# Patient Record
Sex: FEMALE | Race: WHITE | Employment: OTHER | URBAN - NONMETROPOLITAN AREA
[De-identification: names, ages, dates, MRNs, and addresses within clinical notes are randomized per-mention and may not be internally consistent; named-entity substitution may affect disease eponyms.]

---

## 2017-05-08 PROBLEM — H91.90 HEARING LOSS: Status: ACTIVE | Noted: 2017-05-08

## 2017-06-23 ENCOUNTER — HOSPITAL ENCOUNTER (OUTPATIENT)
Age: 62
Discharge: HOME | End: 2017-06-23
Payer: COMMERCIAL

## 2017-06-23 DIAGNOSIS — E78.49 OTHER HYPERLIPIDEMIA: ICD-10-CM

## 2017-06-23 DIAGNOSIS — R73.9 HYPERGLYCEMIA: ICD-10-CM

## 2017-06-23 LAB
ALT SERPL-CCNC: 27 U/L (ref 5–33)
ANION GAP SERPL CALCULATED.3IONS-SCNC: 14 MMOL/L (ref 9–17)
AST SERPL-CCNC: 20 U/L
BUN BLDV-MCNC: 16 MG/DL (ref 8–23)
BUN/CREAT BLD: 24 (ref 9–20)
CALCIUM SERPL-MCNC: 9.2 MG/DL (ref 8.6–10.4)
CHLORIDE BLD-SCNC: 100 MMOL/L (ref 98–107)
CO2: 28 MMOL/L (ref 20–31)
CREAT SERPL-MCNC: 0.67 MG/DL (ref 0.5–0.9)
ESTIMATED AVERAGE GLUCOSE: 111 MG/DL
GFR AFRICAN AMERICAN: >60 ML/MIN
GFR NON-AFRICAN AMERICAN: >60 ML/MIN
GFR SERPL CREATININE-BSD FRML MDRD: ABNORMAL ML/MIN/{1.73_M2}
GFR SERPL CREATININE-BSD FRML MDRD: ABNORMAL ML/MIN/{1.73_M2}
GLUCOSE BLD-MCNC: 102 MG/DL (ref 70–99)
HBA1C MFR BLD: 5.5 % (ref 4.8–5.9)
HCT VFR BLD CALC: 39.2 % (ref 36–46)
HEMOGLOBIN: 13.4 G/DL (ref 12–16)
MCH RBC QN AUTO: 28.7 PG (ref 26–34)
MCHC RBC AUTO-ENTMCNC: 34.2 G/DL (ref 31–37)
MCV RBC AUTO: 84.1 FL (ref 80–100)
PDW BLD-RTO: 13.7 % (ref 12.1–15.2)
PLATELET # BLD: 247 K/UL (ref 140–450)
PMV BLD AUTO: NORMAL FL (ref 6–12)
POTASSIUM SERPL-SCNC: 4.3 MMOL/L (ref 3.7–5.3)
RBC # BLD: 4.67 M/UL (ref 4–5.2)
SODIUM BLD-SCNC: 142 MMOL/L (ref 135–144)
WBC # BLD: 6.8 K/UL (ref 3.5–11)

## 2017-06-23 PROCEDURE — 85027 COMPLETE CBC AUTOMATED: CPT

## 2017-06-23 PROCEDURE — 80048 BASIC METABOLIC PNL TOTAL CA: CPT

## 2017-06-23 PROCEDURE — 83036 HEMOGLOBIN GLYCOSYLATED A1C: CPT

## 2017-06-23 PROCEDURE — 84460 ALANINE AMINO (ALT) (SGPT): CPT

## 2017-06-23 PROCEDURE — 36415 COLL VENOUS BLD VENIPUNCTURE: CPT

## 2017-06-23 PROCEDURE — 84450 TRANSFERASE (AST) (SGOT): CPT

## 2017-08-29 ENCOUNTER — OFFICE VISIT (OUTPATIENT)
Dept: CARDIOLOGY | Age: 62
End: 2017-08-29
Payer: COMMERCIAL

## 2017-08-29 VITALS
HEIGHT: 64 IN | SYSTOLIC BLOOD PRESSURE: 120 MMHG | HEART RATE: 75 BPM | WEIGHT: 191.8 LBS | BODY MASS INDEX: 32.74 KG/M2 | DIASTOLIC BLOOD PRESSURE: 75 MMHG

## 2017-08-29 DIAGNOSIS — E78.5 DYSLIPIDEMIA: ICD-10-CM

## 2017-08-29 DIAGNOSIS — I10 ESSENTIAL HYPERTENSION: ICD-10-CM

## 2017-08-29 DIAGNOSIS — I34.0 NON-RHEUMATIC MITRAL REGURGITATION: Primary | ICD-10-CM

## 2017-08-29 DIAGNOSIS — I35.0 NONRHEUMATIC AORTIC VALVE STENOSIS: ICD-10-CM

## 2017-08-29 PROCEDURE — 93000 ELECTROCARDIOGRAM COMPLETE: CPT | Performed by: INTERNAL MEDICINE

## 2017-08-29 PROCEDURE — 99213 OFFICE O/P EST LOW 20 MIN: CPT | Performed by: INTERNAL MEDICINE

## 2017-12-11 ENCOUNTER — HOSPITAL ENCOUNTER (OUTPATIENT)
Age: 62
Discharge: HOME | End: 2017-12-11
Payer: COMMERCIAL

## 2017-12-11 DIAGNOSIS — R73.9 HYPERGLYCEMIA: ICD-10-CM

## 2017-12-11 DIAGNOSIS — E78.5 HYPERLIPIDEMIA, UNSPECIFIED: ICD-10-CM

## 2017-12-11 LAB
ALT SERPL-CCNC: 23 U/L (ref 5–33)
ANION GAP SERPL CALCULATED.3IONS-SCNC: 10 MMOL/L (ref 9–17)
AST SERPL-CCNC: 19 U/L
BUN BLDV-MCNC: 14 MG/DL (ref 8–23)
BUN/CREAT BLD: 23 (ref 9–20)
CALCIUM SERPL-MCNC: 8.9 MG/DL (ref 8.6–10.4)
CHLORIDE BLD-SCNC: 105 MMOL/L (ref 98–107)
CO2: 30 MMOL/L (ref 20–31)
CREAT SERPL-MCNC: 0.61 MG/DL (ref 0.5–0.9)
ESTIMATED AVERAGE GLUCOSE: 114 MG/DL
GFR AFRICAN AMERICAN: >60 ML/MIN
GFR NON-AFRICAN AMERICAN: >60 ML/MIN
GFR SERPL CREATININE-BSD FRML MDRD: ABNORMAL ML/MIN/{1.73_M2}
GFR SERPL CREATININE-BSD FRML MDRD: ABNORMAL ML/MIN/{1.73_M2}
GLUCOSE BLD-MCNC: 95 MG/DL (ref 70–99)
HBA1C MFR BLD: 5.6 % (ref 4.8–5.9)
HCT VFR BLD CALC: 39.1 % (ref 36–46)
HEMOGLOBIN: 12.7 G/DL (ref 12–16)
HIGH SENSITIVE C-REACTIVE PROTEIN: 4.5 MG/L
MCH RBC QN AUTO: 28.2 PG (ref 26–34)
MCHC RBC AUTO-ENTMCNC: 32.6 G/DL (ref 31–37)
MCV RBC AUTO: 86.5 FL (ref 80–100)
PDW BLD-RTO: 13.8 % (ref 12.1–15.2)
PLATELET # BLD: 238 K/UL (ref 140–450)
PMV BLD AUTO: 7.6 FL (ref 6–12)
POTASSIUM SERPL-SCNC: 4 MMOL/L (ref 3.7–5.3)
RBC # BLD: 4.52 M/UL (ref 4–5.2)
SODIUM BLD-SCNC: 145 MMOL/L (ref 135–144)
WBC # BLD: 6.1 K/UL (ref 3.5–11)

## 2017-12-11 PROCEDURE — 80048 BASIC METABOLIC PNL TOTAL CA: CPT

## 2017-12-11 PROCEDURE — 36415 COLL VENOUS BLD VENIPUNCTURE: CPT

## 2017-12-11 PROCEDURE — 84460 ALANINE AMINO (ALT) (SGPT): CPT

## 2017-12-11 PROCEDURE — 84450 TRANSFERASE (AST) (SGOT): CPT

## 2017-12-11 PROCEDURE — 85027 COMPLETE CBC AUTOMATED: CPT

## 2017-12-11 PROCEDURE — 86141 C-REACTIVE PROTEIN HS: CPT

## 2017-12-11 PROCEDURE — 83036 HEMOGLOBIN GLYCOSYLATED A1C: CPT

## 2017-12-18 ENCOUNTER — OFFICE VISIT (OUTPATIENT)
Dept: FAMILY MEDICINE CLINIC | Age: 62
End: 2017-12-18
Payer: COMMERCIAL

## 2017-12-18 VITALS
DIASTOLIC BLOOD PRESSURE: 72 MMHG | BODY MASS INDEX: 32.44 KG/M2 | HEIGHT: 64 IN | SYSTOLIC BLOOD PRESSURE: 128 MMHG | WEIGHT: 190 LBS

## 2017-12-18 DIAGNOSIS — I10 ESSENTIAL HYPERTENSION: ICD-10-CM

## 2017-12-18 DIAGNOSIS — M85.80 OSTEOPENIA, UNSPECIFIED LOCATION: ICD-10-CM

## 2017-12-18 DIAGNOSIS — H69.80 DYSFUNCTION OF EUSTACHIAN TUBE, UNSPECIFIED LATERALITY: ICD-10-CM

## 2017-12-18 DIAGNOSIS — Z12.31 ENCOUNTER FOR SCREENING MAMMOGRAM FOR BREAST CANCER: ICD-10-CM

## 2017-12-18 DIAGNOSIS — R73.9 HYPERGLYCEMIA: Primary | ICD-10-CM

## 2017-12-18 PROCEDURE — 99214 OFFICE O/P EST MOD 30 MIN: CPT | Performed by: FAMILY MEDICINE

## 2017-12-18 PROCEDURE — G8484 FLU IMMUNIZE NO ADMIN: HCPCS | Performed by: FAMILY MEDICINE

## 2017-12-18 PROCEDURE — G8417 CALC BMI ABV UP PARAM F/U: HCPCS | Performed by: FAMILY MEDICINE

## 2017-12-18 PROCEDURE — 3017F COLORECTAL CA SCREEN DOC REV: CPT | Performed by: FAMILY MEDICINE

## 2017-12-18 PROCEDURE — 92567 TYMPANOMETRY: CPT | Performed by: FAMILY MEDICINE

## 2017-12-18 PROCEDURE — 1036F TOBACCO NON-USER: CPT | Performed by: FAMILY MEDICINE

## 2017-12-18 PROCEDURE — 3014F SCREEN MAMMO DOC REV: CPT | Performed by: FAMILY MEDICINE

## 2017-12-18 PROCEDURE — G8427 DOCREV CUR MEDS BY ELIG CLIN: HCPCS | Performed by: FAMILY MEDICINE

## 2017-12-18 RX ORDER — PREDNISONE 10 MG/1
TABLET ORAL
Qty: 30 TABLET | Refills: 0 | Status: SHIPPED | OUTPATIENT
Start: 2017-12-18 | End: 2018-06-22 | Stop reason: ALTCHOICE

## 2017-12-18 NOTE — PROGRESS NOTES
[address]  [place] [id number]  Dept: [phone]      Elena Pyle is a 60 y.o. female here for 6 Month Follow-Up; Hyperglycemia; and Hypertension      HPI:  HYPERTENSION  She is not exercising and is adherent to a low-salt diet. Blood pressure is not being monitored at home. Cardiac symptoms: chest pressure and palpitations. Patient denies: [place] Medal:  Diet compliance: compliant most of the time  Current exercise: no regular exercise. Prior to Admission medications    Medication Sig Start Date End Date Taking? Authorizing Provider   predniSONE (DELTASONE) 10 MG tablet 2 po BID x 5 days then 1 po BID x 5 days 12/18/17  Yes Shaneka Ceballos MD   furosemide (LASIX) 40 MG tablet TAKE ONE TABLET BY MOUTH DAILY 10/9/17  Yes Shaneka Ceballos MD   atorvastatin (LIPITOR) 20 MG tablet TAKE ONE TABLET BY MOUTH DAILY 9/19/17  Yes Shaneka Ceballos MD   calcium carbonate (OSCAL) 500 MG TABS tablet Take 500 mg by mouth daily   Yes Historical Provider, MD   aspirin 81 MG tablet Take 81 mg by mouth daily   Yes Historical Provider, MD   Multiple Vitamins-Minerals (ALIVE ONCE DAILY WOMENS 50+ PO) Take by mouth   Yes Historical Provider, MD   Ascorbic Acid (VITAMIN C) 250 MG tablet Take 250 mg by mouth daily   Yes Historical Provider, MD   lovastatin (MEVACOR) 20 MG tablet Take 1 tablet by mouth daily 4/5/16 8/23/16  Shaneka Ceballos MD       ROS:  General Constitutional: Denies chills. Denies fever. Denies headache. Denies lightheadedness. Ophthalmologic: Denies blurred vision. ENT: Denies nasal congestion. Denies sore throat. Denies ear pain and pressure. Admits left ear pressure and swishing sound, she has had this problem for awhile and has taken a full course of ATB without improvement  Respiratory: Denies cough. Denies shortness of breath. Denies wheezing. Cardiovascular: Denies chest pain at rest. Denies irregular heartbeat.  Denies no masses or organomegaly. Musculoskeletal: normal, full range of motion in knees and hips, no swelling or tenderness. Extremities: no cyanosis or edema. Peripheral Pulses: 2+ throughout, symetric. Neurologic: nonfocal, motor strength normal upper and lower extremities, sensory exam intact. Psych: normal affect, speech fluent. ASSESSMENT:  1. Hyperglycemia     2. Essential hypertension     3. Osteopenia, unspecified location     4. Dysfunction of Eustachian tube, unspecified laterality  82132 - [place] TYMPANOMETRY   5. Encounter for screening mammogram for breast cancer  [facility] DIGITAL SCREEN W CAD BILATERAL       PLAN:  I reviewed her labs with her and I am pleased with these  We discussed the importance of exercise and how this can actually help with end of day fatigue  I checked a tympanometer today and this was normal bilaterally  She should use Nasonex on Flonase 1-2 sprays at night time and I will give her Prednisone 20 mg BID x 5 days and then 10 mg BID x 5 days  Orders Placed This Encounter   Procedures    [facility] DIGITAL SCREEN W CAD BILATERAL     Standing Status:   Future     Standing Expiration Date:   2019    60833 - [place] TYMPANOMETRY     Orders Placed This Encounter   Medications    predniSONE (DELTASONE) 10 MG tablet     Si po BID x 5 days then 1 po BID x 5 days     Dispense:  30 tablet     Refill:  0       Scribed by: KORY Tijerina

## 2017-12-18 NOTE — PATIENT INSTRUCTIONS
PLAN:  I reviewed her labs with her and I am pleased with these  We discussed the importance of exercise and how this can actually help with end of day fatigue  I checked a tympanometer today and this was normal bilaterally  She should use Nasonex on Flonase 1-2 sprays at night time and I will give her Prednisone 20 mg BID x 5 days and then 10 mg BID x 5 days

## 2018-01-08 RX ORDER — FUROSEMIDE 40 MG/1
TABLET ORAL
Qty: 90 TABLET | Refills: 1 | Status: SHIPPED | OUTPATIENT
Start: 2018-01-08 | End: 2018-07-26 | Stop reason: SDUPTHER

## 2018-01-18 ENCOUNTER — HOSPITAL ENCOUNTER (OUTPATIENT)
Dept: WOMENS IMAGING | Age: 63
Discharge: HOME | End: 2018-01-18
Payer: COMMERCIAL

## 2018-01-18 DIAGNOSIS — Z12.31 ENCOUNTER FOR SCREENING MAMMOGRAM FOR BREAST CANCER: ICD-10-CM

## 2018-01-18 PROCEDURE — 77067 SCR MAMMO BI INCL CAD: CPT

## 2018-06-22 ENCOUNTER — OFFICE VISIT (OUTPATIENT)
Dept: FAMILY MEDICINE CLINIC | Age: 63
End: 2018-06-22
Payer: COMMERCIAL

## 2018-06-22 ENCOUNTER — HOSPITAL ENCOUNTER (OUTPATIENT)
Age: 63
Discharge: HOME | End: 2018-06-22
Payer: COMMERCIAL

## 2018-06-22 ENCOUNTER — TELEPHONE (OUTPATIENT)
Dept: GASTROENTEROLOGY | Age: 63
End: 2018-06-22

## 2018-06-22 VITALS
WEIGHT: 195 LBS | BODY MASS INDEX: 33.29 KG/M2 | DIASTOLIC BLOOD PRESSURE: 78 MMHG | HEIGHT: 64 IN | SYSTOLIC BLOOD PRESSURE: 132 MMHG

## 2018-06-22 DIAGNOSIS — I10 ESSENTIAL HYPERTENSION: Primary | ICD-10-CM

## 2018-06-22 DIAGNOSIS — R31.9 HEMATURIA, UNSPECIFIED TYPE: ICD-10-CM

## 2018-06-22 DIAGNOSIS — E78.5 HYPERLIPIDEMIA, UNSPECIFIED HYPERLIPIDEMIA TYPE: ICD-10-CM

## 2018-06-22 DIAGNOSIS — R10.32 ABDOMINAL PAIN, LLQ: ICD-10-CM

## 2018-06-22 DIAGNOSIS — Z12.11 COLON CANCER SCREENING: Primary | ICD-10-CM

## 2018-06-22 DIAGNOSIS — R10.31 ABDOMINAL PAIN, RLQ: ICD-10-CM

## 2018-06-22 DIAGNOSIS — R73.9 HYPERGLYCEMIA: ICD-10-CM

## 2018-06-22 LAB
ABSOLUTE EOS #: 0.06 K/UL (ref 0–0.44)
ABSOLUTE IMMATURE GRANULOCYTE: <0.03 K/UL (ref 0–0.3)
ABSOLUTE LYMPH #: 2.28 K/UL (ref 1.1–3.7)
ABSOLUTE MONO #: 0.57 K/UL (ref 0.1–1.2)
ALBUMIN SERPL-MCNC: 3.7 G/DL (ref 3.5–5.2)
ALBUMIN/GLOBULIN RATIO: 1.1 (ref 1–2.5)
ALP BLD-CCNC: 101 U/L (ref 35–104)
ALT SERPL-CCNC: 28 U/L (ref 5–33)
AMYLASE: 70 U/L (ref 28–100)
ANION GAP SERPL CALCULATED.3IONS-SCNC: 8 MMOL/L (ref 9–17)
AST SERPL-CCNC: 19 U/L
BACTERIA URINE, POC: ABNORMAL
BASOPHILS # BLD: 0 % (ref 0–2)
BASOPHILS ABSOLUTE: <0.03 K/UL (ref 0–0.2)
BILIRUB SERPL-MCNC: 0.38 MG/DL (ref 0.3–1.2)
BILIRUBIN URINE: 0 MG/DL
BLOOD, URINE: POSITIVE
BUN BLDV-MCNC: 14 MG/DL (ref 8–23)
BUN/CREAT BLD: 23 (ref 9–20)
CALCIUM SERPL-MCNC: 9.2 MG/DL (ref 8.6–10.4)
CASTS URINE, POC: ABNORMAL
CHLORIDE BLD-SCNC: 106 MMOL/L (ref 98–107)
CLARITY: CLEAR
CO2: 31 MMOL/L (ref 20–31)
COLOR: YELLOW
CREAT SERPL-MCNC: 0.62 MG/DL (ref 0.5–0.9)
CRYSTALS URINE, POC: 0
DIFFERENTIAL TYPE: NORMAL
EOSINOPHILS RELATIVE PERCENT: 1 % (ref 1–4)
EPI CELLS URINE, POC: ABNORMAL
GFR AFRICAN AMERICAN: >60 ML/MIN
GFR NON-AFRICAN AMERICAN: >60 ML/MIN
GFR SERPL CREATININE-BSD FRML MDRD: ABNORMAL ML/MIN/{1.73_M2}
GFR SERPL CREATININE-BSD FRML MDRD: ABNORMAL ML/MIN/{1.73_M2}
GLUCOSE BLD-MCNC: 102 MG/DL (ref 70–99)
GLUCOSE URINE: NEGATIVE
HCT VFR BLD CALC: 40.8 % (ref 36.3–47.1)
HEMOGLOBIN: 13.2 G/DL (ref 11.9–15.1)
IMMATURE GRANULOCYTES: 0 %
KETONES, URINE: NEGATIVE
LEUKOCYTE EST, POC: ABNORMAL
LYMPHOCYTES # BLD: 36 % (ref 24–43)
MCH RBC QN AUTO: 28.6 PG (ref 25.2–33.5)
MCHC RBC AUTO-ENTMCNC: 32.4 G/DL (ref 28.4–34.8)
MCV RBC AUTO: 88.3 FL (ref 82.6–102.9)
MONOCYTES # BLD: 9 % (ref 3–12)
NITRITE, URINE: NEGATIVE
NRBC AUTOMATED: 0 PER 100 WBC
PDW BLD-RTO: 12.3 % (ref 11.8–14.4)
PH UA: 5 (ref 4.5–8)
PLATELET # BLD: 229 K/UL (ref 138–453)
PLATELET ESTIMATE: NORMAL
PMV BLD AUTO: 9.6 FL (ref 8.1–13.5)
POTASSIUM SERPL-SCNC: 4.4 MMOL/L (ref 3.7–5.3)
PROTEIN UA: ABNORMAL
RBC # BLD: 4.62 M/UL (ref 3.95–5.11)
RBC # BLD: NORMAL 10*6/UL
RBC URINE, POC: ABNORMAL
SEG NEUTROPHILS: 54 % (ref 36–65)
SEGMENTED NEUTROPHILS ABSOLUTE COUNT: 3.34 K/UL (ref 1.5–8.1)
SODIUM BLD-SCNC: 145 MMOL/L (ref 135–144)
SPECIFIC GRAVITY UA: 1.02 (ref 1–1.03)
TOTAL PROTEIN: 7 G/DL (ref 6.4–8.3)
UROBILINOGEN, URINE: NORMAL
WBC # BLD: 6.3 K/UL (ref 3.5–11.3)
WBC # BLD: NORMAL 10*3/UL
WBC URINE, POC: ABNORMAL
YEAST URINE, POC: 0

## 2018-06-22 PROCEDURE — G8427 DOCREV CUR MEDS BY ELIG CLIN: HCPCS | Performed by: FAMILY MEDICINE

## 2018-06-22 PROCEDURE — 1036F TOBACCO NON-USER: CPT | Performed by: FAMILY MEDICINE

## 2018-06-22 PROCEDURE — 81000 URINALYSIS NONAUTO W/SCOPE: CPT | Performed by: FAMILY MEDICINE

## 2018-06-22 PROCEDURE — 99214 OFFICE O/P EST MOD 30 MIN: CPT | Performed by: FAMILY MEDICINE

## 2018-06-22 PROCEDURE — G8417 CALC BMI ABV UP PARAM F/U: HCPCS | Performed by: FAMILY MEDICINE

## 2018-06-22 PROCEDURE — 80053 COMPREHEN METABOLIC PANEL: CPT

## 2018-06-22 PROCEDURE — 82150 ASSAY OF AMYLASE: CPT

## 2018-06-22 PROCEDURE — 36415 COLL VENOUS BLD VENIPUNCTURE: CPT

## 2018-06-22 PROCEDURE — 3017F COLORECTAL CA SCREEN DOC REV: CPT | Performed by: FAMILY MEDICINE

## 2018-06-22 PROCEDURE — 85025 COMPLETE CBC W/AUTO DIFF WBC: CPT

## 2018-06-22 RX ORDER — SODIUM, POTASSIUM,MAG SULFATES 17.5-3.13G
SOLUTION, RECONSTITUTED, ORAL ORAL
Qty: 2 BOTTLE | Refills: 0 | Status: SHIPPED | OUTPATIENT
Start: 2018-06-22 | End: 2018-07-09 | Stop reason: ALTCHOICE

## 2018-07-06 ENCOUNTER — HOSPITAL ENCOUNTER (OUTPATIENT)
Dept: CT IMAGING | Age: 63
Discharge: HOME | End: 2018-07-08
Payer: COMMERCIAL

## 2018-07-06 DIAGNOSIS — R31.9 HEMATURIA, UNSPECIFIED TYPE: ICD-10-CM

## 2018-07-06 DIAGNOSIS — R10.32 ABDOMINAL PAIN, LLQ: ICD-10-CM

## 2018-07-06 DIAGNOSIS — R10.31 ABDOMINAL PAIN, RLQ: ICD-10-CM

## 2018-07-06 PROCEDURE — 74177 CT ABD & PELVIS W/CONTRAST: CPT

## 2018-07-06 PROCEDURE — 6360000004 HC RX CONTRAST MEDICATION: Performed by: FAMILY MEDICINE

## 2018-07-06 PROCEDURE — 74178 CT ABD&PLV WO CNTR FLWD CNTR: CPT

## 2018-07-06 RX ADMIN — IOPAMIDOL 100 ML: 612 INJECTION, SOLUTION INTRAVENOUS at 08:58

## 2018-07-06 NOTE — PROGRESS NOTES
noitfy there are stones in both kidneys and in gallbladder but this does not seem to be obstructive and therefore not likely the cause of symptoms  The the vaginal cuff where the uterus was removed has a mass there that should be evaluated by gynecologist it likely is the cervix which was not taken out  The ovary on the right has cysts which may be significant as well   This is worth investigating since the operative note from her surgery in 2009 was both ovaries removed  This may need to be followed by serial ultrasounds by DR Nagel or other gyn

## 2018-07-09 ENCOUNTER — OFFICE VISIT (OUTPATIENT)
Dept: FAMILY MEDICINE CLINIC | Age: 63
End: 2018-07-09
Payer: COMMERCIAL

## 2018-07-09 VITALS
SYSTOLIC BLOOD PRESSURE: 132 MMHG | DIASTOLIC BLOOD PRESSURE: 76 MMHG | HEIGHT: 64 IN | WEIGHT: 194 LBS | BODY MASS INDEX: 33.12 KG/M2

## 2018-07-09 DIAGNOSIS — N83.201 RIGHT OVARIAN CYST: ICD-10-CM

## 2018-07-09 DIAGNOSIS — R31.9 HEMATURIA, UNSPECIFIED TYPE: Primary | ICD-10-CM

## 2018-07-09 DIAGNOSIS — N20.0 NEPHROLITHIASIS: ICD-10-CM

## 2018-07-09 PROCEDURE — 99213 OFFICE O/P EST LOW 20 MIN: CPT | Performed by: FAMILY MEDICINE

## 2018-07-09 PROCEDURE — 1036F TOBACCO NON-USER: CPT | Performed by: FAMILY MEDICINE

## 2018-07-09 PROCEDURE — 3017F COLORECTAL CA SCREEN DOC REV: CPT | Performed by: FAMILY MEDICINE

## 2018-07-09 PROCEDURE — G8417 CALC BMI ABV UP PARAM F/U: HCPCS | Performed by: FAMILY MEDICINE

## 2018-07-09 PROCEDURE — G8427 DOCREV CUR MEDS BY ELIG CLIN: HCPCS | Performed by: FAMILY MEDICINE

## 2018-07-09 ASSESSMENT — PATIENT HEALTH QUESTIONNAIRE - PHQ9
SUM OF ALL RESPONSES TO PHQ9 QUESTIONS 1 & 2: 0
SUM OF ALL RESPONSES TO PHQ QUESTIONS 1-9: 0
1. LITTLE INTEREST OR PLEASURE IN DOING THINGS: 0
2. FEELING DOWN, DEPRESSED OR HOPELESS: 0

## 2018-07-09 NOTE — PATIENT INSTRUCTIONS
PLAN:  We discuss a supracervical technique and how this explains the \"mass\" on the vaginal cuff. This was most likely the residual tissue from the cervix. It is unclear how she has cysts on her right ovary when according to the operative note she had both ovaries removed in 2009. This could be remaining ovarian tissue and we can't say for sure whether or not this is significant. This is why I recommend additional ultrasounds to evaluate this further. Serial ultrasounds are recommended to monitor this for any changes. It doesn't sound like there is concern for malignancy with this cyst but I explain that with these scans nothing is 100%. I will refer her to Dr. Buck Manzano. I will try to find the path report from Dr. Harris Fox' procedure. She has stones in both kidneys. However, they are in the kidneys and not in the ureters and typically they are in the ureters when they cause hematuria or pain. I will refer her to Dr. Zohra Tao to evaluate the hematuria.

## 2018-07-09 NOTE — PROGRESS NOTES
I, Eden Carrizales, [facility], am scribing for and in the presence of Dr. Roney Sands. 07/09/18 10:43 am Oh 60  [address]  [place] [phone]  Dept: [phone]    HPI:  Pt. Presents for consult to discuss CT abdomen/pelvis results that was ordered to evaluate hematuria and intermittent nolan-umbilical abdominal cramping. Report noted multiple cysts on her right ovary and a mass lesion on the vaginal cuff. Pt. Had supracervical hysterectomy, bilat oopherectomy with cervical conization on 07/22/2009. Today, she states that the bleeding has stopped but she is still having cramping every day. Accompanied by: , Sukhi Leavitt. Current Outpatient Prescriptions   Medication Sig Dispense Refill    furosemide (LASIX) 40 MG tablet TAKE ONE TABLET BY MOUTH DAILY 90 tablet 1    atorvastatin (LIPITOR) 20 MG tablet TAKE ONE TABLET BY MOUTH DAILY 90 tablet 2    calcium carbonate (OSCAL) 500 MG TABS tablet Take 500 mg by mouth daily      aspirin 81 MG tablet Take 81 mg by mouth daily      Multiple Vitamins-Minerals (ALIVE ONCE DAILY WOMENS 50+ PO) Take by mouth      Ascorbic Acid (VITAMIN C) 250 MG tablet Take 250 mg by mouth daily       No current facility-administered medications for this visit. ROS:  Admits intermittent R and L mid-abdominal cramping. Denies hematuria. EXAM:  /76   Ht 5' 4\" (1.626 m)   Wt 194 lb (88 kg)   BMI 33.30 kg/m²   Wt Readings from Last 3 Encounters:   07/09/18 194 lb (88 kg)   06/22/18 195 lb (88.5 kg)   12/18/17 190 lb (86.2 kg)     BP Readings from Last 3 Encounters:   07/09/18 132/76   06/22/18 132/78   12/18/17 128/72     PHYSICAL EXAM:  General Appearance: in no acute distress, well developed, well nourished. Eyes: pupils equal, round reactive to light and accommodation. Wearing glasses  Oral Cavity: mucosa moist.  Neck/Thyroid: neck supple, full range of motion  Skin: warm and dry.  No suspicious

## 2018-07-13 ENCOUNTER — HOSPITAL ENCOUNTER (OUTPATIENT)
Dept: ULTRASOUND IMAGING | Age: 63
Discharge: HOME | End: 2018-07-15
Payer: COMMERCIAL

## 2018-07-13 DIAGNOSIS — N83.201 RIGHT OVARIAN CYST: ICD-10-CM

## 2018-07-13 PROCEDURE — 76830 TRANSVAGINAL US NON-OB: CPT

## 2018-07-22 PROBLEM — Z12.11 COLON CANCER SCREENING: Status: RESOLVED | Noted: 2018-06-22 | Resolved: 2018-07-22

## 2018-08-06 ENCOUNTER — HOSPITAL ENCOUNTER (OUTPATIENT)
Dept: NON INVASIVE DIAGNOSTICS | Age: 63
Discharge: HOME | End: 2018-08-06
Payer: COMMERCIAL

## 2018-08-06 ENCOUNTER — HOSPITAL ENCOUNTER (OUTPATIENT)
Age: 63
Setting detail: SPECIMEN
Discharge: HOME | End: 2018-08-06
Payer: COMMERCIAL

## 2018-08-06 ENCOUNTER — OFFICE VISIT (OUTPATIENT)
Dept: UROLOGY | Age: 63
End: 2018-08-06
Payer: COMMERCIAL

## 2018-08-06 VITALS — SYSTOLIC BLOOD PRESSURE: 134 MMHG | DIASTOLIC BLOOD PRESSURE: 70 MMHG | WEIGHT: 197 LBS | BODY MASS INDEX: 33.81 KG/M2

## 2018-08-06 DIAGNOSIS — I34.0 NON-RHEUMATIC MITRAL REGURGITATION: ICD-10-CM

## 2018-08-06 DIAGNOSIS — R31.0 GROSS HEMATURIA: Primary | ICD-10-CM

## 2018-08-06 DIAGNOSIS — N20.0 RENAL STONE: ICD-10-CM

## 2018-08-06 DIAGNOSIS — R31.0 GROSS HEMATURIA: ICD-10-CM

## 2018-08-06 LAB
-: ABNORMAL
AMORPHOUS: ABNORMAL
BACTERIA: ABNORMAL
BILIRUBIN URINE: NEGATIVE
CASTS UA: ABNORMAL /LPF
COLOR: YELLOW
COMMENT UA: ABNORMAL
CRYSTALS, UA: ABNORMAL /HPF
EPITHELIAL CELLS UA: ABNORMAL /HPF (ref 0–25)
GLUCOSE URINE: NEGATIVE
KETONES, URINE: NEGATIVE
LEUKOCYTE ESTERASE, URINE: NEGATIVE
LV EF: 55 %
LVEF MODALITY: NORMAL
MUCUS: ABNORMAL
NITRITE, URINE: NEGATIVE
OTHER OBSERVATIONS UA: ABNORMAL
PH UA: 8.5 (ref 5–9)
PROTEIN UA: NEGATIVE
RBC UA: ABNORMAL /HPF (ref 0–2)
RENAL EPITHELIAL, UA: ABNORMAL /HPF
SPECIFIC GRAVITY UA: 1.01 (ref 1.01–1.02)
TRICHOMONAS: ABNORMAL
TURBIDITY: ABNORMAL
URINE HGB: ABNORMAL
UROBILINOGEN, URINE: NORMAL
WBC UA: ABNORMAL /HPF (ref 0–5)
YEAST: ABNORMAL

## 2018-08-06 PROCEDURE — 3017F COLORECTAL CA SCREEN DOC REV: CPT | Performed by: NURSE PRACTITIONER

## 2018-08-06 PROCEDURE — 81001 URINALYSIS AUTO W/SCOPE: CPT

## 2018-08-06 PROCEDURE — 99244 OFF/OP CNSLTJ NEW/EST MOD 40: CPT | Performed by: NURSE PRACTITIONER

## 2018-08-06 PROCEDURE — G8417 CALC BMI ABV UP PARAM F/U: HCPCS | Performed by: NURSE PRACTITIONER

## 2018-08-06 PROCEDURE — 87086 URINE CULTURE/COLONY COUNT: CPT

## 2018-08-06 PROCEDURE — G8427 DOCREV CUR MEDS BY ELIG CLIN: HCPCS | Performed by: NURSE PRACTITIONER

## 2018-08-06 PROCEDURE — 1036F TOBACCO NON-USER: CPT | Performed by: NURSE PRACTITIONER

## 2018-08-06 PROCEDURE — 93306 TTE W/DOPPLER COMPLETE: CPT

## 2018-08-06 NOTE — PROGRESS NOTES
HPI:    Patient is a 60 y.o. female in no acute distress. She is alert and oriented to person, place, and time. New patient referral from Dr. Chris Reilly for gross hematuria. She first noticed the hematuria in the spring and has had multiple episodes since. This has not been associated with lower urinary tract symptoms, but she does experience suprapubic \"cramping\". She was never a smoker. She does work for [employer]. She denies history of kidney stones or frequent urinary tract infection. She did have a hysterectomy in 2009, but does have her ovaries. She did have a CT abdomen and pelvis with contrast performed prior to today's visit. This film was independently reviewed and shows punctate nonobstructing stones in the right, and a 5 mm nonobstructing stone in the left kidney but there is no hydronephrosis. There is no no masses or filling defects in the upper collecting system, ureters, or bladder to suggest malignancy. The radiologist as mentioned a multicystic residual right ovary and a mass lesion of the vaginal cuff. Patient does have a vaginal ultrasound and appointment scheduled with gynecology next week. She denies any current dysuria, gross hematuria, frequency or urgency. Past Medical History:   Diagnosis Date    Allergic rhinitis 1992    DDD (degenerative disc disease), cervical 2013    H/O echocardiogram 8/11/15    EF 65%. Aortic valve sclerosis without stenosis. Mild aortic regurgitation. Myxomatous thickening of the mitral leaflets with Moderate mitral regurgitation. Mils (grade l) diastolic dysfunction.  H/O echocardiogram 09/02/2016    EF >60%. Normal LV wall thickness and cavity size. No definite specific wall motion abnormalities were identified. Left atrium is mildly dilated (29-33) left atrial volume index of 31 ml/m2. Mild aortic stenosis. Myxomatous thickening of the mitral leaflets with moderate eccentric mitral regurg. Mild treicuspid regurg.   Mild diastolic dysfunction is seen.  Hyperglycemia 2013    Hypertension     Mitral regurgitation 2010    MVP (mitral valve prolapse) 2010    Obesity 2010    Pulmonary hypertension ([address] 2013    Sleep apnea 2012    Uterine fibroid 2008    Venous insufficiency 2013     Past Surgical History:   Procedure Laterality Date    DILATION AND CURETTAGE OF UTERUS  2008    Dr. Uyen Mondragon  2009    HYSTERECTOMY  2009    supracervical, BSO with cervical conization, Dr. Sami Roberts Prescriptions as of 8/6/2018   Medication Sig Dispense Refill    furosemide (LASIX) 40 MG tablet TAKE ONE TABLET BY MOUTH DAILY 90 tablet 3    atorvastatin (LIPITOR) 20 MG tablet TAKE ONE TABLET BY MOUTH DAILY 90 tablet 3    calcium carbonate (OSCAL) 500 MG TABS tablet Take 500 mg by mouth daily      aspirin 81 MG tablet Take 81 mg by mouth daily      Multiple Vitamins-Minerals (ALIVE ONCE DAILY WOMENS 50+ PO) Take by mouth      Ascorbic Acid (VITAMIN C) 250 MG tablet Take 250 mg by mouth daily      [DISCONTINUED] lovastatin (MEVACOR) 20 MG tablet Take 1 tablet by mouth daily 90 tablet 3     No facility-administered encounter medications on file as of 8/6/2018. Current Outpatient Prescriptions on File Prior to Visit   Medication Sig Dispense Refill    furosemide (LASIX) 40 MG tablet TAKE ONE TABLET BY MOUTH DAILY 90 tablet 3    atorvastatin (LIPITOR) 20 MG tablet TAKE ONE TABLET BY MOUTH DAILY 90 tablet 3    calcium carbonate (OSCAL) 500 MG TABS tablet Take 500 mg by mouth daily      aspirin 81 MG tablet Take 81 mg by mouth daily      Multiple Vitamins-Minerals (ALIVE ONCE DAILY WOMENS 50+ PO) Take by mouth      Ascorbic Acid (VITAMIN C) 250 MG tablet Take 250 mg by mouth daily      [DISCONTINUED] lovastatin (MEVACOR) 20 MG tablet Take 1 tablet by mouth daily 90 tablet 3     No current facility-administered medications on file prior to visit.       Patient has no known allergies. Family History   Problem Relation Age of Onset    High Blood Pressure Father     Heart Disease Father     Stroke Father     Dementia Mother     Other Mother         thoracic AAA    Cancer Mother         bilateral kidney     History   Smoking Status    Never Smoker   Smokeless Tobacco    Never Used       History   Alcohol Use No       Review of Systems   Constitutional: Negative for appetite change, chills and fever. Eyes: Negative for pain, redness and visual disturbance. Respiratory: Negative for cough, shortness of breath and wheezing. Cardiovascular: Negative for chest pain and leg swelling. Gastrointestinal: Negative for abdominal pain, constipation, nausea and vomiting. Genitourinary: Negative for difficulty urinating, dysuria, flank pain, frequency, hematuria, pelvic pain, urgency, vaginal bleeding and vaginal discharge. Musculoskeletal: Negative for back pain, joint swelling and myalgias. Skin: Negative for color change, rash and wound. Neurological: Negative for dizziness, tremors and numbness. Hematological: Negative for adenopathy. Does not bruise/bleed easily. PHYSICAL EXAM:  Constitutional: Patient resting comfortably, in no acute distress. Neuro: Alert and oriented to person place and time. Cranial nerves grossly intact. Psych: Mood and affect normal.  Skin: Warm, dry  HEENT: normocephalic, atraumatic  Lymphatics: No palpable lymphadenopathy  Lungs: Respiratory effort normal, unlabored  Cardiovascular:  Normal peripheral pulses  Abdomen: Soft, non-tender, non-distended with no organomegaly or palpable masses. : No CVA tenderness. Bladder non-tender and not distended. Pelvic: Deferred    Lab Results   Component Value Date    BUN 14 06/22/2018     Lab Results   Component Value Date    CREATININE 0.62 06/22/2018       ASSESSMENT:   Diagnosis Orders   1. Gross hematuria  Urine culture clean catch    Urinalysis with Microscopic   2.  Renal stone  XR

## 2018-08-07 LAB
CULTURE: NORMAL
Lab: NORMAL
SPECIMEN DESCRIPTION: NORMAL
STATUS: NORMAL

## 2018-08-08 ENCOUNTER — HOSPITAL ENCOUNTER (OUTPATIENT)
Age: 63
Discharge: HOME | End: 2018-08-08
Payer: COMMERCIAL

## 2018-08-08 DIAGNOSIS — N83.201 CYST OF RIGHT OVARY: ICD-10-CM

## 2018-08-08 DIAGNOSIS — N83.201 CYST OF RIGHT OVARY: Primary | ICD-10-CM

## 2018-08-08 LAB — CA 125: 11 U/ML

## 2018-08-08 PROCEDURE — 86304 IMMUNOASSAY TUMOR CA 125: CPT

## 2018-08-08 PROCEDURE — 36415 COLL VENOUS BLD VENIPUNCTURE: CPT

## 2018-08-12 ASSESSMENT — ENCOUNTER SYMPTOMS
EYE REDNESS: 0
BACK PAIN: 0
VOMITING: 0
ABDOMINAL PAIN: 0
CONSTIPATION: 0
EYE PAIN: 0
WHEEZING: 0
COLOR CHANGE: 0
NAUSEA: 0
COUGH: 0
SHORTNESS OF BREATH: 0

## 2018-08-13 ENCOUNTER — ANESTHESIA EVENT (OUTPATIENT)
Dept: OPERATING ROOM | Age: 63
End: 2018-08-13
Payer: COMMERCIAL

## 2018-08-13 ENCOUNTER — HOSPITAL ENCOUNTER (OUTPATIENT)
Age: 63
Setting detail: OUTPATIENT SURGERY
Discharge: HOME | End: 2018-08-13
Attending: INTERNAL MEDICINE | Admitting: INTERNAL MEDICINE
Payer: COMMERCIAL

## 2018-08-13 ENCOUNTER — ANESTHESIA (OUTPATIENT)
Dept: OPERATING ROOM | Age: 63
End: 2018-08-13
Payer: COMMERCIAL

## 2018-08-13 VITALS
DIASTOLIC BLOOD PRESSURE: 50 MMHG | TEMPERATURE: 96.8 F | OXYGEN SATURATION: 100 % | RESPIRATION RATE: 18 BRPM | SYSTOLIC BLOOD PRESSURE: 104 MMHG

## 2018-08-13 VITALS
BODY MASS INDEX: 33.12 KG/M2 | RESPIRATION RATE: 18 BRPM | WEIGHT: 194 LBS | SYSTOLIC BLOOD PRESSURE: 127 MMHG | HEIGHT: 64 IN | OXYGEN SATURATION: 98 % | TEMPERATURE: 97.5 F | DIASTOLIC BLOOD PRESSURE: 68 MMHG | HEART RATE: 62 BPM

## 2018-08-13 PROCEDURE — 3700000000 HC ANESTHESIA ATTENDED CARE: Performed by: INTERNAL MEDICINE

## 2018-08-13 PROCEDURE — 3609010600 HC COLONOSCOPY POLYPECTOMY SNARE/COLD BIOPSY: Performed by: INTERNAL MEDICINE

## 2018-08-13 PROCEDURE — 2500000003 HC RX 250 WO HCPCS: Performed by: NURSE ANESTHETIST, CERTIFIED REGISTERED

## 2018-08-13 PROCEDURE — 88305 TISSUE EXAM BY PATHOLOGIST: CPT

## 2018-08-13 PROCEDURE — 2580000003 HC RX 258: Performed by: INTERNAL MEDICINE

## 2018-08-13 PROCEDURE — 3700000001 HC ADD 15 MINUTES (ANESTHESIA): Performed by: INTERNAL MEDICINE

## 2018-08-13 PROCEDURE — 2709999900 HC NON-CHARGEABLE SUPPLY: Performed by: INTERNAL MEDICINE

## 2018-08-13 PROCEDURE — 6360000002 HC RX W HCPCS: Performed by: NURSE ANESTHETIST, CERTIFIED REGISTERED

## 2018-08-13 PROCEDURE — 2580000003 HC RX 258: Performed by: NURSE ANESTHETIST, CERTIFIED REGISTERED

## 2018-08-13 PROCEDURE — C1773 RET DEV, INSERTABLE: HCPCS | Performed by: INTERNAL MEDICINE

## 2018-08-13 PROCEDURE — 7100000010 HC PHASE II RECOVERY - FIRST 15 MIN: Performed by: INTERNAL MEDICINE

## 2018-08-13 PROCEDURE — 7100000011 HC PHASE II RECOVERY - ADDTL 15 MIN: Performed by: INTERNAL MEDICINE

## 2018-08-13 PROCEDURE — 45385 COLONOSCOPY W/LESION REMOVAL: CPT | Performed by: INTERNAL MEDICINE

## 2018-08-13 RX ORDER — SODIUM CHLORIDE, SODIUM LACTATE, POTASSIUM CHLORIDE, CALCIUM CHLORIDE 600; 310; 30; 20 MG/100ML; MG/100ML; MG/100ML; MG/100ML
INJECTION, SOLUTION INTRAVENOUS CONTINUOUS PRN
Status: DISCONTINUED | OUTPATIENT
Start: 2018-08-13 | End: 2018-08-13 | Stop reason: SDUPTHER

## 2018-08-13 RX ORDER — SODIUM CHLORIDE, SODIUM LACTATE, POTASSIUM CHLORIDE, CALCIUM CHLORIDE 600; 310; 30; 20 MG/100ML; MG/100ML; MG/100ML; MG/100ML
INJECTION, SOLUTION INTRAVENOUS CONTINUOUS
Status: DISCONTINUED | OUTPATIENT
Start: 2018-08-13 | End: 2018-08-13 | Stop reason: HOSPADM

## 2018-08-13 RX ORDER — LIDOCAINE HYDROCHLORIDE 20 MG/ML
INJECTION, SOLUTION INFILTRATION; PERINEURAL PRN
Status: DISCONTINUED | OUTPATIENT
Start: 2018-08-13 | End: 2018-08-13 | Stop reason: SDUPTHER

## 2018-08-13 RX ORDER — PROPOFOL 10 MG/ML
INJECTION, EMULSION INTRAVENOUS PRN
Status: DISCONTINUED | OUTPATIENT
Start: 2018-08-13 | End: 2018-08-13 | Stop reason: SDUPTHER

## 2018-08-13 RX ADMIN — PROPOFOL 30 MG: 10 INJECTION, EMULSION INTRAVENOUS at 08:50

## 2018-08-13 RX ADMIN — PROPOFOL 50 MG: 10 INJECTION, EMULSION INTRAVENOUS at 08:45

## 2018-08-13 RX ADMIN — PROPOFOL 50 MG: 10 INJECTION, EMULSION INTRAVENOUS at 08:40

## 2018-08-13 RX ADMIN — SODIUM CHLORIDE, POTASSIUM CHLORIDE, SODIUM LACTATE AND CALCIUM CHLORIDE: 600; 310; 30; 20 INJECTION, SOLUTION INTRAVENOUS at 08:28

## 2018-08-13 RX ADMIN — PROPOFOL 100 MG: 10 INJECTION, EMULSION INTRAVENOUS at 08:35

## 2018-08-13 RX ADMIN — LIDOCAINE HYDROCHLORIDE 80 MG: 20 INJECTION, SOLUTION INFILTRATION; PERINEURAL at 08:32

## 2018-08-13 RX ADMIN — PROPOFOL 50 MG: 10 INJECTION, EMULSION INTRAVENOUS at 08:32

## 2018-08-13 ASSESSMENT — PAIN SCALES - GENERAL
PAINLEVEL_OUTOF10: 0

## 2018-08-13 ASSESSMENT — PAIN - FUNCTIONAL ASSESSMENT: PAIN_FUNCTIONAL_ASSESSMENT: 0-10

## 2018-08-13 NOTE — ANESTHESIA PRE PROCEDURE
given: Not Answered      Vital Signs (Current): There were no vitals filed for this visit. BP Readings from Last 3 Encounters:   08/06/18 134/70   07/09/18 132/76   06/22/18 132/78       NPO Status: Time of last liquid consumption: 0520                        Time of last solid consumption: 1000                        Date of last liquid consumption: 08/13/18                        Date of last solid food consumption: 08/12/18    BMI:   Wt Readings from Last 3 Encounters:   08/06/18 197 lb (89.4 kg)   07/09/18 194 lb (88 kg)   06/22/18 195 lb (88.5 kg)     There is no height or weight on file to calculate BMI.    CBC:   Lab Results   Component Value Date    WBC 6.3 06/22/2018    RBC 4.62 06/22/2018    RBC 4.52 05/08/2012    HGB 13.2 06/22/2018    HCT 40.8 06/22/2018    MCV 88.3 06/22/2018    RDW 12.3 06/22/2018     06/22/2018     05/08/2012       CMP:   Lab Results   Component Value Date     06/22/2018    K 4.4 06/22/2018     06/22/2018    CO2 31 06/22/2018    BUN 14 06/22/2018    CREATININE 0.62 06/22/2018    GFRAA >60 06/22/2018    LABGLOM >60 06/22/2018    GLUCOSE 102 06/22/2018    GLUCOSE 98 05/08/2012    PROT 7.0 06/22/2018    CALCIUM 9.2 06/22/2018    BILITOT 0.38 06/22/2018    ALKPHOS 101 06/22/2018    AST 19 06/22/2018    ALT 28 06/22/2018       POC Tests: No results for input(s): POCGLU, POCNA, POCK, POCCL, POCBUN, POCHEMO, POCHCT in the last 72 hours.     Coags: No results found for: PROTIME, INR, APTT    HCG (If Applicable): No results found for: PREGTESTUR, PREGSERUM, HCG, HCGQUANT     ABGs: No results found for: PHART, PO2ART, KPW1MTG, IDC1VCN, BEART, U9WHFRME     Type & Screen (If Applicable):  No results found for: [facility]    Anesthesia Evaluation  Patient summary reviewed and Nursing notes reviewed no history of anesthetic complications:   Airway: Mallampati: III  TM distance: <3 FB   Neck ROM: full  Mouth opening: > = 3 FB

## 2018-08-14 ENCOUNTER — TELEPHONE (OUTPATIENT)
Dept: CARDIOLOGY | Age: 63
End: 2018-08-14

## 2018-08-14 ENCOUNTER — OFFICE VISIT (OUTPATIENT)
Dept: OBGYN | Age: 63
End: 2018-08-14
Payer: COMMERCIAL

## 2018-08-14 VITALS
BODY MASS INDEX: 33.97 KG/M2 | WEIGHT: 199 LBS | DIASTOLIC BLOOD PRESSURE: 82 MMHG | HEIGHT: 64 IN | SYSTOLIC BLOOD PRESSURE: 128 MMHG

## 2018-08-14 DIAGNOSIS — R10.2 PELVIC PAIN: ICD-10-CM

## 2018-08-14 DIAGNOSIS — N94.89 ADNEXAL MASS: Primary | ICD-10-CM

## 2018-08-14 LAB — SURGICAL PATHOLOGY REPORT: NORMAL

## 2018-08-14 PROCEDURE — 99203 OFFICE O/P NEW LOW 30 MIN: CPT | Performed by: OBSTETRICS & GYNECOLOGY

## 2018-08-14 PROCEDURE — 76830 TRANSVAGINAL US NON-OB: CPT | Performed by: OBSTETRICS & GYNECOLOGY

## 2018-08-14 PROCEDURE — 76857 US EXAM PELVIC LIMITED: CPT | Performed by: OBSTETRICS & GYNECOLOGY

## 2018-08-14 PROCEDURE — 1036F TOBACCO NON-USER: CPT | Performed by: OBSTETRICS & GYNECOLOGY

## 2018-08-14 PROCEDURE — 3017F COLORECTAL CA SCREEN DOC REV: CPT | Performed by: OBSTETRICS & GYNECOLOGY

## 2018-08-14 PROCEDURE — G8417 CALC BMI ABV UP PARAM F/U: HCPCS | Performed by: OBSTETRICS & GYNECOLOGY

## 2018-08-14 PROCEDURE — G8427 DOCREV CUR MEDS BY ELIG CLIN: HCPCS | Performed by: OBSTETRICS & GYNECOLOGY

## 2018-08-14 NOTE — PROGRESS NOTES
 None     Social History Narrative    None       REVIEW OF SYSTEMS:  Review of Systems   Constitutional: Negative for chills and fever. Gastrointestinal: Negative for abdominal pain, constipation, diarrhea, nausea and vomiting. Genitourinary: Positive for pelvic pain (occ). Negative for vaginal bleeding, vaginal discharge and vaginal pain. PHYSICAL EXAM:  /82 (Site: Left Arm, Position: Sitting, Cuff Size: Large Adult)   Ht 5' 4\" (1.626 m)   Wt 199 lb (90.3 kg)   BMI 34.16 kg/m²   Physical Exam   Constitutional: She is oriented to person, place, and time. She appears well-developed and well-nourished. HENT:   Head: Normocephalic and atraumatic. Eyes: Pupils are equal, round, and reactive to light. EOM are normal.   Neck: Normal range of motion. Cardiovascular: Normal rate. Pulmonary/Chest: Effort normal.   Musculoskeletal: Normal range of motion. Neurological: She is alert and oriented to person, place, and time. Skin: Skin is warm and dry. Psychiatric: She has a normal mood and affect. Her behavior is normal. Judgment and thought content normal.       ASSESSMENT:      1. Adnexal mass    2. Pelvic pain        PLAN:  Orders Placed This Encounter   Procedures    US Pelvis Limited    US Non OB Transvaginal    IR Interventional Radiology Procedure Request     I am sending a note to interventional radiology to see if they can attempt to aspirate the cyst for identification purposes. The patient is s/p BSO so there isn't any female adnexal structures that should be responsible for what is being imaged.       Electronically signed by Jesus Day DO on 08/17/18

## 2018-08-15 DIAGNOSIS — Z12.11 COLON CANCER SCREENING: ICD-10-CM

## 2018-08-17 ENCOUNTER — TELEPHONE (OUTPATIENT)
Dept: OBGYN | Age: 63
End: 2018-08-17

## 2018-08-17 ASSESSMENT — ENCOUNTER SYMPTOMS
NAUSEA: 0
CONSTIPATION: 0
DIARRHEA: 0
VOMITING: 0
ABDOMINAL PAIN: 0

## 2018-08-20 NOTE — TELEPHONE ENCOUNTER
Called radiology and spoke to Dr. Rico Pedro, to discuss the procedure or steps to take to do a cyst aspiration.  Vika Barnes said she would talk to Dr. Shaekel Alexandra to see how/when we could get this done;

## 2018-08-20 NOTE — TELEPHONE ENCOUNTER
Got an answer from Dr. Sourav Katz, and she does not feel comfortble doing this procedure; She recommended a referral to a specialist.    I called Marlene Pradhan to inform her that the procedure couldn't be done here at [facility] and we would refer her out. So who can we refer her too?

## 2018-08-27 ENCOUNTER — PROCEDURE VISIT (OUTPATIENT)
Dept: UROLOGY | Age: 63
End: 2018-08-27
Payer: COMMERCIAL

## 2018-08-27 VITALS
WEIGHT: 198 LBS | BODY MASS INDEX: 32.99 KG/M2 | HEIGHT: 65 IN | SYSTOLIC BLOOD PRESSURE: 132 MMHG | DIASTOLIC BLOOD PRESSURE: 70 MMHG

## 2018-08-27 DIAGNOSIS — N20.0 RENAL STONE: ICD-10-CM

## 2018-08-27 DIAGNOSIS — R31.0 GROSS HEMATURIA: Primary | ICD-10-CM

## 2018-08-27 PROCEDURE — G8427 DOCREV CUR MEDS BY ELIG CLIN: HCPCS | Performed by: UROLOGY

## 2018-08-27 PROCEDURE — 3017F COLORECTAL CA SCREEN DOC REV: CPT | Performed by: UROLOGY

## 2018-08-27 PROCEDURE — 1036F TOBACCO NON-USER: CPT | Performed by: UROLOGY

## 2018-08-27 PROCEDURE — 99214 OFFICE O/P EST MOD 30 MIN: CPT | Performed by: UROLOGY

## 2018-08-27 PROCEDURE — G8417 CALC BMI ABV UP PARAM F/U: HCPCS | Performed by: UROLOGY

## 2018-08-27 PROCEDURE — 52000 CYSTOURETHROSCOPY: CPT | Performed by: UROLOGY

## 2018-08-27 ASSESSMENT — ENCOUNTER SYMPTOMS
WHEEZING: 0
EYE PAIN: 0
ABDOMINAL PAIN: 0
VOMITING: 0
NAUSEA: 0
COLOR CHANGE: 0
COUGH: 0
EYE REDNESS: 0
BACK PAIN: 0
SHORTNESS OF BREATH: 0

## 2018-08-27 NOTE — PROGRESS NOTES
Subjective:      Patient ID: Sonny Rendon is a 60 y.o. female. HPI  Patient is a 60 y.o. female in no acute distress. She is alert and oriented to person, place, and time. History  8/6/2018 Referral from Dr. Marcio Lee for gross hematuria. She first noticed the hematuria in the spring and has had multiple episodes since. This has not been associated with lower urinary tract symptoms, but she does experience suprapubic \"cramping\". She was never a smoker. She does work for [employer]. She denies history of kidney stones or frequent urinary tract infection. She did have a hysterectomy in 2009, but does have her ovaries. She did have a CT abdomen and pelvis with contrast performed prior to today's visit. This film was independently reviewed and shows punctate nonobstructing stones in the right, and a 5 mm nonobstructing stone in the left kidney but there is no hydronephrosis. There is no no masses or filling defects in the upper collecting system, ureters, or bladder to suggest malignancy. The radiologist as mentioned a multicystic residual right ovary and a mass lesion of the vaginal cuff. Patient does have a vaginal ultrasound and appointment scheduled with gynecology next week. Today  Here today for lower tract visualization for gross hematuria. She did have a CT abdomen and pelvis with contrast performed on 7/6/2018. This was independently reviewed and shows punctate nonobstructing stones in the right, and a 5 mm nonobstructing stone in the left kidney but there is no hydronephrosis. There is no no masses or filling defects in the upper collecting system, ureters, or bladder to suggest malignancy. The radiologist as mentioned a multicystic residual right ovary and a mass lesion of the vaginal cuff. She did see gynecology for this and they are completing a work-up for her GYN abnormalities. Patient has had no gross hematuria since last visit.     Cystoscopy Procedure Note    Indications:    Diagnosis Orders   1. Gross hematuria  [place] CYSTOURETHROSCOPY   2. Renal stone         Pre-operative Diagnosis:    Diagnosis Orders   1. Gross hematuria  GA CYSTOURETHROSCOPY   2. Renal stone         Post-operative Diagnosis: Same    Surgeon: Zackery Barfield     Assistants: None    Anesthesia : Local    Procedure Details   The risks, benefits, complications, treatment options, and expected outcomes were discussed with the patient. The patient concurred with the proposed plan, giving informed consent. Cystoscopy was performed today under local anesthesia, using sterile technique. The patient was placed in the dorsal lithotomy position, prepped with CHG, and draped in the usual sterile fashion. A 14 [place]ian flexible cystoscope was used to systematically inspect both the urethra and bladder in their entirety. Findings:  Anterior urethra: normal without strictures  Hyperplasia: na  Bladder: Normal mucosa, without lesions. Ureteral orifice(s) was/were seen in the normal position and effluxing clear urine  Trabeculations No  Diverticulum No  Description: Normal anatomy         Specimens: none                 Complications:  None; patient tolerated the procedure well. Disposition: home           Condition: stable          Past Medical History:   Diagnosis Date    Allergic rhinitis 1992    DDD (degenerative disc disease), cervical 2013    H/O echocardiogram 8/11/15    EF 65%. Aortic valve sclerosis without stenosis. Mild aortic regurgitation. Myxomatous thickening of the mitral leaflets with Moderate mitral regurgitation. Mils (grade l) diastolic dysfunction.  H/O echocardiogram 09/02/2016    EF >60%. Normal LV wall thickness and cavity size. No definite specific wall motion abnormalities were identified. Left atrium is mildly dilated (29-33) left atrial volume index of 31 ml/m2. Mild aortic stenosis.  Myxomatous thickening of the mitral leaflets with moderate eccentric mitral regurg. Mild treicuspid regurg. Mild diastolic dysfunction is seen.  Hyperglycemia 2013    Hyperlipidemia     Hypertension     Mitral regurgitation 2010    MVP (mitral valve prolapse) 2010    Obesity 2010    Pulmonary hypertension ([address] 2013    Sleep apnea 2012    Uterine fibroid 2008    Venous insufficiency 2013    Wears glasses      Past Surgical History:   Procedure Laterality Date    COLONOSCOPY  08/13/2018    -polyp(benign lymphoid)hemorrhoids    COLONOSCOPY N/A 8/13/2018    COLONOSCOPY POLYPECTOMY SNARE/COLD BIOPSY performed by Nathan Pastor MD at [address] [facility]  2008    Dr. Katheryn Fallon  2009    HYSTERECTOMY  2009    supracervical, BSO with cervical conization, Dr. Turpin Hind      Dr. Everardo Amador     Family History   Problem Relation Age of Onset    High Blood Pressure Father     Heart Disease Father     Stroke Father     Dementia Mother     Other Mother         thoracic AAA    Cancer Mother         bilateral kidney     Current Outpatient Prescriptions on File Prior to Visit   Medication Sig Dispense Refill    furosemide (LASIX) 40 MG tablet TAKE ONE TABLET BY MOUTH DAILY 90 tablet 3    atorvastatin (LIPITOR) 20 MG tablet TAKE ONE TABLET BY MOUTH DAILY 90 tablet 3    calcium carbonate (OSCAL) 500 MG TABS tablet Take 500 mg by mouth daily      aspirin 81 MG tablet Take 81 mg by mouth daily      Multiple Vitamins-Minerals (ALIVE ONCE DAILY WOMENS 50+ PO) Take by mouth      Ascorbic Acid (VITAMIN C) 250 MG tablet Take 250 mg by mouth daily      [DISCONTINUED] lovastatin (MEVACOR) 20 MG tablet Take 1 tablet by mouth daily 90 tablet 3     No current facility-administered medications on file prior to visit.        Outpatient Encounter Prescriptions as of 8/27/2018   Medication Sig Dispense Refill    furosemide (LASIX) 40 MG tablet TAKE ONE TABLET BY MOUTH DAILY 90 tablet 3    atorvastatin (LIPITOR) 20 MG tablet TAKE ONE TABLET BY MOUTH DAILY 90 tablet 3    calcium carbonate (OSCAL) 500 MG TABS tablet Take 500 mg by mouth daily      aspirin 81 MG tablet Take 81 mg by mouth daily      Multiple Vitamins-Minerals (ALIVE ONCE DAILY WOMENS 50+ PO) Take by mouth      Ascorbic Acid (VITAMIN C) 250 MG tablet Take 250 mg by mouth daily      [DISCONTINUED] lovastatin (MEVACOR) 20 MG tablet Take 1 tablet by mouth daily 90 tablet 3     No facility-administered encounter medications on file as of 8/27/2018. Patient has no known allergies. History   Smoking Status    Never Smoker   Smokeless Tobacco    Never Used     History   Alcohol Use No       Vitals:    08/27/18 1601   BP: 132/70     PHYSICAL EXAM:  Constitutional: Patient resting comfortably, in no acute distress. Neuro: Alert and oriented to person place and time. Cranial nerves grossly intact. Psych: Mood and affect normal.  Skin: Warm, dry  HEENT: normocephalic, atraumatic  Lymphatics: No palpable lymphadenopathy  Lungs: Respiratory effort normal, unlabored  Cardiovascular:  Normal peripheral pulses  Abdomen: Soft, non-tender, non-distended with no organomegaly or palpable masses. : No CVA tenderness. Bladder non-tender and not distended. Pelvic: Vaginal atrophy      No results found for this visit on 08/27/18. Lab Results   Component Value Date    BUN 14 06/22/2018     Lab Results   Component Value Date    CREATININE 0.62 06/22/2018     Review of Systems   Constitutional: Negative for appetite change, chills and fever. Eyes: Negative for pain, redness and visual disturbance. Respiratory: Negative for cough, shortness of breath and wheezing. Cardiovascular: Negative for chest pain and leg swelling. Gastrointestinal: Negative for abdominal pain, nausea and vomiting. Genitourinary: Negative for difficulty urinating, dysuria, flank pain, frequency, hematuria, pelvic pain, vaginal bleeding and vaginal discharge.    Musculoskeletal: Negative for back pain, joint swelling and myalgias. Skin: Negative for color change, rash and wound. Neurological: Negative for dizziness, tremors and numbness. Hematological: Negative for adenopathy. Does not bruise/bleed easily. Objective:   Physical Exam    Assessment: This is a 60 y.o. female with the following diagnoses:   Diagnosis Orders   1. Gross hematuria  [place] CYSTOURETHROSCOPY   2. Renal stone           Plan:      Patient is clear from a gross hematuria standpoint. Patient would like to proceed with definitive stone therapy on her left side. We did review films today and stone is visible on  film on the left. We will schedule her for left ESWL. Patient was made aware of the risks and benefits of the procedure.         Jessica Espino MD

## 2018-08-28 ENCOUNTER — TELEPHONE (OUTPATIENT)
Dept: FAMILY MEDICINE CLINIC | Age: 63
End: 2018-08-28

## 2018-08-28 DIAGNOSIS — N94.89 ADNEXAL MASS: Primary | ICD-10-CM

## 2018-08-28 DIAGNOSIS — R10.2 PELVIC PAIN: ICD-10-CM

## 2018-09-09 PROBLEM — Z12.11 COLON CANCER SCREENING: Status: RESOLVED | Noted: 2018-06-22 | Resolved: 2018-09-09

## 2018-09-11 ENCOUNTER — TELEPHONE (OUTPATIENT)
Dept: UROLOGY | Age: 63
End: 2018-09-11

## 2018-09-11 ENCOUNTER — OFFICE VISIT (OUTPATIENT)
Dept: CARDIOLOGY | Age: 63
End: 2018-09-11
Payer: COMMERCIAL

## 2018-09-11 VITALS
HEART RATE: 70 BPM | OXYGEN SATURATION: 99 % | SYSTOLIC BLOOD PRESSURE: 114 MMHG | HEIGHT: 64 IN | DIASTOLIC BLOOD PRESSURE: 60 MMHG | RESPIRATION RATE: 20 BRPM | BODY MASS INDEX: 33.29 KG/M2 | WEIGHT: 195 LBS

## 2018-09-11 DIAGNOSIS — R06.02 SOB (SHORTNESS OF BREATH): ICD-10-CM

## 2018-09-11 DIAGNOSIS — I34.1 MVP (MITRAL VALVE PROLAPSE): ICD-10-CM

## 2018-09-11 DIAGNOSIS — I34.0 NON-RHEUMATIC MITRAL REGURGITATION: Primary | ICD-10-CM

## 2018-09-11 DIAGNOSIS — Z01.818 PRE-OP EXAM: ICD-10-CM

## 2018-09-11 DIAGNOSIS — E66.9 CLASS 1 OBESITY WITH BODY MASS INDEX (BMI) OF 33.0 TO 33.9 IN ADULT, UNSPECIFIED OBESITY TYPE, UNSPECIFIED WHETHER SERIOUS COMORBIDITY PRESENT: ICD-10-CM

## 2018-09-11 DIAGNOSIS — I10 ESSENTIAL HYPERTENSION: ICD-10-CM

## 2018-09-11 PROCEDURE — G8417 CALC BMI ABV UP PARAM F/U: HCPCS | Performed by: INTERNAL MEDICINE

## 2018-09-11 PROCEDURE — 93000 ELECTROCARDIOGRAM COMPLETE: CPT | Performed by: INTERNAL MEDICINE

## 2018-09-11 PROCEDURE — G8427 DOCREV CUR MEDS BY ELIG CLIN: HCPCS | Performed by: INTERNAL MEDICINE

## 2018-09-11 PROCEDURE — 99214 OFFICE O/P EST MOD 30 MIN: CPT | Performed by: INTERNAL MEDICINE

## 2018-09-11 PROCEDURE — 3017F COLORECTAL CA SCREEN DOC REV: CPT | Performed by: INTERNAL MEDICINE

## 2018-09-11 PROCEDURE — 1036F TOBACCO NON-USER: CPT | Performed by: INTERNAL MEDICINE

## 2018-09-11 NOTE — TELEPHONE ENCOUNTER
Patient stopped in office today. She stated that she is having surgery with Dr. Diana Owens on 10/12. She is asking to have ESWL changed from 11/6 to 12/4.

## 2018-09-11 NOTE — PROGRESS NOTES
Benny Bernheim am scribing for and in the presence of Lucas Stone MD.    Subjective:     CHIEF COMPLAINT / HPI:    Chief Complaint   Patient presents with    1 Year Follow Up     EKG done today. HX: Non-Rheumatic mitral regurg and Aortic valve stenosis, HTN, Dyslipidemia. Echo done on 8/6. Pt states she is doing ok. C/o: SOB with exertion. Can feel a pressure feeling in her chest. Can also feel palpitaitons from time to time. Deneis: Lighteaded/dizziness. Dear Dr. Tanya Murray MD    I had the pleasure of seeing Cici Metcalf for follow up today. Julio Rodriguez is 60 y.o. female with past medical history of hyperlipidemia, hypertension and mitral regurgitation. No history of coronary artery disease, myocardial infarction, heart failure or significant arrhythmia. Negative stress Echo in 2015    Her risk factors for coronary artery disease includes hypertension, hyperlipidemia and family history of premature coronary artery disease. She is feeling ok overall. Occasional chest pressure, mainly with stress. Infrequent and hasn't changed since last year. No worsening shortness of breath, orthopnea or PND. Occasional palpitations at times but no lightheaded or dizziness. No history of consciousness. She is also going to get a cyst removed in her uterus soon so she is also here for cardiac clearance. Reports sleeping ok at night. ECG done today in office 9/11/2018- Normal sinus rhythm, normal ECG, 68 bpm.     Echo done on 8/6/2018- EF >55%. The LV wall thickness is mildly incrased. Moderate mitral regurg. Mild aortic stenosis. Mild aortic regurg. Mild tricuspid regurg. Evidence of mild (grade I) diastolic dysfucntion is seen. Past Medical History:    Past Medical History:   Diagnosis Date    Allergic rhinitis 1992    DDD (degenerative disc disease), cervical 2013    H/O echocardiogram 8/11/15    EF 65%. Aortic valve sclerosis without stenosis. Mild aortic regurgitation. Results   Component Value Date    ALT 28 06/22/2018    AST 19 06/22/2018    ALKPHOS 101 06/22/2018    BILITOT 0.38 06/22/2018     Lab Results   Component Value Date    CREATININE 0.62 06/22/2018    BUN 14 06/22/2018     (H) 06/22/2018    K 4.4 06/22/2018     06/22/2018    CO2 31 06/22/2018     Lab Results   Component Value Date    TSH 2.20 07/15/2015    G0AFCFP 8.9 07/15/2015     Lab Results   Component Value Date    WBC 6.3 06/22/2018    HGB 13.2 06/22/2018    HCT 40.8 06/22/2018    MCV 88.3 06/22/2018     06/22/2018       Lab Results   Component Value Date    TRIG 93 08/16/2016    TRIG 62 11/25/2015    TRIG 92 06/14/2013     Lab Results   Component Value Date    HDL 52 08/16/2016    HDL 57 11/25/2015    HDL 48 (L) 06/14/2013     Lab Results   Component Value Date    LDLCHOLESTEROL 85 08/16/2016    LDLCHOLESTEROL 87 11/25/2015    LDLCHOLESTEROL 98 06/14/2013         Assessment:      Diagnosis Orders   1. Non-rheumatic mitral regurgitation     2. MVP (mitral valve prolapse)     3. Essential hypertension     4. Pre-op exam     5. SOB (shortness of breath)     6. Class 1 obesity with body mass index (BMI) of 33.0 to 33.9 in adult, unspecified obesity type, unspecified whether serious comorbidity present       Plan:     Non-Rheumatic Mitral Regurgitation and Non-Rheumatic Aortic Valve Stenosis:   · Diuretics: Continue furosemide (lasix) 40 mg once daily. I also discussed the potential side effects of this medication including lightheadedness and dizziness and told her to call the office if this occurs. Antiplatelet Agent: Continue aspirin 81 mg daily. I also reminded her to watch for signs of blood in her stool or black tarry stools and stop the medication immediately if this develops as this could be life threatening. No evidence of volume overload or heart failure. Continue conservative management, will consider follow-up echo after 1 year.     Essential Hypertension: Controlled  Diuretics: Continue furosemide (lasix) 40 mg    Statin Therapy: Continue atorvastatin (Lipitor) 20 mg nightly. Pre-Op Clearance: low risk of perioperative cardiac complications, Ms. Connor Foster is going to have an assist removed from her uterus. Based on my evaluation of Ms. Connor Foster, I do not believe that any further testing or intervention would be likely to decrease this risk and therefore recommend that you proceed with surgery as clinically indicated. · Medical management to reduce perioperative risk:  · Antiplatelet Agent: OK to hold 5-7 days aspirin prior to the procedure. However, if this would lead to an unacceptable bleeding risk, I would suggest that this be restarted as soon as safely possible. Additional Recommendations: I would also suggest that he continue her statin throughout the perioperative period. Shortness of Breath:   · I also discussed with Ms. Connor Foster  about what I expect is at least mild to moderate deconditioning. I explained that if she is only doing the minimum necessary to accomplish her daily activities of living, it will be normal to expect shortness of breath whenever she does more activity. Therefore although ideally I would suggest that she exercise for 30-40 minutes 5 days a week, I think that if he would just exercise 3 days a week for 30-40 minutes this would not only help maintain her current quality of life but would give her some reserve if and more likely when she develops some unexpected illness. Obesity Class I:   · I also spent about 5-10 min discussing practical ways to reduce her total calorie intake through better food decision as well as increase calorie burning through exercise. FOLLOW UP:   I told Ms. Connor Foster to call my office if she had any problems, but otherwise told her to Return in about 1 year (around 9/11/2019) for Follow up. However, I would be happy to see her sooner should the need arise.     I believe that the risk of significant morbidity and

## 2018-09-19 ENCOUNTER — HOSPITAL ENCOUNTER (OUTPATIENT)
Dept: MRI IMAGING | Age: 63
Discharge: HOME | End: 2018-09-21
Payer: COMMERCIAL

## 2018-09-19 ENCOUNTER — HOSPITAL ENCOUNTER (OUTPATIENT)
Age: 63
Discharge: HOME | End: 2018-09-19
Payer: COMMERCIAL

## 2018-09-19 DIAGNOSIS — N94.89 ADNEXAL MASS: ICD-10-CM

## 2018-09-19 LAB
CREAT SERPL-MCNC: 0.72 MG/DL (ref 0.5–0.9)
GFR AFRICAN AMERICAN: >60 ML/MIN
GFR NON-AFRICAN AMERICAN: >60 ML/MIN
GFR SERPL CREATININE-BSD FRML MDRD: NORMAL ML/MIN/{1.73_M2}
GFR SERPL CREATININE-BSD FRML MDRD: NORMAL ML/MIN/{1.73_M2}

## 2018-09-19 PROCEDURE — 72197 MRI PELVIS W/O & W/DYE: CPT

## 2018-09-19 PROCEDURE — A9579 GAD-BASE MR CONTRAST NOS,1ML: HCPCS | Performed by: OBSTETRICS & GYNECOLOGY

## 2018-09-19 PROCEDURE — 6360000004 HC RX CONTRAST MEDICATION: Performed by: OBSTETRICS & GYNECOLOGY

## 2018-09-19 PROCEDURE — 82565 ASSAY OF CREATININE: CPT

## 2018-09-19 PROCEDURE — 36415 COLL VENOUS BLD VENIPUNCTURE: CPT

## 2018-09-19 RX ADMIN — GADOTERIDOL 17 ML: 279.3 INJECTION, SOLUTION INTRAVENOUS at 12:41

## 2018-10-05 ENCOUNTER — HOSPITAL ENCOUNTER (OUTPATIENT)
Age: 63
Setting detail: SPECIMEN
Discharge: HOME | End: 2018-10-05
Payer: COMMERCIAL

## 2018-10-05 ENCOUNTER — OFFICE VISIT (OUTPATIENT)
Dept: FAMILY MEDICINE CLINIC | Age: 63
End: 2018-10-05
Payer: COMMERCIAL

## 2018-10-05 VITALS
DIASTOLIC BLOOD PRESSURE: 64 MMHG | SYSTOLIC BLOOD PRESSURE: 132 MMHG | WEIGHT: 195 LBS | HEIGHT: 64 IN | BODY MASS INDEX: 33.29 KG/M2

## 2018-10-05 DIAGNOSIS — N61.1 FURUNCLE OF BREAST: Primary | ICD-10-CM

## 2018-10-05 PROCEDURE — 87070 CULTURE OTHR SPECIMN AEROBIC: CPT

## 2018-10-05 PROCEDURE — G8417 CALC BMI ABV UP PARAM F/U: HCPCS | Performed by: FAMILY MEDICINE

## 2018-10-05 PROCEDURE — 10060 I&D ABSCESS SIMPLE/SINGLE: CPT | Performed by: FAMILY MEDICINE

## 2018-10-05 PROCEDURE — G8484 FLU IMMUNIZE NO ADMIN: HCPCS | Performed by: FAMILY MEDICINE

## 2018-10-05 PROCEDURE — G8427 DOCREV CUR MEDS BY ELIG CLIN: HCPCS | Performed by: FAMILY MEDICINE

## 2018-10-05 PROCEDURE — 3017F COLORECTAL CA SCREEN DOC REV: CPT | Performed by: FAMILY MEDICINE

## 2018-10-05 PROCEDURE — 1036F TOBACCO NON-USER: CPT | Performed by: FAMILY MEDICINE

## 2018-10-05 PROCEDURE — 87205 SMEAR GRAM STAIN: CPT

## 2018-10-05 PROCEDURE — 99212 OFFICE O/P EST SF 10 MIN: CPT | Performed by: FAMILY MEDICINE

## 2018-10-05 RX ORDER — CEPHALEXIN 500 MG/1
500 CAPSULE ORAL 3 TIMES DAILY
Qty: 21 CAPSULE | Refills: 0 | Status: SHIPPED | OUTPATIENT
Start: 2018-10-05 | End: 2018-10-09 | Stop reason: ALTCHOICE

## 2018-10-07 LAB
CULTURE: ABNORMAL
DIRECT EXAM: ABNORMAL
Lab: ABNORMAL
SPECIMEN DESCRIPTION: ABNORMAL
STATUS: ABNORMAL

## 2018-10-09 ENCOUNTER — OFFICE VISIT (OUTPATIENT)
Dept: FAMILY MEDICINE CLINIC | Age: 63
End: 2018-10-09
Payer: COMMERCIAL

## 2018-10-09 VITALS
BODY MASS INDEX: 32.69 KG/M2 | HEIGHT: 65 IN | WEIGHT: 196.2 LBS | DIASTOLIC BLOOD PRESSURE: 72 MMHG | SYSTOLIC BLOOD PRESSURE: 120 MMHG

## 2018-10-09 DIAGNOSIS — N60.82 SEBACEOUS CYST OF BREAST, LEFT: ICD-10-CM

## 2018-10-09 DIAGNOSIS — Z01.818 PRE-OPERATIVE CLEARANCE: Primary | ICD-10-CM

## 2018-10-09 DIAGNOSIS — R19.00 PELVIC MASS: ICD-10-CM

## 2018-10-09 PROCEDURE — G8417 CALC BMI ABV UP PARAM F/U: HCPCS | Performed by: FAMILY MEDICINE

## 2018-10-09 PROCEDURE — G8484 FLU IMMUNIZE NO ADMIN: HCPCS | Performed by: FAMILY MEDICINE

## 2018-10-09 PROCEDURE — 99242 OFF/OP CONSLTJ NEW/EST SF 20: CPT | Performed by: FAMILY MEDICINE

## 2018-10-09 PROCEDURE — G8427 DOCREV CUR MEDS BY ELIG CLIN: HCPCS | Performed by: FAMILY MEDICINE

## 2018-10-09 PROCEDURE — 3017F COLORECTAL CA SCREEN DOC REV: CPT | Performed by: FAMILY MEDICINE

## 2018-10-09 ASSESSMENT — PATIENT HEALTH QUESTIONNAIRE - PHQ9
SUM OF ALL RESPONSES TO PHQ9 QUESTIONS 1 & 2: 0
SUM OF ALL RESPONSES TO PHQ QUESTIONS 1-9: 0
1. LITTLE INTEREST OR PLEASURE IN DOING THINGS: 0
SUM OF ALL RESPONSES TO PHQ QUESTIONS 1-9: 0
2. FEELING DOWN, DEPRESSED OR HOPELESS: 0

## 2018-10-09 NOTE — PROGRESS NOTES
Denies vomiting. Genitourinary: Denies blood in the urine. Denies difficulty urinating. Denies frequent urination. Denies painful urination. Denies urinary incontinence  Musculoskeletal: Denies muscle aches. Denies painful joints. Denies swollen joints. Peripheral Vascular: Denies pain/cramping in legs after exertion. Skin: Denies dry skin. Denies itching. Denies rash. Neurologic: Denies falls. Denies dizziness. Denies fainting. Denies tingling/numbness. Psychiatric: Denies sleep disturbance. Denies anxiety. Denies depressed mood. EXAM:  Vitals:    10/09/18 0929   BP: 120/72   Weight: 196 lb 3.2 oz (89 kg)   Height: 5' 5\" (1.651 m)       /72   Ht 5' 5\" (1.651 m)   Wt 196 lb 3.2 oz (89 kg)   LMP  (LMP Unknown)   BMI 32.65 kg/m²   Wt Readings from Last 3 Encounters:   10/09/18 196 lb 3.2 oz (89 kg)   10/05/18 195 lb (88.5 kg)   09/11/18 195 lb (88.5 kg)     BP Readings from Last 3 Encounters:   10/09/18 120/72   10/05/18 132/64   09/11/18 114/60     PHYSICAL EXAM:  General Appearance: in no acute distress, well developed, well nourished. Eyes: pupils equal, round reactive to light and accommodation. Wearing glasses  Ears: normal canal and TM's. Nose: nares patent, no lesions. Oral Cavity: mucosa moist.  Throat: clear. Neck/Thyroid: neck supple, full range of motion, no cervical lymphadenopathy, no thyromegaly or carotid bruits. Skin: warm and dry. No suspicious lesions. Heart: regular rate and rhythm. S1, S2 normal, no gallops. Rate: 80 2/6 aortic outflow tract murmor, no mitral regurgitation murmor   Lungs: clear to auscultation bilaterally. Abdomen: bowel sounds present, soft, nontender, nondistended, no masses or organomegaly. Obese. Musculoskeletal: normal, full range of motion in knees and hips, no swelling or tenderness. Extremities: no cyanosis trace-1+ edema, peripherally. Peripheral Pulses: 2+ throughout, symetric.   Neurologic: nonfocal, motor strength normal upper and lower

## 2018-10-29 ENCOUNTER — OFFICE VISIT (OUTPATIENT)
Dept: FAMILY MEDICINE CLINIC | Age: 63
End: 2018-10-29
Payer: COMMERCIAL

## 2018-10-29 VITALS
WEIGHT: 193 LBS | BODY MASS INDEX: 32.15 KG/M2 | SYSTOLIC BLOOD PRESSURE: 126 MMHG | HEIGHT: 65 IN | DIASTOLIC BLOOD PRESSURE: 68 MMHG

## 2018-10-29 DIAGNOSIS — Z23 NEED FOR PROPHYLACTIC VACCINATION AND INOCULATION AGAINST INFLUENZA: ICD-10-CM

## 2018-10-29 DIAGNOSIS — N20.0 KIDNEY STONE: Primary | ICD-10-CM

## 2018-10-29 DIAGNOSIS — Z09 POSTOP CHECK: ICD-10-CM

## 2018-10-29 PROCEDURE — 1036F TOBACCO NON-USER: CPT | Performed by: FAMILY MEDICINE

## 2018-10-29 PROCEDURE — G8482 FLU IMMUNIZE ORDER/ADMIN: HCPCS | Performed by: FAMILY MEDICINE

## 2018-10-29 PROCEDURE — 99214 OFFICE O/P EST MOD 30 MIN: CPT | Performed by: FAMILY MEDICINE

## 2018-10-29 PROCEDURE — G8427 DOCREV CUR MEDS BY ELIG CLIN: HCPCS | Performed by: FAMILY MEDICINE

## 2018-10-29 PROCEDURE — G8417 CALC BMI ABV UP PARAM F/U: HCPCS | Performed by: FAMILY MEDICINE

## 2018-10-29 PROCEDURE — 90688 IIV4 VACCINE SPLT 0.5 ML IM: CPT | Performed by: FAMILY MEDICINE

## 2018-10-29 PROCEDURE — 90471 IMMUNIZATION ADMIN: CPT | Performed by: FAMILY MEDICINE

## 2018-10-29 PROCEDURE — 1111F DSCHRG MED/CURRENT MED MERGE: CPT | Performed by: FAMILY MEDICINE

## 2018-10-29 PROCEDURE — 3017F COLORECTAL CA SCREEN DOC REV: CPT | Performed by: FAMILY MEDICINE

## 2018-10-29 RX ORDER — ASPIRIN 325 MG
81 TABLET ORAL DAILY
Status: ON HOLD | COMMUNITY
End: 2018-12-04 | Stop reason: ALTCHOICE

## 2018-10-29 ASSESSMENT — PATIENT HEALTH QUESTIONNAIRE - PHQ9
SUM OF ALL RESPONSES TO PHQ9 QUESTIONS 1 & 2: 0
1. LITTLE INTEREST OR PLEASURE IN DOING THINGS: 0
SUM OF ALL RESPONSES TO PHQ QUESTIONS 1-9: 0
SUM OF ALL RESPONSES TO PHQ QUESTIONS 1-9: 0
2. FEELING DOWN, DEPRESSED OR HOPELESS: 0

## 2018-10-29 NOTE — PROGRESS NOTES
lithotripsy on. Denies difficulty urinating. Denies frequent urination. Denies painful urination. Denies urinary incontinence  Musculoskeletal: Denies muscle aches. Denies painful joints. Denies swollen joints. Peripheral Vascular: Denies pain/cramping in legs after exertion. Skin: Denies dry skin. Denies itching. Denies rash. Neurologic: Denies falls. Denies dizziness. Denies fainting. Denies tingling/numbness. Psychiatric: Denies sleep disturbance. Denies anxiety. Denies depressed mood. EXAM:  /68   Ht 5' 5\" (1.651 m)   Wt 193 lb (87.5 kg)   LMP  (LMP Unknown)   BMI 32.12 kg/m²   Wt Readings from Last 3 Encounters:   10/29/18 193 lb (87.5 kg)   10/09/18 196 lb 3.2 oz (89 kg)   10/05/18 195 lb (88.5 kg)     BP Readings from Last 3 Encounters:   10/29/18 126/68   10/09/18 120/72   10/05/18 132/64     PHYSICAL EXAM:  General Appearance: in no acute distress, well developed, well nourished. Eyes: pupils equal, round reactive to light and accommodation. Wearing glasses  Oral Cavity: mucosa moist.  Neck/Thyroid: neck supple, full range of motion, no cervical lymphadenopathy, no thyromegaly or carotid bruits. Skin: warm and dry. No suspicious lesions. Well healed mid-line incision with no signs of infection  Heart: regular rate and rhythm. No murmurs. S1, S2 normal, no gallops  Lungs: clear to auscultation bilaterally. Abdomen: bowel sounds active soft, nontender, nondistended, no masses or organomegaly. Obese. Musculoskeletal: normal, full range of motion in knees and hips, no swelling or tenderness. Psych: normal affect, speech fluent. ASSESSMENT:   Diagnosis Orders   1. Kidney stone  HI DISCHARGE MEDS RECONCILED W/ CURRENT OUTPATIENT MED LIST    left, with hx of hematuria   2. Postop check  HI DISCHARGE MEDS RECONCILED W/ CURRENT OUTPATIENT MED LIST   3.  Need for prophylactic vaccination and inoculation against influenza  INFLUENZA, QUADV, 3 YRS AND OLDER, IM, MDV, 0.5ML (Ira Stauffer)

## 2018-10-29 NOTE — PATIENT INSTRUCTIONS
PLAN:  The first path report came back a tubal cyst and the other one came back as omentum. I suspect that there was segments of fallopian tube remaining following her hysterectomy. Surgery went well. She had minimal blood loss. She has a 5 mm stone in the left kidney that she plans to have lithotripsy done some time in December. She can discontinue the 81 mg ASA due to changes in guidelines for primary prevention of vascular events. I will see her back in December for her regular check up. Patient Education      SURVEY:    You may be receiving a survey from [employer] regarding your visit today. Please complete the survey to enable us to provide the highest quality of care to you and your family. If you cannot score us a very good on any question, please call the office to discuss how we could have made your experience a very good one. Thank you. Influenza (Flu) Vaccine (Inactivated or Recombinant): What You Need to Know  Why get vaccinated? Influenza (\"flu\") is a contagious disease that spreads around the [place] every winter, usually between October and May. Flu is caused by influenza viruses and is spread mainly by coughing, sneezing, and close contact. Anyone can get flu. Flu strikes suddenly and can last several days. Symptoms vary by age, but can include:  · Fever/chills. · Sore throat. · Muscle aches. · Fatigue. · Cough. · Headache. · Runny or stuffy nose. Flu can also lead to pneumonia and blood infections, and cause diarrhea and seizures in children. If you have a medical condition, such as heart or lung disease, flu can make it worse. Flu is more dangerous for some people. Infants and young children, people 72years of age and older, pregnant women, and people with certain health conditions or a weakened immune system are at greatest risk. Each year thousands of people in the [facility] die from flu, and many more are hospitalized.   Flu vaccine

## 2018-11-08 PROBLEM — Z01.818 PRE-OPERATIVE CLEARANCE: Status: RESOLVED | Noted: 2018-10-09 | Resolved: 2018-11-08

## 2018-11-28 PROBLEM — Z09 POSTOP CHECK: Status: RESOLVED | Noted: 2018-10-29 | Resolved: 2018-11-28

## 2018-12-03 ENCOUNTER — ANESTHESIA EVENT (OUTPATIENT)
Dept: OPERATING ROOM | Age: 63
End: 2018-12-03
Payer: COMMERCIAL

## 2018-12-04 ENCOUNTER — HOSPITAL ENCOUNTER (OUTPATIENT)
Age: 63
Setting detail: OUTPATIENT SURGERY
Discharge: HOME | End: 2018-12-04
Attending: UROLOGY | Admitting: UROLOGY
Payer: COMMERCIAL

## 2018-12-04 ENCOUNTER — TELEPHONE (OUTPATIENT)
Dept: UROLOGY | Age: 63
End: 2018-12-04

## 2018-12-04 ENCOUNTER — ANESTHESIA (OUTPATIENT)
Dept: OPERATING ROOM | Age: 63
End: 2018-12-04
Payer: COMMERCIAL

## 2018-12-04 VITALS
RESPIRATION RATE: 18 BRPM | TEMPERATURE: 97 F | HEART RATE: 63 BPM | DIASTOLIC BLOOD PRESSURE: 76 MMHG | WEIGHT: 190 LBS | SYSTOLIC BLOOD PRESSURE: 148 MMHG | HEIGHT: 64 IN | OXYGEN SATURATION: 97 % | BODY MASS INDEX: 32.44 KG/M2

## 2018-12-04 VITALS
OXYGEN SATURATION: 99 % | DIASTOLIC BLOOD PRESSURE: 60 MMHG | RESPIRATION RATE: 9 BRPM | SYSTOLIC BLOOD PRESSURE: 119 MMHG

## 2018-12-04 DIAGNOSIS — N20.0 KIDNEY STONE: Primary | ICD-10-CM

## 2018-12-04 PROCEDURE — 6360000002 HC RX W HCPCS: Performed by: NURSE ANESTHETIST, CERTIFIED REGISTERED

## 2018-12-04 PROCEDURE — 7100000001 HC PACU RECOVERY - ADDTL 15 MIN: Performed by: UROLOGY

## 2018-12-04 PROCEDURE — 2709999900 HC NON-CHARGEABLE SUPPLY: Performed by: UROLOGY

## 2018-12-04 PROCEDURE — 2500000003 HC RX 250 WO HCPCS: Performed by: NURSE ANESTHETIST, CERTIFIED REGISTERED

## 2018-12-04 PROCEDURE — 3700000000 HC ANESTHESIA ATTENDED CARE: Performed by: UROLOGY

## 2018-12-04 PROCEDURE — 6370000000 HC RX 637 (ALT 250 FOR IP): Performed by: UROLOGY

## 2018-12-04 PROCEDURE — 6360000002 HC RX W HCPCS: Performed by: UROLOGY

## 2018-12-04 PROCEDURE — 2580000003 HC RX 258: Performed by: UROLOGY

## 2018-12-04 PROCEDURE — 7100000011 HC PHASE II RECOVERY - ADDTL 15 MIN: Performed by: UROLOGY

## 2018-12-04 PROCEDURE — 7100000000 HC PACU RECOVERY - FIRST 15 MIN: Performed by: UROLOGY

## 2018-12-04 PROCEDURE — 7100000010 HC PHASE II RECOVERY - FIRST 15 MIN: Performed by: UROLOGY

## 2018-12-04 PROCEDURE — 3700000001 HC ADD 15 MINUTES (ANESTHESIA): Performed by: UROLOGY

## 2018-12-04 PROCEDURE — 3600000014 HC SURGERY LEVEL 4 ADDTL 15MIN: Performed by: UROLOGY

## 2018-12-04 PROCEDURE — 3600000004 HC SURGERY LEVEL 4 BASE: Performed by: UROLOGY

## 2018-12-04 RX ORDER — OXYCODONE HYDROCHLORIDE 5 MG/1
5 TABLET ORAL
Status: DISCONTINUED | OUTPATIENT
Start: 2018-12-04 | End: 2018-12-04 | Stop reason: HOSPADM

## 2018-12-04 RX ORDER — PROPOFOL 10 MG/ML
INJECTION, EMULSION INTRAVENOUS CONTINUOUS PRN
Status: DISCONTINUED | OUTPATIENT
Start: 2018-12-04 | End: 2018-12-04 | Stop reason: SDUPTHER

## 2018-12-04 RX ORDER — SODIUM CHLORIDE, SODIUM LACTATE, POTASSIUM CHLORIDE, CALCIUM CHLORIDE 600; 310; 30; 20 MG/100ML; MG/100ML; MG/100ML; MG/100ML
INJECTION, SOLUTION INTRAVENOUS CONTINUOUS
Status: DISCONTINUED | OUTPATIENT
Start: 2018-12-04 | End: 2018-12-04 | Stop reason: HOSPADM

## 2018-12-04 RX ORDER — LIDOCAINE HYDROCHLORIDE 20 MG/ML
INJECTION, SOLUTION INFILTRATION; PERINEURAL PRN
Status: DISCONTINUED | OUTPATIENT
Start: 2018-12-04 | End: 2018-12-04 | Stop reason: SDUPTHER

## 2018-12-04 RX ORDER — DIMENHYDRINATE 50 MG/1
50 TABLET ORAL ONCE
Status: COMPLETED | OUTPATIENT
Start: 2018-12-04 | End: 2018-12-04

## 2018-12-04 RX ORDER — FENTANYL CITRATE 50 UG/ML
INJECTION, SOLUTION INTRAMUSCULAR; INTRAVENOUS PRN
Status: DISCONTINUED | OUTPATIENT
Start: 2018-12-04 | End: 2018-12-04 | Stop reason: SDUPTHER

## 2018-12-04 RX ORDER — SODIUM CHLORIDE 0.9 % (FLUSH) 0.9 %
10 SYRINGE (ML) INJECTION PRN
Status: DISCONTINUED | OUTPATIENT
Start: 2018-12-04 | End: 2018-12-04 | Stop reason: HOSPADM

## 2018-12-04 RX ORDER — ONDANSETRON 2 MG/ML
INJECTION INTRAMUSCULAR; INTRAVENOUS PRN
Status: DISCONTINUED | OUTPATIENT
Start: 2018-12-04 | End: 2018-12-04 | Stop reason: SDUPTHER

## 2018-12-04 RX ORDER — SODIUM CHLORIDE 0.9 % (FLUSH) 0.9 %
10 SYRINGE (ML) INJECTION EVERY 12 HOURS SCHEDULED
Status: DISCONTINUED | OUTPATIENT
Start: 2018-12-04 | End: 2018-12-04 | Stop reason: HOSPADM

## 2018-12-04 RX ORDER — METOCLOPRAMIDE HYDROCHLORIDE 5 MG/ML
10 INJECTION INTRAMUSCULAR; INTRAVENOUS
Status: DISCONTINUED | OUTPATIENT
Start: 2018-12-04 | End: 2018-12-04 | Stop reason: HOSPADM

## 2018-12-04 RX ORDER — CIPROFLOXACIN 2 MG/ML
400 INJECTION, SOLUTION INTRAVENOUS
Status: COMPLETED | OUTPATIENT
Start: 2018-12-04 | End: 2018-12-04

## 2018-12-04 RX ORDER — FENTANYL CITRATE 50 UG/ML
50 INJECTION, SOLUTION INTRAMUSCULAR; INTRAVENOUS EVERY 5 MIN PRN
Status: DISCONTINUED | OUTPATIENT
Start: 2018-12-04 | End: 2018-12-04 | Stop reason: HOSPADM

## 2018-12-04 RX ORDER — ONDANSETRON 2 MG/ML
4 INJECTION INTRAMUSCULAR; INTRAVENOUS
Status: DISCONTINUED | OUTPATIENT
Start: 2018-12-04 | End: 2018-12-04 | Stop reason: HOSPADM

## 2018-12-04 RX ORDER — DEXAMETHASONE SODIUM PHOSPHATE 4 MG/ML
INJECTION, SOLUTION INTRA-ARTICULAR; INTRALESIONAL; INTRAMUSCULAR; INTRAVENOUS; SOFT TISSUE PRN
Status: DISCONTINUED | OUTPATIENT
Start: 2018-12-04 | End: 2018-12-04 | Stop reason: SDUPTHER

## 2018-12-04 RX ORDER — PROPOFOL 10 MG/ML
INJECTION, EMULSION INTRAVENOUS PRN
Status: DISCONTINUED | OUTPATIENT
Start: 2018-12-04 | End: 2018-12-04 | Stop reason: SDUPTHER

## 2018-12-04 RX ORDER — FENTANYL CITRATE 50 UG/ML
25 INJECTION, SOLUTION INTRAMUSCULAR; INTRAVENOUS EVERY 5 MIN PRN
Status: DISCONTINUED | OUTPATIENT
Start: 2018-12-04 | End: 2018-12-04 | Stop reason: HOSPADM

## 2018-12-04 RX ORDER — ACETAMINOPHEN 325 MG/1
650 TABLET ORAL ONCE
Status: COMPLETED | OUTPATIENT
Start: 2018-12-04 | End: 2018-12-04

## 2018-12-04 RX ADMIN — ONDANSETRON 4 MG: 2 INJECTION INTRAMUSCULAR; INTRAVENOUS at 09:59

## 2018-12-04 RX ADMIN — PROPOFOL 150 MG: 10 INJECTION, EMULSION INTRAVENOUS at 09:35

## 2018-12-04 RX ADMIN — DIMENHYDRINATE 50 MG: 50 TABLET ORAL at 08:25

## 2018-12-04 RX ADMIN — FENTANYL CITRATE 50 MCG: 50 INJECTION INTRAMUSCULAR; INTRAVENOUS at 09:35

## 2018-12-04 RX ADMIN — SODIUM CHLORIDE, POTASSIUM CHLORIDE, SODIUM LACTATE AND CALCIUM CHLORIDE: 600; 310; 30; 20 INJECTION, SOLUTION INTRAVENOUS at 08:25

## 2018-12-04 RX ADMIN — DEXAMETHASONE SODIUM PHOSPHATE 8 MG: 4 INJECTION, SOLUTION INTRAMUSCULAR; INTRAVENOUS at 09:50

## 2018-12-04 RX ADMIN — ACETAMINOPHEN 650 MG: 325 TABLET, FILM COATED ORAL at 08:25

## 2018-12-04 RX ADMIN — CIPROFLOXACIN 400 MG: 2 INJECTION, SOLUTION INTRAVENOUS at 09:29

## 2018-12-04 RX ADMIN — LIDOCAINE HYDROCHLORIDE 100 MG: 20 INJECTION, SOLUTION INFILTRATION; PERINEURAL at 09:35

## 2018-12-04 RX ADMIN — PROPOFOL 100 MCG/KG/MIN: 10 INJECTION, EMULSION INTRAVENOUS at 09:38

## 2018-12-04 ASSESSMENT — PULMONARY FUNCTION TESTS
PIF_VALUE: 0
PIF_VALUE: 20
PIF_VALUE: 21
PIF_VALUE: 21
PIF_VALUE: 18
PIF_VALUE: 21
PIF_VALUE: 21
PIF_VALUE: 18
PIF_VALUE: 3
PIF_VALUE: 1
PIF_VALUE: 21
PIF_VALUE: 18
PIF_VALUE: 2
PIF_VALUE: 27
PIF_VALUE: 22
PIF_VALUE: 17
PIF_VALUE: 2
PIF_VALUE: 21
PIF_VALUE: 3
PIF_VALUE: 15
PIF_VALUE: 21
PIF_VALUE: 18
PIF_VALUE: 18
PIF_VALUE: 0
PIF_VALUE: 21
PIF_VALUE: 3
PIF_VALUE: 6
PIF_VALUE: 0
PIF_VALUE: 20
PIF_VALUE: 19
PIF_VALUE: 2
PIF_VALUE: 15
PIF_VALUE: 21

## 2018-12-04 ASSESSMENT — PAIN SCALES - GENERAL
PAINLEVEL_OUTOF10: 0

## 2018-12-04 NOTE — ANESTHESIA PRE PROCEDURE
06/22/2018    K 4.4 06/22/2018     06/22/2018    CO2 31 06/22/2018    BUN 14 06/22/2018    CREATININE 0.72 09/19/2018    GFRAA >60 09/19/2018    LABGLOM >60 09/19/2018    GLUCOSE 102 06/22/2018    GLUCOSE 98 05/08/2012    PROT 7.0 06/22/2018    CALCIUM 9.2 06/22/2018    BILITOT 0.38 06/22/2018    ALKPHOS 101 06/22/2018    AST 19 06/22/2018    ALT 28 06/22/2018       POC Tests: No results for input(s): POCGLU, POCNA, POCK, POCCL, POCBUN, POCHEMO, POCHCT in the last 72 hours. Coags: No results found for: PROTIME, INR, APTT    HCG (If Applicable): No results found for: PREGTESTUR, PREGSERUM, HCG, HCGQUANT     ABGs: No results found for: PHART, PO2ART, SOM0OHC, OUB3FJH, BEART, [id number]     Type & Screen (If Applicable):  No results found for: LABABO, LABRH    Anesthesia Evaluation     history of anesthetic complications: PONV. Airway: Mallampati: II  TM distance: >3 FB   Neck ROM: limited  Mouth opening: > = 3 FB Dental: normal exam         Pulmonary:normal exam  breath sounds clear to auscultation  (+) sleep apnea: on CPAP,                             Cardiovascular:  Exercise tolerance: good (>4 METS),   (+) hypertension:, valvular problems/murmurs (mitral valve regurg, aortic stenosis both mild):, murmur, hyperlipidemia      ECG reviewed      Echocardiogram reviewed                  Neuro/Psych:   Negative Neuro/Psych ROS              GI/Hepatic/Renal:   (+) morbid obesity          Endo/Other:    (+) : arthritis (cervical neck DJD, slightly limited extension ): OA., .                 Abdominal:   (+) obese,         Vascular: negative vascular ROS. Anesthesia Plan      general and TIVA     ASA 3       Induction: intravenous. Anesthetic plan and risks discussed with patient.                       AIDEE Daly - CRNA   12/4/2018

## 2018-12-04 NOTE — BRIEF OP NOTE
Brief Postoperative Note  ______________________________________________________________    Patient: Herlinda Bailey  YOB: 1955  MRN: [id number]  Date of Procedure: 12/4/2018    Pre-Op Diagnosis: LEFT RENAL CALCULUS    Post-Op Diagnosis: Same       Procedure(s):  ESWL EXTRACORPEAL SHOCK WAVE LITHOTRIPSY    Anesthesia: General, TIVA    Surgeon(s):  Ramón Mohamud MD    Assistant: none    Estimated Blood Loss (mL): less than 50     Complications: None    Specimens:   * No specimens in log *    Implants:  * No implants in log *      Drains:      Findings: 5mm left renal calculus    Ramón Mohamud MD  Date: 12/4/2018  Time: 9:42 AM

## 2018-12-04 NOTE — PROGRESS NOTES
Discharge Criteria    Inpatients must meet Criteria 1 through 7. All other patients are either YES or N/A. If a NO is chosen then Anesthesia or Surgeon must be notified. 1.  Minimum 30 minutes after last dose of sedative medication, minimum 120 minutes after last dose of reversal agent. Yes      2. Systolic BP stable within 20 mmHg for 30 minutes & systolic BP between 90 & 343 or within 10 mmHg of baseline. Yes      3. Pulse between 60 and 100 or within 10 bpm of baseline. Yes      4. Spontaneous respiratory rate >/= 10 per minute. Yes      5. SaO2 >/= 95 or  >/= baseline. Yes      6. Able to cough and swallow or return to baseline function. Yes      7. Alert and oriented or return to baseline mental status. Yes      8. Demonstrates controlled, coordinated movements, ambulates with steady gait, or return to baseline activity function. Yes      9. Minimal or no pain or nausea, or at a level tolerable and acceptable to patient. Yes      10. Takes and retains oral fluids as allowed. Yes      11. Procedural / perioperative site stable. Minimal or no bleeding. Yes          12. If GI endoscopy procedure, minimal or no abdominal distention or passing flatus. N/A      13. Written discharge instructions and emergency telephone number provided. Yes      14. Accompanied by a responsible adult. Yes      Adult patient discharged from facility without responsible person meets above criteria plus the following:   a) remains awake without stimulus for 30 minutes     b) oriented appropriate for age      c) all vital signs stable   d) no significant risk of losing protective reflexes      e) able to maintain pre-procedure mobility without assistance   f) no nausea or dizziness      g) transportation arrangements that do not require patient to operate motor Vehicle.      N/A

## 2018-12-06 ENCOUNTER — TELEPHONE (OUTPATIENT)
Dept: UROLOGY | Age: 63
End: 2018-12-06

## 2018-12-06 ENCOUNTER — HOSPITAL ENCOUNTER (OUTPATIENT)
Age: 63
Discharge: HOME | End: 2018-12-06
Payer: COMMERCIAL

## 2018-12-06 DIAGNOSIS — N20.0 KIDNEY STONE: Primary | ICD-10-CM

## 2018-12-06 DIAGNOSIS — N20.0 KIDNEY STONE: ICD-10-CM

## 2018-12-06 LAB
-: ABNORMAL
AMORPHOUS: ABNORMAL
BACTERIA: ABNORMAL
BILIRUBIN URINE: NEGATIVE
CASTS UA: ABNORMAL /LPF
COLOR: YELLOW
COMMENT UA: ABNORMAL
CRYSTALS, UA: ABNORMAL /HPF
EPITHELIAL CELLS UA: ABNORMAL /HPF (ref 0–25)
GLUCOSE URINE: NEGATIVE
KETONES, URINE: NEGATIVE
LEUKOCYTE ESTERASE, URINE: ABNORMAL
MUCUS: ABNORMAL
NITRITE, URINE: NEGATIVE
OTHER OBSERVATIONS UA: ABNORMAL
PH UA: 6.5 (ref 5–9)
PROTEIN UA: NEGATIVE
RBC UA: ABNORMAL /HPF (ref 0–2)
RENAL EPITHELIAL, UA: ABNORMAL /HPF
SPECIFIC GRAVITY UA: 1.01 (ref 1.01–1.02)
TRICHOMONAS: ABNORMAL
TURBIDITY: CLEAR
URINE HGB: ABNORMAL
UROBILINOGEN, URINE: NORMAL
WBC UA: ABNORMAL /HPF (ref 0–5)
YEAST: ABNORMAL

## 2018-12-06 PROCEDURE — 81001 URINALYSIS AUTO W/SCOPE: CPT

## 2018-12-06 PROCEDURE — 87086 URINE CULTURE/COLONY COUNT: CPT

## 2018-12-07 LAB
CULTURE: NORMAL
Lab: NORMAL
SPECIMEN DESCRIPTION: NORMAL
STATUS: NORMAL

## 2018-12-10 ENCOUNTER — TELEPHONE (OUTPATIENT)
Dept: UROLOGY | Age: 63
End: 2018-12-10

## 2018-12-19 ENCOUNTER — OFFICE VISIT (OUTPATIENT)
Dept: FAMILY MEDICINE CLINIC | Age: 63
End: 2018-12-19
Payer: COMMERCIAL

## 2018-12-19 VITALS
BODY MASS INDEX: 33.57 KG/M2 | WEIGHT: 196.6 LBS | HEIGHT: 64 IN | DIASTOLIC BLOOD PRESSURE: 68 MMHG | SYSTOLIC BLOOD PRESSURE: 138 MMHG

## 2018-12-19 DIAGNOSIS — E78.5 HYPERLIPIDEMIA, UNSPECIFIED HYPERLIPIDEMIA TYPE: ICD-10-CM

## 2018-12-19 DIAGNOSIS — L30.9 ECZEMA, UNSPECIFIED TYPE: ICD-10-CM

## 2018-12-19 DIAGNOSIS — I10 ESSENTIAL HYPERTENSION: ICD-10-CM

## 2018-12-19 DIAGNOSIS — R73.9 HYPERGLYCEMIA: Primary | ICD-10-CM

## 2018-12-19 PROCEDURE — G8482 FLU IMMUNIZE ORDER/ADMIN: HCPCS | Performed by: FAMILY MEDICINE

## 2018-12-19 PROCEDURE — G8427 DOCREV CUR MEDS BY ELIG CLIN: HCPCS | Performed by: FAMILY MEDICINE

## 2018-12-19 PROCEDURE — G8417 CALC BMI ABV UP PARAM F/U: HCPCS | Performed by: FAMILY MEDICINE

## 2018-12-19 PROCEDURE — 3017F COLORECTAL CA SCREEN DOC REV: CPT | Performed by: FAMILY MEDICINE

## 2018-12-19 PROCEDURE — 99214 OFFICE O/P EST MOD 30 MIN: CPT | Performed by: FAMILY MEDICINE

## 2018-12-19 PROCEDURE — 1036F TOBACCO NON-USER: CPT | Performed by: FAMILY MEDICINE

## 2018-12-19 RX ORDER — MOMETASONE FUROATE 1 MG/G
CREAM TOPICAL
Qty: 15 G | Refills: 0 | Status: SHIPPED | OUTPATIENT
Start: 2018-12-19 | End: 2019-06-12 | Stop reason: SDUPTHER

## 2019-03-04 ENCOUNTER — HOSPITAL ENCOUNTER (OUTPATIENT)
Dept: GENERAL RADIOLOGY | Age: 64
Discharge: HOME | End: 2019-03-06
Payer: COMMERCIAL

## 2019-03-04 ENCOUNTER — HOSPITAL ENCOUNTER (OUTPATIENT)
Age: 64
Discharge: HOME | End: 2019-03-06
Payer: COMMERCIAL

## 2019-03-04 DIAGNOSIS — N20.0 KIDNEY STONE: ICD-10-CM

## 2019-03-04 PROCEDURE — 74018 RADEX ABDOMEN 1 VIEW: CPT

## 2019-03-06 ENCOUNTER — OFFICE VISIT (OUTPATIENT)
Dept: UROLOGY | Age: 64
End: 2019-03-06
Payer: COMMERCIAL

## 2019-03-06 VITALS — DIASTOLIC BLOOD PRESSURE: 70 MMHG | SYSTOLIC BLOOD PRESSURE: 130 MMHG | BODY MASS INDEX: 34.16 KG/M2 | WEIGHT: 199 LBS

## 2019-03-06 DIAGNOSIS — R31.0 GROSS HEMATURIA: ICD-10-CM

## 2019-03-06 DIAGNOSIS — N20.0 RENAL STONE: Primary | ICD-10-CM

## 2019-03-06 PROCEDURE — G8417 CALC BMI ABV UP PARAM F/U: HCPCS | Performed by: NURSE PRACTITIONER

## 2019-03-06 PROCEDURE — 99213 OFFICE O/P EST LOW 20 MIN: CPT | Performed by: NURSE PRACTITIONER

## 2019-03-06 PROCEDURE — 3017F COLORECTAL CA SCREEN DOC REV: CPT | Performed by: NURSE PRACTITIONER

## 2019-03-06 PROCEDURE — 1036F TOBACCO NON-USER: CPT | Performed by: NURSE PRACTITIONER

## 2019-03-06 PROCEDURE — G8482 FLU IMMUNIZE ORDER/ADMIN: HCPCS | Performed by: NURSE PRACTITIONER

## 2019-03-06 PROCEDURE — G8427 DOCREV CUR MEDS BY ELIG CLIN: HCPCS | Performed by: NURSE PRACTITIONER

## 2019-03-06 ASSESSMENT — ENCOUNTER SYMPTOMS
BACK PAIN: 0
EYE PAIN: 0
COLOR CHANGE: 0
CONSTIPATION: 0
COUGH: 0
VOMITING: 0
EYE REDNESS: 0
ABDOMINAL PAIN: 0
NAUSEA: 0
WHEEZING: 0
SHORTNESS OF BREATH: 0

## 2019-03-18 ENCOUNTER — OFFICE VISIT (OUTPATIENT)
Dept: FAMILY MEDICINE CLINIC | Age: 64
End: 2019-03-18
Payer: COMMERCIAL

## 2019-03-18 VITALS
SYSTOLIC BLOOD PRESSURE: 140 MMHG | BODY MASS INDEX: 33.97 KG/M2 | WEIGHT: 199 LBS | HEIGHT: 64 IN | DIASTOLIC BLOOD PRESSURE: 68 MMHG

## 2019-03-18 DIAGNOSIS — H92.09 OTALGIA, UNSPECIFIED LATERALITY: ICD-10-CM

## 2019-03-18 DIAGNOSIS — L08.9 INFECTED SEBACEOUS CYST: ICD-10-CM

## 2019-03-18 DIAGNOSIS — L72.3 INFECTED SEBACEOUS CYST: ICD-10-CM

## 2019-03-18 DIAGNOSIS — L02.92 FURUNCLE: Primary | ICD-10-CM

## 2019-03-18 DIAGNOSIS — H69.80 DYSFUNCTION OF EUSTACHIAN TUBE, UNSPECIFIED LATERALITY: ICD-10-CM

## 2019-03-18 PROCEDURE — 92567 TYMPANOMETRY: CPT | Performed by: FAMILY MEDICINE

## 2019-03-18 RX ORDER — CEPHALEXIN 500 MG/1
500 CAPSULE ORAL 3 TIMES DAILY
Qty: 21 CAPSULE | Refills: 0 | Status: SHIPPED | OUTPATIENT
Start: 2019-03-18 | End: 2019-03-25

## 2019-03-18 ASSESSMENT — PATIENT HEALTH QUESTIONNAIRE - PHQ9
SUM OF ALL RESPONSES TO PHQ QUESTIONS 1-9: 0
1. LITTLE INTEREST OR PLEASURE IN DOING THINGS: 0
SUM OF ALL RESPONSES TO PHQ QUESTIONS 1-9: 0
SUM OF ALL RESPONSES TO PHQ9 QUESTIONS 1 & 2: 0
2. FEELING DOWN, DEPRESSED OR HOPELESS: 0

## 2019-03-20 ENCOUNTER — OFFICE VISIT (OUTPATIENT)
Dept: FAMILY MEDICINE CLINIC | Age: 64
End: 2019-03-20
Payer: COMMERCIAL

## 2019-03-20 VITALS
HEIGHT: 64 IN | WEIGHT: 200 LBS | SYSTOLIC BLOOD PRESSURE: 136 MMHG | DIASTOLIC BLOOD PRESSURE: 74 MMHG | BODY MASS INDEX: 34.15 KG/M2

## 2019-03-20 DIAGNOSIS — L02.222 FURUNCLE OF BACK, EXCEPT BUTTOCK: Primary | ICD-10-CM

## 2019-03-20 PROCEDURE — 99212 OFFICE O/P EST SF 10 MIN: CPT | Performed by: FAMILY MEDICINE

## 2019-03-20 PROCEDURE — G8417 CALC BMI ABV UP PARAM F/U: HCPCS | Performed by: FAMILY MEDICINE

## 2019-03-20 PROCEDURE — G8427 DOCREV CUR MEDS BY ELIG CLIN: HCPCS | Performed by: FAMILY MEDICINE

## 2019-03-20 PROCEDURE — 3017F COLORECTAL CA SCREEN DOC REV: CPT | Performed by: FAMILY MEDICINE

## 2019-03-20 PROCEDURE — G8482 FLU IMMUNIZE ORDER/ADMIN: HCPCS | Performed by: FAMILY MEDICINE

## 2019-03-20 PROCEDURE — 1036F TOBACCO NON-USER: CPT | Performed by: FAMILY MEDICINE

## 2019-06-05 ENCOUNTER — HOSPITAL ENCOUNTER (OUTPATIENT)
Age: 64
Discharge: HOME | End: 2019-06-05
Payer: COMMERCIAL

## 2019-06-05 DIAGNOSIS — R73.9 HYPERGLYCEMIA: ICD-10-CM

## 2019-06-05 DIAGNOSIS — E78.5 HYPERLIPIDEMIA, UNSPECIFIED HYPERLIPIDEMIA TYPE: ICD-10-CM

## 2019-06-05 LAB
ALT SERPL-CCNC: 26 U/L (ref 5–33)
ANION GAP SERPL CALCULATED.3IONS-SCNC: 7 MMOL/L (ref 9–17)
AST SERPL-CCNC: 20 U/L
BUN BLDV-MCNC: 16 MG/DL (ref 8–23)
BUN/CREAT BLD: 23 (ref 9–20)
CALCIUM SERPL-MCNC: 9.2 MG/DL (ref 8.6–10.4)
CHLORIDE BLD-SCNC: 106 MMOL/L (ref 98–107)
CHOLESTEROL/HDL RATIO: 2.3
CHOLESTEROL: 108 MG/DL
CO2: 32 MMOL/L (ref 20–31)
CREAT SERPL-MCNC: 0.71 MG/DL (ref 0.5–0.9)
ESTIMATED AVERAGE GLUCOSE: 120 MG/DL
GFR AFRICAN AMERICAN: >60 ML/MIN
GFR NON-AFRICAN AMERICAN: >60 ML/MIN
GFR SERPL CREATININE-BSD FRML MDRD: ABNORMAL ML/MIN/{1.73_M2}
GFR SERPL CREATININE-BSD FRML MDRD: ABNORMAL ML/MIN/{1.73_M2}
GLUCOSE BLD-MCNC: 98 MG/DL (ref 70–99)
HBA1C MFR BLD: 5.8 % (ref 4.8–5.9)
HCT VFR BLD CALC: 41.5 % (ref 36.3–47.1)
HDLC SERPL-MCNC: 46 MG/DL
HEMOGLOBIN: 12.7 G/DL (ref 11.9–15.1)
HIGH SENSITIVE C-REACTIVE PROTEIN: 5.5 MG/L
LDL CHOLESTEROL: 45 MG/DL (ref 0–130)
MCH RBC QN AUTO: 27.9 PG (ref 25.2–33.5)
MCHC RBC AUTO-ENTMCNC: 30.6 G/DL (ref 28.4–34.8)
MCV RBC AUTO: 91.2 FL (ref 82.6–102.9)
NRBC AUTOMATED: 0 PER 100 WBC
PDW BLD-RTO: 12.8 % (ref 11.8–14.4)
PLATELET # BLD: 215 K/UL (ref 138–453)
PMV BLD AUTO: 9.6 FL (ref 8.1–13.5)
POTASSIUM SERPL-SCNC: 4.7 MMOL/L (ref 3.7–5.3)
RBC # BLD: 4.55 M/UL (ref 3.95–5.11)
SODIUM BLD-SCNC: 145 MMOL/L (ref 135–144)
TRIGL SERPL-MCNC: 83 MG/DL
VLDLC SERPL CALC-MCNC: NORMAL MG/DL (ref 1–30)
WBC # BLD: 6.6 K/UL (ref 3.5–11.3)

## 2019-06-05 PROCEDURE — 80061 LIPID PANEL: CPT

## 2019-06-05 PROCEDURE — 86141 C-REACTIVE PROTEIN HS: CPT

## 2019-06-05 PROCEDURE — 83036 HEMOGLOBIN GLYCOSYLATED A1C: CPT

## 2019-06-05 PROCEDURE — 85027 COMPLETE CBC AUTOMATED: CPT

## 2019-06-05 PROCEDURE — 84450 TRANSFERASE (AST) (SGOT): CPT

## 2019-06-05 PROCEDURE — 36415 COLL VENOUS BLD VENIPUNCTURE: CPT

## 2019-06-05 PROCEDURE — 80048 BASIC METABOLIC PNL TOTAL CA: CPT

## 2019-06-05 PROCEDURE — 84460 ALANINE AMINO (ALT) (SGPT): CPT

## 2019-06-12 ENCOUNTER — OFFICE VISIT (OUTPATIENT)
Dept: FAMILY MEDICINE CLINIC | Age: 64
End: 2019-06-12
Payer: COMMERCIAL

## 2019-06-12 VITALS
DIASTOLIC BLOOD PRESSURE: 68 MMHG | WEIGHT: 200 LBS | HEIGHT: 64 IN | SYSTOLIC BLOOD PRESSURE: 126 MMHG | BODY MASS INDEX: 34.15 KG/M2

## 2019-06-12 DIAGNOSIS — E78.5 HYPERLIPIDEMIA, UNSPECIFIED HYPERLIPIDEMIA TYPE: ICD-10-CM

## 2019-06-12 DIAGNOSIS — R73.9 HYPERGLYCEMIA: ICD-10-CM

## 2019-06-12 DIAGNOSIS — B35.3 TINEA PEDIS, UNSPECIFIED LATERALITY: ICD-10-CM

## 2019-06-12 DIAGNOSIS — I10 ESSENTIAL HYPERTENSION: Primary | ICD-10-CM

## 2019-06-12 DIAGNOSIS — I50.32 CHRONIC DIASTOLIC CONGESTIVE HEART FAILURE (HCC): ICD-10-CM

## 2019-06-12 PROCEDURE — G8427 DOCREV CUR MEDS BY ELIG CLIN: HCPCS | Performed by: FAMILY MEDICINE

## 2019-06-12 PROCEDURE — 99214 OFFICE O/P EST MOD 30 MIN: CPT | Performed by: FAMILY MEDICINE

## 2019-06-12 PROCEDURE — G8417 CALC BMI ABV UP PARAM F/U: HCPCS | Performed by: FAMILY MEDICINE

## 2019-06-12 PROCEDURE — 3017F COLORECTAL CA SCREEN DOC REV: CPT | Performed by: FAMILY MEDICINE

## 2019-06-12 PROCEDURE — 1036F TOBACCO NON-USER: CPT | Performed by: FAMILY MEDICINE

## 2019-06-12 RX ORDER — MOMETASONE FUROATE 1 MG/G
CREAM TOPICAL
Qty: 15 G | Refills: 0 | Status: SHIPPED | OUTPATIENT
Start: 2019-06-12 | End: 2021-08-10 | Stop reason: CLARIF

## 2019-06-12 NOTE — PATIENT INSTRUCTIONS
PLAN:  I recommend that she get Lamisil cream to apply to the left foot twice a day to resolve the fungus on the foot. I recommend getting the sprays for the shoes as well. I educated on keeping shoes dry before wearing. Alternate shoes. I had a lengthy discussion related to diet and sodium control, meal times, and need to incorporate exercise into her daily regimen to promote weight loss. If she can do this, I feel that this will decrease swelling, weight, and joint pain. Dietician offered but she states she feels this is more due to her lack of motivation. I will see her back in 6 months with labs prior. SURVEY:    You may be receiving a survey from [employer] regarding your visit today. Please complete the survey to enable us to provide the highest quality of care to you and your family. If you cannot score us a very good on any question, please call the office to discuss how we could have made your experience a very good one. Thank you.

## 2019-06-12 NOTE — PROGRESS NOTES
chest pain at rest. Denies irregular heartbeat. Denies palpitations. When she is stressed or on exertion she has the chest pain, irregular heart beat and palpations. These symptoms go away when she sits down and relaxes a bit. Gastrointestinal: Denies abdominal pain. Denies blood in the stool. Denies constipation. Denies diarrhea. Denies nausea. Denies vomiting. Genitourinary: Denies blood in the urine. Denies difficulty urinating. Denies frequent urination. Denies painful urination. Denies urinary incontinence. Musculoskeletal: Denies muscle aches. Admits painful joints, bilateral ankles. Denies swollen joints. Bilateral leg swelling, pain as well from the swelling. Peripheral Vascular: Denies pain/cramping in legs after exertion. Skin: Denies dry skin. Denies itching. Denies rash. Admits rash to the outside of her left foot, it is raised and pink in color. She does apply a topical cream to it. Neurologic: Denies falls. Denies dizziness. Denies fainting. Denies tingling/numbness. Admits numbness to the outside of her left foot after showering. Psychiatric: Denies sleep disturbance. Denies anxiety. Denies depressed mood. She recently found out that she is not able to retire for another 2 years.     Past Surgical History:   Procedure Laterality Date    ABDOMEN SURGERY  10/12/2018    pelvic mass removed     COLONOSCOPY  08/13/2018    -polyp(benign lymphoid)hemorrhoids    COLONOSCOPY N/A 8/13/2018    COLONOSCOPY POLYPECTOMY SNARE/COLD BIOPSY performed by Saray Conner MD at [facility]  2008    Dr. Nicole Yan  2009    HYSTERECTOMY  2009    supracervical, BSO with cervical conization, Dr. Teja Soto Patella ESWL Left 12/4/2018    ESWL [address] LITHOTRIPSY performed by Juliocesar Liu MD at [facility] History   Problem Relation Age of Onset    High CRP,High Sensitivity   4. Tinea pedis, unspecified laterality     5. Chronic diastolic congestive heart failure ([address]         PLAN:  I recommend that she get Lamisil cream to apply to the left foot twice a day to resolve the fungus on the foot. I recommend getting the sprays for the shoes as well. I educated on keeping shoes dry before wearing. Alternate shoes. I had a lengthy discussion related to diet and sodium control, meal times, and need to incorporate exercise into her daily regimen to promote weight loss. If she can do this, I feel that this will decrease swelling, weight, and joint pain. Dietician offered but she states she feels this is more due to her lack of motivation. I will see her back in 6 months with labs prior. Orders Placed This Encounter   Procedures    ALT     Standing Status:   Future     Standing Expiration Date:   6/11/2020    AST     Standing Status:   Future     Standing Expiration Date:   6/11/2020    Basic Metabolic Panel     Standing Status:   Future     Standing Expiration Date:   6/11/2020    CBC     Standing Status:   Future     Standing Expiration Date:   6/11/2020    CRP,High Sensitivity     Standing Status:   Future     Standing Expiration Date:   6/11/2020    Hemoglobin A1C     Standing Status:   Future     Standing Expiration Date:   6/11/2020     Orders Placed This Encounter   Medications    mometasone (ELOCON) 0.1 % cream     Sig: Apply topically daily. Dispense:  15 g     Refill:  0   I, Dr. Elida Brownlee, personally performed the services described in this documentation as scribed by Saw Jalloh in my presence, and is both accurate and complete.

## 2019-08-21 RX ORDER — ATORVASTATIN CALCIUM 20 MG/1
TABLET, FILM COATED ORAL
Qty: 90 TABLET | Refills: 3 | Status: SHIPPED | OUTPATIENT
Start: 2019-08-21 | End: 2019-09-30 | Stop reason: SDUPTHER

## 2019-09-17 ENCOUNTER — OFFICE VISIT (OUTPATIENT)
Dept: CARDIOLOGY | Age: 64
End: 2019-09-17
Payer: COMMERCIAL

## 2019-09-17 VITALS
DIASTOLIC BLOOD PRESSURE: 71 MMHG | HEIGHT: 64 IN | SYSTOLIC BLOOD PRESSURE: 138 MMHG | HEART RATE: 70 BPM | WEIGHT: 202.8 LBS | RESPIRATION RATE: 18 BRPM | OXYGEN SATURATION: 99 % | BODY MASS INDEX: 34.62 KG/M2

## 2019-09-17 DIAGNOSIS — I10 ESSENTIAL HYPERTENSION: ICD-10-CM

## 2019-09-17 DIAGNOSIS — R06.02 SOB (SHORTNESS OF BREATH): ICD-10-CM

## 2019-09-17 DIAGNOSIS — I34.0 NON-RHEUMATIC MITRAL REGURGITATION: Primary | ICD-10-CM

## 2019-09-17 DIAGNOSIS — E66.9 CLASS 1 OBESITY WITH BODY MASS INDEX (BMI) OF 33.0 TO 33.9 IN ADULT, UNSPECIFIED OBESITY TYPE, UNSPECIFIED WHETHER SERIOUS COMORBIDITY PRESENT: ICD-10-CM

## 2019-09-17 PROCEDURE — 1036F TOBACCO NON-USER: CPT | Performed by: INTERNAL MEDICINE

## 2019-09-17 PROCEDURE — G8417 CALC BMI ABV UP PARAM F/U: HCPCS | Performed by: INTERNAL MEDICINE

## 2019-09-17 PROCEDURE — 93000 ELECTROCARDIOGRAM COMPLETE: CPT | Performed by: INTERNAL MEDICINE

## 2019-09-17 PROCEDURE — 99213 OFFICE O/P EST LOW 20 MIN: CPT | Performed by: INTERNAL MEDICINE

## 2019-09-17 PROCEDURE — 3017F COLORECTAL CA SCREEN DOC REV: CPT | Performed by: INTERNAL MEDICINE

## 2019-09-17 PROCEDURE — G8427 DOCREV CUR MEDS BY ELIG CLIN: HCPCS | Performed by: INTERNAL MEDICINE

## 2019-09-17 NOTE — PROGRESS NOTES
Luis Ramirez am scribing for and in the presence of Rex Plascencia MD.    Subjective:     CHIEF COMPLAINT / HPI:    Chief Complaint   Patient presents with    1 Year Follow Up     Ekg done today. HX:Non rheumatic regurg, MVP, HTN, SOB, obese Pt is here for yearly follow up she states she is doing well Denies: CP, SOB, lightheaded/dizziness, palpitations     Dear Dr. Jemal Grayson MD    I had the pleasure of seeing Antione Boyd for follow up today. Paulino Rodriguez is 58 y.o. female with past medical history of hyperlipidemia, hypertension and mitral regurgitation. No history of coronary artery disease, myocardial infarction, heart failure or significant arrhythmia. Negative stress Echo in 2015    Her risk factors for coronary artery disease includes hypertension, hyperlipidemia and family history of premature coronary artery disease. Ms. Lizette Doherty is here for her yearly follow up today. She states she is doing well besides stress from family issues. No worsening shortness of breath, orthopnea or PND. Occasional palpitations at times but no lightheaded or dizziness and thinks it is due to stress. No history of consciousness. She had cyst removed from her uterus since her last visit. No other procedures. No emergency room visits or hospitalization. Reports sleeping ok at night. Denies any fever or cough. ECG done today in office 9/17/2019- Normal sinus rhythm, normal ECG    Echo done on 8/6/2018- EF >55%. The LV wall thickness is mildly incrased. Moderate mitral regurg. Mild aortic stenosis. Mild aortic regurg. Mild tricuspid regurg. Evidence of mild (grade I) diastolic dysfucntion is seen. Past Medical History:    Past Medical History:   Diagnosis Date    Allergic rhinitis 1992    DDD (degenerative disc disease), cervical 2013    H/O echocardiogram 8/11/15    EF 65%. Aortic valve sclerosis without stenosis. Mild aortic regurgitation.   Myxomatous thickening of the mitral leaflets

## 2019-09-17 NOTE — PATIENT INSTRUCTIONS
SURVEY:    You may be receiving a survey from "[employer] regarding your visit today. Please complete the survey to enable us to provide the highest quality of care to you and your family. If you cannot score us a very good on any question, please call the office to discuss how we could have made your experience a very good one. Thank you.

## 2019-09-30 RX ORDER — ATORVASTATIN CALCIUM 20 MG/1
TABLET, FILM COATED ORAL
Qty: 90 TABLET | Refills: 3 | Status: SHIPPED | OUTPATIENT
Start: 2019-09-30 | End: 2020-09-17

## 2019-10-14 RX ORDER — FUROSEMIDE 40 MG/1
40 TABLET ORAL DAILY
Qty: 90 TABLET | Refills: 3 | Status: SHIPPED | OUTPATIENT
Start: 2019-10-14 | End: 2020-09-25

## 2019-11-29 ENCOUNTER — HOSPITAL ENCOUNTER (OUTPATIENT)
Age: 64
Discharge: HOME | End: 2019-11-29
Payer: COMMERCIAL

## 2019-11-29 DIAGNOSIS — R73.9 HYPERGLYCEMIA: ICD-10-CM

## 2019-11-29 DIAGNOSIS — E78.5 HYPERLIPIDEMIA, UNSPECIFIED HYPERLIPIDEMIA TYPE: ICD-10-CM

## 2019-11-29 LAB
ALT SERPL-CCNC: 28 U/L (ref 5–33)
ANION GAP SERPL CALCULATED.3IONS-SCNC: 7 MMOL/L (ref 9–17)
AST SERPL-CCNC: 21 U/L
BUN BLDV-MCNC: 13 MG/DL (ref 8–23)
BUN/CREAT BLD: 19 (ref 9–20)
CALCIUM SERPL-MCNC: 9.2 MG/DL (ref 8.6–10.4)
CHLORIDE BLD-SCNC: 104 MMOL/L (ref 98–107)
CO2: 31 MMOL/L (ref 20–31)
CREAT SERPL-MCNC: 0.7 MG/DL (ref 0.5–0.9)
ESTIMATED AVERAGE GLUCOSE: 123 MG/DL
GFR AFRICAN AMERICAN: >60 ML/MIN
GFR NON-AFRICAN AMERICAN: >60 ML/MIN
GFR SERPL CREATININE-BSD FRML MDRD: ABNORMAL ML/MIN/{1.73_M2}
GFR SERPL CREATININE-BSD FRML MDRD: ABNORMAL ML/MIN/{1.73_M2}
GLUCOSE BLD-MCNC: 101 MG/DL (ref 70–99)
HBA1C MFR BLD: 5.9 % (ref 4.8–5.9)
HCT VFR BLD CALC: 41.6 % (ref 36.3–47.1)
HEMOGLOBIN: 12.8 G/DL (ref 11.9–15.1)
HIGH SENSITIVE C-REACTIVE PROTEIN: 7.4 MG/L
MCH RBC QN AUTO: 28.1 PG (ref 25.2–33.5)
MCHC RBC AUTO-ENTMCNC: 30.8 G/DL (ref 28.4–34.8)
MCV RBC AUTO: 91.4 FL (ref 82.6–102.9)
NRBC AUTOMATED: 0 PER 100 WBC
PDW BLD-RTO: 12.8 % (ref 11.8–14.4)
PLATELET # BLD: 218 K/UL (ref 138–453)
PMV BLD AUTO: 9.8 FL (ref 8.1–13.5)
POTASSIUM SERPL-SCNC: 4.5 MMOL/L (ref 3.7–5.3)
RBC # BLD: 4.55 M/UL (ref 3.95–5.11)
SODIUM BLD-SCNC: 142 MMOL/L (ref 135–144)
WBC # BLD: 6.5 K/UL (ref 3.5–11.3)

## 2019-11-29 PROCEDURE — 84460 ALANINE AMINO (ALT) (SGPT): CPT

## 2019-11-29 PROCEDURE — 85027 COMPLETE CBC AUTOMATED: CPT

## 2019-11-29 PROCEDURE — 86141 C-REACTIVE PROTEIN HS: CPT

## 2019-11-29 PROCEDURE — 83036 HEMOGLOBIN GLYCOSYLATED A1C: CPT

## 2019-11-29 PROCEDURE — 36415 COLL VENOUS BLD VENIPUNCTURE: CPT

## 2019-11-29 PROCEDURE — 80048 BASIC METABOLIC PNL TOTAL CA: CPT

## 2019-11-29 PROCEDURE — 84450 TRANSFERASE (AST) (SGOT): CPT

## 2019-12-06 ENCOUNTER — HOSPITAL ENCOUNTER (OUTPATIENT)
Age: 64
Discharge: HOME | End: 2019-12-06
Payer: COMMERCIAL

## 2019-12-06 ENCOUNTER — OFFICE VISIT (OUTPATIENT)
Dept: FAMILY MEDICINE CLINIC | Age: 64
End: 2019-12-06
Payer: COMMERCIAL

## 2019-12-06 VITALS
DIASTOLIC BLOOD PRESSURE: 80 MMHG | SYSTOLIC BLOOD PRESSURE: 134 MMHG | HEIGHT: 64 IN | WEIGHT: 202 LBS | BODY MASS INDEX: 34.49 KG/M2

## 2019-12-06 DIAGNOSIS — E78.5 HYPERLIPIDEMIA, UNSPECIFIED HYPERLIPIDEMIA TYPE: ICD-10-CM

## 2019-12-06 DIAGNOSIS — M19.90 INFLAMMATORY ARTHRITIS: ICD-10-CM

## 2019-12-06 DIAGNOSIS — I10 ESSENTIAL HYPERTENSION: ICD-10-CM

## 2019-12-06 DIAGNOSIS — M19.90 INFLAMMATORY ARTHRITIS: Primary | ICD-10-CM

## 2019-12-06 DIAGNOSIS — I34.0 MODERATE MITRAL REGURGITATION: ICD-10-CM

## 2019-12-06 DIAGNOSIS — I35.0 MILD AORTIC STENOSIS: ICD-10-CM

## 2019-12-06 LAB
RHEUMATOID FACTOR: <10 IU/ML
SEDIMENTATION RATE, ERYTHROCYTE: 23 MM (ref 0–20)
URIC ACID: 3.9 MG/DL (ref 2.4–5.7)

## 2019-12-06 PROCEDURE — 99214 OFFICE O/P EST MOD 30 MIN: CPT | Performed by: FAMILY MEDICINE

## 2019-12-06 PROCEDURE — 86431 RHEUMATOID FACTOR QUANT: CPT

## 2019-12-06 PROCEDURE — 36415 COLL VENOUS BLD VENIPUNCTURE: CPT

## 2019-12-06 PROCEDURE — 85651 RBC SED RATE NONAUTOMATED: CPT

## 2019-12-06 PROCEDURE — 86200 CCP ANTIBODY: CPT

## 2019-12-06 PROCEDURE — 84550 ASSAY OF BLOOD/URIC ACID: CPT

## 2019-12-06 RX ORDER — MELOXICAM 15 MG/1
15 TABLET ORAL DAILY
Qty: 30 TABLET | Refills: 1 | Status: SHIPPED | OUTPATIENT
Start: 2019-12-06 | End: 2020-01-28

## 2019-12-06 ASSESSMENT — PATIENT HEALTH QUESTIONNAIRE - PHQ9
1. LITTLE INTEREST OR PLEASURE IN DOING THINGS: 0
SUM OF ALL RESPONSES TO PHQ QUESTIONS 1-9: 0
2. FEELING DOWN, DEPRESSED OR HOPELESS: 0
SUM OF ALL RESPONSES TO PHQ QUESTIONS 1-9: 0
SUM OF ALL RESPONSES TO PHQ9 QUESTIONS 1 & 2: 0

## 2019-12-09 LAB — CCP IGG ANTIBODIES: <1.5 U/ML

## 2020-01-10 ENCOUNTER — OFFICE VISIT (OUTPATIENT)
Dept: FAMILY MEDICINE CLINIC | Age: 65
End: 2020-01-10
Payer: COMMERCIAL

## 2020-01-10 ENCOUNTER — HOSPITAL ENCOUNTER (OUTPATIENT)
Age: 65
Discharge: HOME | End: 2020-01-10
Payer: COMMERCIAL

## 2020-01-10 VITALS
HEIGHT: 64 IN | SYSTOLIC BLOOD PRESSURE: 136 MMHG | DIASTOLIC BLOOD PRESSURE: 84 MMHG | BODY MASS INDEX: 34.42 KG/M2 | WEIGHT: 201.6 LBS

## 2020-01-10 PROBLEM — M19.90 OSTEOARTHRITIS: Status: ACTIVE | Noted: 2020-01-10

## 2020-01-10 PROBLEM — M17.9 OSTEOARTHRITIS OF KNEE: Status: ACTIVE | Noted: 2020-01-10

## 2020-01-10 LAB
ALT SERPL-CCNC: 32 U/L (ref 5–33)
ANION GAP SERPL CALCULATED.3IONS-SCNC: 10 MMOL/L (ref 9–17)
AST SERPL-CCNC: 20 U/L
BUN BLDV-MCNC: 16 MG/DL (ref 8–23)
BUN/CREAT BLD: 23 (ref 9–20)
CALCIUM SERPL-MCNC: 9.7 MG/DL (ref 8.6–10.4)
CHLORIDE BLD-SCNC: 105 MMOL/L (ref 98–107)
CO2: 29 MMOL/L (ref 20–31)
CREAT SERPL-MCNC: 0.71 MG/DL (ref 0.5–0.9)
GFR AFRICAN AMERICAN: >60 ML/MIN
GFR NON-AFRICAN AMERICAN: >60 ML/MIN
GFR SERPL CREATININE-BSD FRML MDRD: ABNORMAL ML/MIN/{1.73_M2}
GFR SERPL CREATININE-BSD FRML MDRD: ABNORMAL ML/MIN/{1.73_M2}
GLUCOSE BLD-MCNC: 81 MG/DL (ref 70–99)
HCT VFR BLD CALC: 43 % (ref 36.3–47.1)
HEMOGLOBIN: 13.5 G/DL (ref 11.9–15.1)
MCH RBC QN AUTO: 28.4 PG (ref 25.2–33.5)
MCHC RBC AUTO-ENTMCNC: 31.4 G/DL (ref 28.4–34.8)
MCV RBC AUTO: 90.3 FL (ref 82.6–102.9)
NRBC AUTOMATED: 0 PER 100 WBC
PDW BLD-RTO: 12.7 % (ref 11.8–14.4)
PLATELET # BLD: 232 K/UL (ref 138–453)
PMV BLD AUTO: 9.3 FL (ref 8.1–13.5)
POTASSIUM SERPL-SCNC: 4.9 MMOL/L (ref 3.7–5.3)
RBC # BLD: 4.76 M/UL (ref 3.95–5.11)
SODIUM BLD-SCNC: 144 MMOL/L (ref 135–144)
WBC # BLD: 8.4 K/UL (ref 3.5–11.3)

## 2020-01-10 PROCEDURE — 36415 COLL VENOUS BLD VENIPUNCTURE: CPT

## 2020-01-10 PROCEDURE — 1036F TOBACCO NON-USER: CPT | Performed by: FAMILY MEDICINE

## 2020-01-10 PROCEDURE — G8484 FLU IMMUNIZE NO ADMIN: HCPCS | Performed by: FAMILY MEDICINE

## 2020-01-10 PROCEDURE — 85027 COMPLETE CBC AUTOMATED: CPT

## 2020-01-10 PROCEDURE — 1123F ACP DISCUSS/DSCN MKR DOCD: CPT | Performed by: FAMILY MEDICINE

## 2020-01-10 PROCEDURE — 99213 OFFICE O/P EST LOW 20 MIN: CPT | Performed by: FAMILY MEDICINE

## 2020-01-10 PROCEDURE — 4040F PNEUMOC VAC/ADMIN/RCVD: CPT | Performed by: FAMILY MEDICINE

## 2020-01-10 PROCEDURE — 80048 BASIC METABOLIC PNL TOTAL CA: CPT

## 2020-01-10 PROCEDURE — G8399 PT W/DXA RESULTS DOCUMENT: HCPCS | Performed by: FAMILY MEDICINE

## 2020-01-10 PROCEDURE — G8427 DOCREV CUR MEDS BY ELIG CLIN: HCPCS | Performed by: FAMILY MEDICINE

## 2020-01-10 PROCEDURE — G8417 CALC BMI ABV UP PARAM F/U: HCPCS | Performed by: FAMILY MEDICINE

## 2020-01-10 PROCEDURE — 3017F COLORECTAL CA SCREEN DOC REV: CPT | Performed by: FAMILY MEDICINE

## 2020-01-10 PROCEDURE — 1090F PRES/ABSN URINE INCON ASSESS: CPT | Performed by: FAMILY MEDICINE

## 2020-01-10 PROCEDURE — 84460 ALANINE AMINO (ALT) (SGPT): CPT

## 2020-01-10 PROCEDURE — 84450 TRANSFERASE (AST) (SGOT): CPT

## 2020-01-10 RX ORDER — AMOXICILLIN AND CLAVULANATE POTASSIUM 875; 125 MG/1; MG/1
1 TABLET, FILM COATED ORAL 2 TIMES DAILY
Qty: 20 TABLET | Refills: 0 | Status: SHIPPED | OUTPATIENT
Start: 2020-01-10 | End: 2020-01-20

## 2020-01-10 ASSESSMENT — PATIENT HEALTH QUESTIONNAIRE - PHQ9
SUM OF ALL RESPONSES TO PHQ QUESTIONS 1-9: 0
1. LITTLE INTEREST OR PLEASURE IN DOING THINGS: 0
2. FEELING DOWN, DEPRESSED OR HOPELESS: 0
SUM OF ALL RESPONSES TO PHQ QUESTIONS 1-9: 0
SUM OF ALL RESPONSES TO PHQ9 QUESTIONS 1 & 2: 0

## 2020-01-10 NOTE — PROGRESS NOTES
I, Kim Humphreys, WILL, am scribing for and in the presence of Dr. Michelle Pryor. 01/10/2020 8:39 am [address]  [address] [facility] [address] [facility]  [place] [phone]  Dept: [phone]    Smooth Mahmood is a 71 y.o. female here for Follow-up      HPI:  Pt. Presents for 1 month follow up on inflammatory arthritis. On 12/06, she was started on meloxicam 15 mg daily. Today, she states that that meloxicam seems to be working really well she isn't having the issues with her hands like she was. She states that her knee pain is about the same. Pain is constant worse with flexion or kneeling but this is tolerable. She did notice some dizziness when she first started meloxicam but no longer has this. Prior to Admission medications    Medication Sig Start Date End Date Taking? Authorizing Provider   amoxicillin-clavulanate (AUGMENTIN) 875-125 MG per tablet Take 1 tablet by mouth 2 times daily for 10 days 1/10/20 1/20/20 Yes Michelle Pryor MD   meloxicam [facility]) 15 MG tablet Take 1 tablet by mouth daily 12/6/19  Yes Michelle Pryor MD   furosemide (LASIX) 40 MG tablet Take 1 tablet by mouth daily 10/14/19  Yes Michelle Pryor MD   atorvastatin (LIPITOR) 20 MG tablet TAKE ONE TABLET BY MOUTH DAILY 9/30/19  Yes Michelle Pryor MD   mometasone (ELOCON) 0.1 % cream Apply topically daily. 6/12/19  Yes Michelle Pryor MD   aspirin 81 MG tablet Take 81 mg by mouth daily   Yes Historical Provider, MD   calcium carbonate (OSCAL) 500 MG TABS tablet Take 500 mg by mouth daily   Yes Historical Provider, MD   Multiple Vitamins-Minerals (ALIVE ONCE DAILY WOMENS 50+ PO) Take by mouth   Yes Historical Provider, MD   Ascorbic Acid (VITAMIN C) 250 MG tablet Take 250 mg by mouth daily   Yes Historical Provider, MD   lovastatin (MEVACOR) 20 MG tablet Take 1 tablet by mouth daily 4/5/16 8/23/16  Michelle Pryor MD       ROS:  Admits hand pain has improved   Admits knee pain is about the same.    Admits swelling in hands has improved. Admits fullness in left ear x1-2 weeks. Admits previously had pain in left ear but is now gone. Denies ear drainage. Denies fever. Past Surgical History:   Procedure Laterality Date    ABDOMEN SURGERY  10/12/2018    pelvic mass removed     COLONOSCOPY  08/13/2018    -polyp(benign lymphoid)hemorrhoids    COLONOSCOPY N/A 8/13/2018    COLONOSCOPY POLYPECTOMY SNARE/COLD BIOPSY performed by Madelaine Ramos MD at [address]  2008    Dr. Ai Rachel  2009    HYSTERECTOMY  2009    supracervical, BSO with cervical conization, Dr. Dimas Leary LITHOTRIPSY Left     Fredia Baljinder ESWL Left 12/4/2018    ESWL [address] LITHOTRIPSY performed by Jose Sim MD at [facility] 99 History   Problem Relation Age of Onset    High Blood Pressure Father     Heart Disease Father     Stroke Father     Dementia Mother     Other Mother         thoracic AAA    Cancer Mother         bilateral kidney       Past Medical History:   Diagnosis Date    Allergic rhinitis 1992    DDD (degenerative disc disease), cervical 2013    H/O echocardiogram 8/11/15    EF 65%. Aortic valve sclerosis without stenosis. Mild aortic regurgitation. Myxomatous thickening of the mitral leaflets with Moderate mitral regurgitation. Mils (grade l) diastolic dysfunction.  H/O echocardiogram 09/02/2016    EF >60%. Normal LV wall thickness and cavity size. No definite specific wall motion abnormalities were identified. Left atrium is mildly dilated (29-33) left atrial volume index of 31 ml/m2. Mild aortic stenosis. Myxomatous thickening of the mitral leaflets with moderate eccentric mitral regurg. Mild treicuspid regurg. Mild diastolic dysfunction is seen.  H/O echocardiogram 08/06/2018    EF >55%. The LV wall thickness is mildly incrased. Moderate mitral regurg. Mild aortic stenosis.  Mild aortic regurg. Mild tricuspid regurg. Evidence of mild (grade I) diastolic dysfucntion is seen.  Hyperglycemia 2013    Hyperlipidemia     Hypertension     Mitral regurgitation 2010    MVP (mitral valve prolapse) 2010    Obesity 2010    Pulmonary hypertension ([address] 2013    Sleep apnea 2012    Uterine fibroid 2008    Venous insufficiency 2013    Wears glasses       Social History     Tobacco Use    Smoking status: Never Smoker    Smokeless tobacco: Never Used   Substance Use Topics    Alcohol use: No      Current Outpatient Medications   Medication Sig Dispense Refill    amoxicillin-clavulanate (AUGMENTIN) 875-125 MG per tablet Take 1 tablet by mouth 2 times daily for 10 days 20 tablet 0    meloxicam (MOBIC) 15 MG tablet Take 1 tablet by mouth daily 30 tablet 1    furosemide (LASIX) 40 MG tablet Take 1 tablet by mouth daily 90 tablet 3    atorvastatin (LIPITOR) 20 MG tablet TAKE ONE TABLET BY MOUTH DAILY 90 tablet 3    mometasone (ELOCON) 0.1 % cream Apply topically daily. 15 g 0    aspirin 81 MG tablet Take 81 mg by mouth daily      calcium carbonate (OSCAL) 500 MG TABS tablet Take 500 mg by mouth daily      Multiple Vitamins-Minerals (ALIVE ONCE DAILY WOMENS 50+ PO) Take by mouth      Ascorbic Acid (VITAMIN C) 250 MG tablet Take 250 mg by mouth daily       No current facility-administered medications for this visit. No Known Allergies    PHYSICAL EXAM:    /84   Ht 5' 4\" (1.626 m)   Wt 201 lb 9.6 oz (91.4 kg)   LMP  (LMP Unknown)   BMI 34.60 kg/m²   Wt Readings from Last 3 Encounters:   01/10/20 201 lb 9.6 oz (91.4 kg)   12/06/19 202 lb (91.6 kg)   09/17/19 202 lb 12.8 oz (92 kg)     BP Readings from Last 3 Encounters:   01/10/20 136/84   12/06/19 134/80   09/17/19 138/71       General Appearance: in no acute distress, well developed, well nourished. Eyes: pupils equal, round reactive to light and accommodation.  Wearing glasses  Ears: R TM retracted, L TM erythematous, retracted, dull.   Neck/Thyroid: neck supple, full range of motion  Skin: warm and dry. No suspicious lesions. Musculoskeletal: normal, full range of motion in knees and hips, no swelling or tenderness. Extremities: no cyanosis or edema. Peripheral Pulses: 2+ throughout, symetric. Neurologic: nonfocal, motor strength normal upper and lower extremities, sensory exam intact. Psych: normal affect, speech fluent. ASSESSMENT:   Diagnosis Orders   1. Hyperlipidemia, unspecified hyperlipidemia type  ALT    AST    Basic Metabolic Panel    CBC   2. Left otitis media, unspecified otitis media type     3. Osteoarthritis, unspecified osteoarthritis type, unspecified site      diffuse     4. Osteoarthritis of knee, unspecified laterality, unspecified osteoarthritis type         PLAN:  Her labs are reviewed. We ruled out rheumatoid arthritis but she did have an inflammatory process going on. I would like to check her kidney function to make sure the meloxicam is affecting this. It doesn't appear to be affecting her blood pressure either. I encourage her to work on strengthening the legs through doing terminal legs extensions. I show her how she can do this. This will help strengthen her quads to help stabilize the legs. I think that she has a little infection in the left ear. I will treat this with augmentin 875 mg twice daily x10 days. The patient is instructed to call the office if she is not improving in a couple days. I will see her back in 3-4 months.    Orders Placed This Encounter   Procedures    ALT     Standing Status:   Future     Number of Occurrences:   1     Standing Expiration Date:   1/9/2021    AST     Standing Status:   Future     Number of Occurrences:   1     Standing Expiration Date:   1/9/2021    Basic Metabolic Panel     Standing Status:   Future     Number of Occurrences:   1     Standing Expiration Date:   1/9/2021    CBC     Standing Status:   Future     Number of Occurrences:   1     Standing

## 2020-01-28 RX ORDER — MELOXICAM 15 MG/1
TABLET ORAL
Qty: 30 TABLET | Refills: 1 | Status: SHIPPED | OUTPATIENT
Start: 2020-01-28 | End: 2020-03-25

## 2020-01-31 ENCOUNTER — OFFICE VISIT (OUTPATIENT)
Dept: FAMILY MEDICINE CLINIC | Age: 65
End: 2020-01-31
Payer: COMMERCIAL

## 2020-01-31 VITALS
WEIGHT: 200 LBS | HEIGHT: 64 IN | BODY MASS INDEX: 34.15 KG/M2 | DIASTOLIC BLOOD PRESSURE: 76 MMHG | SYSTOLIC BLOOD PRESSURE: 138 MMHG

## 2020-01-31 PROCEDURE — G8417 CALC BMI ABV UP PARAM F/U: HCPCS | Performed by: FAMILY MEDICINE

## 2020-01-31 PROCEDURE — 3017F COLORECTAL CA SCREEN DOC REV: CPT | Performed by: FAMILY MEDICINE

## 2020-01-31 PROCEDURE — G8427 DOCREV CUR MEDS BY ELIG CLIN: HCPCS | Performed by: FAMILY MEDICINE

## 2020-01-31 PROCEDURE — 1036F TOBACCO NON-USER: CPT | Performed by: FAMILY MEDICINE

## 2020-01-31 PROCEDURE — G8399 PT W/DXA RESULTS DOCUMENT: HCPCS | Performed by: FAMILY MEDICINE

## 2020-01-31 PROCEDURE — 4040F PNEUMOC VAC/ADMIN/RCVD: CPT | Performed by: FAMILY MEDICINE

## 2020-01-31 PROCEDURE — 1123F ACP DISCUSS/DSCN MKR DOCD: CPT | Performed by: FAMILY MEDICINE

## 2020-01-31 PROCEDURE — 99213 OFFICE O/P EST LOW 20 MIN: CPT | Performed by: FAMILY MEDICINE

## 2020-01-31 PROCEDURE — G8484 FLU IMMUNIZE NO ADMIN: HCPCS | Performed by: FAMILY MEDICINE

## 2020-01-31 PROCEDURE — 1090F PRES/ABSN URINE INCON ASSESS: CPT | Performed by: FAMILY MEDICINE

## 2020-01-31 RX ORDER — SUCRALFATE ORAL 1 G/10ML
SUSPENSION ORAL
Qty: 120 ML | Refills: 0 | Status: SHIPPED | OUTPATIENT
Start: 2020-01-31 | End: 2020-02-07 | Stop reason: SDUPTHER

## 2020-01-31 NOTE — PROGRESS NOTES
Kedar Fam [facility], am scribing for and in the presence of Dr. David Castro. 1/31/20/3:00 pm/JML      [address] [address]  [phone]  Dept: [phone]    HPI:  Right ear pain x 1 week, worse x 1 day (was treated for left ear infection earlier this month)    Canker sores in her mouth, moving around, lasting 1-2 weeks each for the past month  She has tried changing her toothpaste and using mouth washes without any improvement      Current Outpatient Medications   Medication Sig Dispense Refill    sucralfate (CARAFATE) 1 GM/10ML suspension Mix 120 ml of Sucralfate with 10 ml Dexamethasone and take 1 tsp swish and spit  mL 0    dexamethasone (DEXAMETHASONE INTENSOL) 1 MG/ML solution Take 1 mL by mouth 4 times daily Mix 120 ml of Sucralfate with 10 ml Dexamethasone and take 1 tsp swish and spit QID 10 mL 0    meloxicam (MOBIC) 15 MG tablet TAKE 1 TABLET BY MOUTH EVERY DAY 30 tablet 1    furosemide (LASIX) 40 MG tablet Take 1 tablet by mouth daily 90 tablet 3    atorvastatin (LIPITOR) 20 MG tablet TAKE ONE TABLET BY MOUTH DAILY 90 tablet 3    mometasone (ELOCON) 0.1 % cream Apply topically daily. 15 g 0    aspirin 81 MG tablet Take 81 mg by mouth daily      calcium carbonate (OSCAL) 500 MG TABS tablet Take 500 mg by mouth daily      Multiple Vitamins-Minerals (ALIVE ONCE DAILY WOMENS 50+ PO) Take by mouth      Ascorbic Acid (VITAMIN C) 250 MG tablet Take 250 mg by mouth daily       No current facility-administered medications for this visit.         ROS:  Admits ear pain, right  Admits headache  Admits mouth sores    EXAM:  /76   Ht 5' 4\" (1.626 m)   Wt 200 lb (90.7 kg)   LMP  (LMP Unknown)   BMI 34.33 kg/m²   Wt Readings from Last 3 Encounters:   01/31/20 200 lb (90.7 kg)   01/10/20 201 lb 9.6 oz (91.4 kg)   12/06/19 202 lb (91.6 kg)     BP Readings from Last 3 Encounters:   01/31/20 138/76   01/10/20 136/84   12/06/19 134/80

## 2020-02-07 ENCOUNTER — TELEPHONE (OUTPATIENT)
Dept: FAMILY MEDICINE CLINIC | Age: 65
End: 2020-02-07

## 2020-02-07 RX ORDER — SUCRALFATE ORAL 1 G/10ML
SUSPENSION ORAL
Qty: 120 ML | Refills: 0 | Status: SHIPPED | OUTPATIENT
Start: 2020-02-07 | End: 2020-04-06 | Stop reason: ALTCHOICE

## 2020-02-28 ENCOUNTER — HOSPITAL ENCOUNTER (OUTPATIENT)
Age: 65
Discharge: HOME | End: 2020-03-01
Payer: COMMERCIAL

## 2020-02-28 ENCOUNTER — HOSPITAL ENCOUNTER (OUTPATIENT)
Dept: GENERAL RADIOLOGY | Age: 65
Discharge: HOME | End: 2020-03-01
Payer: COMMERCIAL

## 2020-02-28 PROCEDURE — 74018 RADEX ABDOMEN 1 VIEW: CPT

## 2020-02-28 NOTE — PROGRESS NOTES
HPI:    Patient is a 71 y.o. female in no acute distress. She is alert and oriented to person, place, and time. History  8/6/2018 Referral from Dr. Luzma Cook for gross hematuria. She first noticed the hematuria in the spring and has had multiple episodes since. This has not been associated with lower urinary tract symptoms, but she does experience suprapubic \"cramping\". She was never a smoker. She does work for [employer]. She denies history of kidney stones or frequent urinary tract infection. She did have a hysterectomy in 2009, but does have her ovaries.       8/2018 CT showed punctate nonobstructing stones in the right, and a 5 mm nonobstructing stone in the left kidney but there is no hydronephrosis. There is no no masses or filling defects in the upper collecting system, ureters, or bladder to suggest malignancy. The radiologist as mentioned a multicystic residual right ovary and a mass lesion of the vaginal cuff. She did see gynecology for this and they are completing a work-up for her GYN abnormalities. Cystoscopy was negative.     10/2018 excision of pelvic mass     12/2018 left ESWL     Currently  Patient is here today for annual follow-up. Patient is doing well. Patient did get a recent KUB. This film was independently reviewed today. This did not show any renal calculus. Radiology does make the comment that she does have copious amount of retained stool in transverse colon making the kidneys hard to visualize. Patient has been doing very well. No recent gross hematuria dysuria. She reports no flank pain nausea vomiting. Past Medical History:   Diagnosis Date    Allergic rhinitis 1992    DDD (degenerative disc disease), cervical 2013    H/O echocardiogram 8/11/15    EF 65%. Aortic valve sclerosis without stenosis. Mild aortic regurgitation. Myxomatous thickening of the mitral leaflets with Moderate mitral regurgitation. Mils (grade l) diastolic dysfunction.     H/O swish and spit QID 10 mL 0    meloxicam (MOBIC) 15 MG tablet TAKE 1 TABLET BY MOUTH EVERY DAY 30 tablet 1    furosemide (LASIX) 40 MG tablet Take 1 tablet by mouth daily 90 tablet 3    atorvastatin (LIPITOR) 20 MG tablet TAKE ONE TABLET BY MOUTH DAILY 90 tablet 3    mometasone (ELOCON) 0.1 % cream Apply topically daily. 15 g 0    aspirin 81 MG tablet Take 81 mg by mouth daily      [DISCONTINUED] lovastatin (MEVACOR) 20 MG tablet Take 1 tablet by mouth daily 90 tablet 3    calcium carbonate (OSCAL) 500 MG TABS tablet Take 500 mg by mouth daily      Multiple Vitamins-Minerals (ALIVE ONCE DAILY WOMENS 50+ PO) Take by mouth      Ascorbic Acid (VITAMIN C) 250 MG tablet Take 250 mg by mouth daily       No facility-administered encounter medications on file as of 3/2/2020. Current Outpatient Medications on File Prior to Visit   Medication Sig Dispense Refill    sucralfate (CARAFATE) 1 GM/10ML suspension Mix 120 ml of Sucralfate with 10 ml Dexamethasone and take 1 tsp swish and spit  mL 0    dexamethasone (DEXAMETHASONE INTENSOL) 1 MG/ML solution Take 1 mL by mouth 4 times daily Mix 120 ml of Sucralfate with 10 ml Dexamethasone and take 1 tsp swish and spit QID 10 mL 0    meloxicam (MOBIC) 15 MG tablet TAKE 1 TABLET BY MOUTH EVERY DAY 30 tablet 1    furosemide (LASIX) 40 MG tablet Take 1 tablet by mouth daily 90 tablet 3    atorvastatin (LIPITOR) 20 MG tablet TAKE ONE TABLET BY MOUTH DAILY 90 tablet 3    mometasone (ELOCON) 0.1 % cream Apply topically daily.  15 g 0    aspirin 81 MG tablet Take 81 mg by mouth daily      [DISCONTINUED] lovastatin (MEVACOR) 20 MG tablet Take 1 tablet by mouth daily 90 tablet 3    calcium carbonate (OSCAL) 500 MG TABS tablet Take 500 mg by mouth daily      Multiple Vitamins-Minerals (ALIVE ONCE DAILY WOMENS 50+ PO) Take by mouth      Ascorbic Acid (VITAMIN C) 250 MG tablet Take 250 mg by mouth daily       No current facility-administered medications on file prior to visit. Patient has no known allergies. Family History   Problem Relation Age of Onset    High Blood Pressure Father     Heart Disease Father     Stroke Father     Dementia Mother     Other Mother         thoracic AAA    Cancer Mother         bilateral kidney     Social History     Tobacco Use   Smoking Status Never Smoker   Smokeless Tobacco Never Used       Social History     Substance and Sexual Activity   Alcohol Use No       Review of Systems    LMP  (LMP Unknown)       PHYSICAL EXAM:  Constitutional: Patient resting comfortably, in no acute distress. Neuro: Alert and oriented to person place and time. Cranial nerves grossly intact. Psych: Mood and affect normal.  Skin: Warm, dry  HEENT: normocephalic, atraumatic  Lymphatics: No palpable lymphadenopathy  Lungs: Respiratory effort normal, unlabored  Cardiovascular:  Normal peripheral pulses  Abdomen: Soft, non-tender, non-distended with no organomegaly or palpable masses. : No CVA tenderness. Bladder non-tender and not distended. Pelvic: Deferred    Lab Results   Component Value Date    BUN 16 01/10/2020     Lab Results   Component Value Date    CREATININE 0.71 01/10/2020       ASSESSMENT:  This is a 71 y.o. female with the following diagnoses:   Diagnosis Orders   1. Kidney stone  XR ABDOMEN (KUB) (SINGLE AP VIEW)         PLAN:  Patient will follow-up with us in 1 year with a repeat KUB. We did go over stone prevention from dietary standpoint today.

## 2020-03-02 ENCOUNTER — OFFICE VISIT (OUTPATIENT)
Dept: UROLOGY | Age: 65
End: 2020-03-02
Payer: COMMERCIAL

## 2020-03-02 VITALS
DIASTOLIC BLOOD PRESSURE: 84 MMHG | WEIGHT: 205 LBS | SYSTOLIC BLOOD PRESSURE: 148 MMHG | HEIGHT: 64 IN | BODY MASS INDEX: 35 KG/M2

## 2020-03-02 PROCEDURE — 4040F PNEUMOC VAC/ADMIN/RCVD: CPT | Performed by: UROLOGY

## 2020-03-02 PROCEDURE — G8484 FLU IMMUNIZE NO ADMIN: HCPCS | Performed by: UROLOGY

## 2020-03-02 PROCEDURE — 1036F TOBACCO NON-USER: CPT | Performed by: UROLOGY

## 2020-03-02 PROCEDURE — G8427 DOCREV CUR MEDS BY ELIG CLIN: HCPCS | Performed by: UROLOGY

## 2020-03-02 PROCEDURE — 1123F ACP DISCUSS/DSCN MKR DOCD: CPT | Performed by: UROLOGY

## 2020-03-02 PROCEDURE — 99213 OFFICE O/P EST LOW 20 MIN: CPT | Performed by: UROLOGY

## 2020-03-02 PROCEDURE — G8399 PT W/DXA RESULTS DOCUMENT: HCPCS | Performed by: UROLOGY

## 2020-03-02 PROCEDURE — 1090F PRES/ABSN URINE INCON ASSESS: CPT | Performed by: UROLOGY

## 2020-03-02 PROCEDURE — G8417 CALC BMI ABV UP PARAM F/U: HCPCS | Performed by: UROLOGY

## 2020-03-02 PROCEDURE — 3017F COLORECTAL CA SCREEN DOC REV: CPT | Performed by: UROLOGY

## 2020-03-02 NOTE — PATIENT INSTRUCTIONS
SURVEY:    You may be receiving a survey from [employer] regarding your visit today. Please complete the survey to enable us to provide the highest quality of care to you and your family. If you cannot score us a very good on any question, please call the office to discuss how we could have made your experience a very good one. Thank you.

## 2020-03-25 RX ORDER — MELOXICAM 15 MG/1
TABLET ORAL
Qty: 30 TABLET | Refills: 1 | Status: SHIPPED | OUTPATIENT
Start: 2020-03-25 | End: 2020-05-18

## 2020-04-06 ENCOUNTER — VIRTUAL VISIT (OUTPATIENT)
Dept: FAMILY MEDICINE CLINIC | Age: 65
End: 2020-04-06
Payer: COMMERCIAL

## 2020-04-06 VITALS — HEIGHT: 64 IN | BODY MASS INDEX: 35.19 KG/M2

## 2020-04-06 PROBLEM — K12.0 APHTHOUS ULCER: Status: ACTIVE | Noted: 2020-04-06

## 2020-04-06 PROBLEM — F43.21 ADJUSTMENT DISORDER WITH DEPRESSED MOOD: Status: ACTIVE | Noted: 2020-04-06

## 2020-04-06 PROCEDURE — 99442 PR PHYS/QHP TELEPHONE EVALUATION 11-20 MIN: CPT | Performed by: FAMILY MEDICINE

## 2020-04-06 ASSESSMENT — PATIENT HEALTH QUESTIONNAIRE - PHQ9
2. FEELING DOWN, DEPRESSED OR HOPELESS: 0
SUM OF ALL RESPONSES TO PHQ QUESTIONS 1-9: 1
1. LITTLE INTEREST OR PLEASURE IN DOING THINGS: 0
SUM OF ALL RESPONSES TO PHQ9 QUESTIONS 1 & 2: 1
2. FEELING DOWN, DEPRESSED OR HOPELESS: 1
SUM OF ALL RESPONSES TO PHQ9 QUESTIONS 1 & 2: 0
SUM OF ALL RESPONSES TO PHQ QUESTIONS 1-9: 1
SUM OF ALL RESPONSES TO PHQ QUESTIONS 1-9: 0
SUM OF ALL RESPONSES TO PHQ QUESTIONS 1-9: 0
1. LITTLE INTEREST OR PLEASURE IN DOING THINGS: 0

## 2020-04-06 NOTE — PROGRESS NOTES
Dr. Bry Carbajal, personally performed the services described in this documentation as scribed by TIERRA Bailey in my presence, and is both accurate and complete.

## 2020-04-23 RX ORDER — SUCRALFATE ORAL 1 G/10ML
SUSPENSION ORAL
Qty: 120 ML | Refills: 0 | Status: SHIPPED | OUTPATIENT
Start: 2020-04-23 | End: 2021-04-09 | Stop reason: ALTCHOICE

## 2020-04-24 RX ORDER — DEXAMETHASONE INTENSOL 1 MG/ML
SOLUTION, CONCENTRATE ORAL
Qty: 10 ML | Refills: 0 | Status: SHIPPED | OUTPATIENT
Start: 2020-04-24 | End: 2021-04-09 | Stop reason: ALTCHOICE

## 2020-05-18 RX ORDER — MELOXICAM 15 MG/1
TABLET ORAL
Qty: 30 TABLET | Refills: 1 | Status: SHIPPED | OUTPATIENT
Start: 2020-05-18 | End: 2020-07-17

## 2020-07-17 RX ORDER — MELOXICAM 15 MG/1
TABLET ORAL
Qty: 90 TABLET | Refills: 0 | Status: SHIPPED | OUTPATIENT
Start: 2020-07-17 | End: 2020-10-12

## 2020-09-17 RX ORDER — ATORVASTATIN CALCIUM 20 MG/1
TABLET, FILM COATED ORAL
Qty: 90 TABLET | Refills: 3 | Status: SHIPPED | OUTPATIENT
Start: 2020-09-17 | End: 2021-09-13

## 2020-09-25 RX ORDER — FUROSEMIDE 40 MG/1
TABLET ORAL
Qty: 90 TABLET | Refills: 3 | Status: SHIPPED | OUTPATIENT
Start: 2020-09-25 | End: 2021-09-13

## 2020-09-29 ENCOUNTER — HOSPITAL ENCOUNTER (OUTPATIENT)
Age: 65
Discharge: HOME | End: 2020-09-29
Payer: COMMERCIAL

## 2020-09-29 LAB
ALT SERPL-CCNC: 24 U/L (ref 5–33)
ANION GAP SERPL CALCULATED.3IONS-SCNC: 8 MMOL/L (ref 9–17)
AST SERPL-CCNC: 18 U/L
BUN BLDV-MCNC: 15 MG/DL (ref 8–23)
BUN/CREAT BLD: 20 (ref 9–20)
CALCIUM SERPL-MCNC: 9.2 MG/DL (ref 8.6–10.4)
CHLORIDE BLD-SCNC: 109 MMOL/L (ref 98–107)
CHOLESTEROL/HDL RATIO: 2.3
CHOLESTEROL: 109 MG/DL
CO2: 29 MMOL/L (ref 20–31)
CREAT SERPL-MCNC: 0.75 MG/DL (ref 0.5–0.9)
GFR AFRICAN AMERICAN: >60 ML/MIN
GFR NON-AFRICAN AMERICAN: >60 ML/MIN
GFR SERPL CREATININE-BSD FRML MDRD: ABNORMAL ML/MIN/{1.73_M2}
GFR SERPL CREATININE-BSD FRML MDRD: ABNORMAL ML/MIN/{1.73_M2}
GLUCOSE BLD-MCNC: 101 MG/DL (ref 70–99)
HCT VFR BLD CALC: 40.8 % (ref 36.3–47.1)
HDLC SERPL-MCNC: 48 MG/DL
HEMOGLOBIN: 12.7 G/DL (ref 11.9–15.1)
HIGH SENSITIVE C-REACTIVE PROTEIN: 4.5 MG/L
LDL CHOLESTEROL: 44 MG/DL (ref 0–130)
MCH RBC QN AUTO: 28.2 PG (ref 25.2–33.5)
MCHC RBC AUTO-ENTMCNC: 31.1 G/DL (ref 28.4–34.8)
MCV RBC AUTO: 90.7 FL (ref 82.6–102.9)
NRBC AUTOMATED: 0 PER 100 WBC
PDW BLD-RTO: 12.8 % (ref 11.8–14.4)
PLATELET # BLD: 197 K/UL (ref 138–453)
PMV BLD AUTO: 9.3 FL (ref 8.1–13.5)
POTASSIUM SERPL-SCNC: 5 MMOL/L (ref 3.7–5.3)
RBC # BLD: 4.5 M/UL (ref 3.95–5.11)
SEDIMENTATION RATE, ERYTHROCYTE: 22 MM (ref 0–20)
SODIUM BLD-SCNC: 146 MMOL/L (ref 135–144)
TRIGL SERPL-MCNC: 87 MG/DL
VITAMIN D 25-HYDROXY: 31.2 NG/ML (ref 30–100)
VLDLC SERPL CALC-MCNC: NORMAL MG/DL (ref 1–30)
WBC # BLD: 7 K/UL (ref 3.5–11.3)

## 2020-09-29 PROCEDURE — 80048 BASIC METABOLIC PNL TOTAL CA: CPT

## 2020-09-29 PROCEDURE — 83036 HEMOGLOBIN GLYCOSYLATED A1C: CPT

## 2020-09-29 PROCEDURE — 84450 TRANSFERASE (AST) (SGOT): CPT

## 2020-09-29 PROCEDURE — 36415 COLL VENOUS BLD VENIPUNCTURE: CPT

## 2020-09-29 PROCEDURE — 85651 RBC SED RATE NONAUTOMATED: CPT

## 2020-09-29 PROCEDURE — 80061 LIPID PANEL: CPT

## 2020-09-29 PROCEDURE — 84460 ALANINE AMINO (ALT) (SGPT): CPT

## 2020-09-29 PROCEDURE — 86141 C-REACTIVE PROTEIN HS: CPT

## 2020-09-29 PROCEDURE — 85027 COMPLETE CBC AUTOMATED: CPT

## 2020-09-29 PROCEDURE — 82306 VITAMIN D 25 HYDROXY: CPT

## 2020-09-30 LAB
ESTIMATED AVERAGE GLUCOSE: 126 MG/DL
HBA1C MFR BLD: 6 % (ref 4–6)

## 2020-10-12 ENCOUNTER — OFFICE VISIT (OUTPATIENT)
Dept: FAMILY MEDICINE CLINIC | Age: 65
End: 2020-10-12
Payer: COMMERCIAL

## 2020-10-12 VITALS
SYSTOLIC BLOOD PRESSURE: 152 MMHG | HEIGHT: 64 IN | DIASTOLIC BLOOD PRESSURE: 82 MMHG | BODY MASS INDEX: 33.97 KG/M2 | WEIGHT: 199 LBS

## 2020-10-12 PROCEDURE — G8427 DOCREV CUR MEDS BY ELIG CLIN: HCPCS | Performed by: FAMILY MEDICINE

## 2020-10-12 PROCEDURE — 4040F PNEUMOC VAC/ADMIN/RCVD: CPT | Performed by: FAMILY MEDICINE

## 2020-10-12 PROCEDURE — 1036F TOBACCO NON-USER: CPT | Performed by: FAMILY MEDICINE

## 2020-10-12 PROCEDURE — G8484 FLU IMMUNIZE NO ADMIN: HCPCS | Performed by: FAMILY MEDICINE

## 2020-10-12 PROCEDURE — 1090F PRES/ABSN URINE INCON ASSESS: CPT | Performed by: FAMILY MEDICINE

## 2020-10-12 PROCEDURE — 90694 VACC AIIV4 NO PRSRV 0.5ML IM: CPT | Performed by: FAMILY MEDICINE

## 2020-10-12 PROCEDURE — 99214 OFFICE O/P EST MOD 30 MIN: CPT | Performed by: FAMILY MEDICINE

## 2020-10-12 PROCEDURE — 90471 IMMUNIZATION ADMIN: CPT | Performed by: FAMILY MEDICINE

## 2020-10-12 PROCEDURE — G8417 CALC BMI ABV UP PARAM F/U: HCPCS | Performed by: FAMILY MEDICINE

## 2020-10-12 PROCEDURE — 1123F ACP DISCUSS/DSCN MKR DOCD: CPT | Performed by: FAMILY MEDICINE

## 2020-10-12 PROCEDURE — G8399 PT W/DXA RESULTS DOCUMENT: HCPCS | Performed by: FAMILY MEDICINE

## 2020-10-12 PROCEDURE — 3017F COLORECTAL CA SCREEN DOC REV: CPT | Performed by: FAMILY MEDICINE

## 2020-10-12 RX ORDER — COLCHICINE 0.6 MG/1
0.6 CAPSULE ORAL DAILY
Qty: 180 CAPSULE | Refills: 3 | Status: SHIPPED | OUTPATIENT
Start: 2020-10-12 | End: 2021-04-07 | Stop reason: CLARIF

## 2020-10-12 RX ORDER — MELOXICAM 15 MG/1
TABLET ORAL
Qty: 90 TABLET | Refills: 0 | Status: SHIPPED | OUTPATIENT
Start: 2020-10-12 | End: 2021-01-04

## 2020-10-12 NOTE — PROGRESS NOTES
I, TIERRA Holloway, am scribing for and in the presence of Dr. Jeremie Leal. 10/12/2020 4:16 pm Oh Toscano  [facility] [address]  [place] [phone]  Dept: [phone]    Chika Wakefield is a 71 y.o. female here for 6 Month Follow-Up; Hypertension; Hyperlipidemia; and Hyperglycemia      HPI:  HYPERTENSION:  She is exercising (walking). She is adherent to a low-salt diet.    Blood pressure is not being monitored at home.     HYPERGLYCEMIA:  Diet compliance: compliant most of the time  Current exercise: walking     HYPERLIPIDEMIA:     Medication compliance: compliant all of the time. Patient is  following a low fat, low cholesterol diet.    She is exercising regularly (walking). Prior to Admission medications    Medication Sig Start Date End Date Taking? Authorizing Provider   meloxicam (MOBIC) 15 MG tablet TAKE 1 TABLET BY MOUTH EVERY DAY  Patient taking differently: Take 15 mg by mouth every other day  10/12/20  Yes Jeremie Leal MD   diclofenac sodium (VOLTAREN) 1 % GEL Apply 2 g topically 2 times daily 10/12/20  Yes Jeremie Leal MD   colchicine (MITIGARE) 0.6 MG capsule Take 1 capsule by mouth daily x1 month then increase to 0.6 mg bid.  10/12/20  Yes Jeremie Leal MD   furosemide (LASIX) 40 MG tablet TAKE 1 TABLET BY MOUTH EVERY DAY 9/25/20  Yes Jeremie Leal MD   atorvastatin (LIPITOR) 20 MG tablet TAKE 1 TABLET BY MOUTH EVERY DAY 9/17/20  Yes Jeremie Leal MD   aspirin 81 MG tablet Take 81 mg by mouth daily   Yes Historical Provider, MD   calcium carbonate (OSCAL) 500 MG TABS tablet Take 500 mg by mouth daily   Yes Historical Provider, MD   Multiple Vitamins-Minerals (ALIVE ONCE DAILY WOMENS 50+ PO) Take by mouth   Yes Historical Provider, MD   Ascorbic Acid (VITAMIN C) 250 MG tablet Take 250 mg by mouth daily   Yes Historical Provider, MD   DEXAMETHASONE INTENSOL 1 MG/ML solution TAKE 1 ML BY MOUTH 4 TIMES DAILY  ML OF SUCRALFATE WITH 10 ML DEXAMETHASONE AND TAKE 1 TSP SWISH AND SPIT FOUR TIMES A DAY  Patient not taking: Reported on 10/12/2020 4/24/20   Francesca Ron MD   sucralfate (CARAFATE) 1 GM/10ML suspension  ML OF SUCRALFATE WITH 10 ML DEXAMETHASONE AND TAKE 1 TSP SWISH AND SPIT FOUR TIMES A DAY  Patient not taking: Reported on 10/12/2020 4/23/20   Francesca Ron MD   mometasone (ELOCON) 0.1 % cream Apply topically daily. Patient not taking: Reported on 4/6/2020 6/12/19   Francesca Ron MD   lovastatin (MEVACOR) 20 MG tablet Take 1 tablet by mouth daily 4/5/16 8/23/16  Francesca Ron MD       ROS:  General Constitutional: Denies chills. Denies fever. Denies headache. Denies lightheadedness. Ophthalmologic: Denies blurred vision. ENT: Denies nasal congestion. Denies sore throat. Denies ear pain and pressure. Admits canker sores x 3 right now. Admits fullness in bilateral ears x2 days. Respiratory: Denies cough. Denies shortness of breath. Denies wheezing. Cardiovascular: Denies chest pain at rest. Denies irregular heartbeat. Denies palpitations. Gastrointestinal: Denies abdominal pain. Denies blood in the stool. Denies constipation. Denies diarrhea. Denies nausea. Denies vomiting. Genitourinary: Denies blood in the urine. Denies difficulty urinating. Denies frequent urination. Denies painful urination. Denies urinary incontinence. Musculoskeletal: Denies muscle aches. Admits painful joints, right knee pain, aching, locking. Denies swollen joints. Peripheral Vascular: Denies pain/cramping in legs after exertion. Skin: Denies dry skin. Denies itching. Denies rash. Neurologic: Denies falls. Denies dizziness. Denies fainting. Denies tingling/numbness. Psychiatric: Denies sleep disturbance. Denies anxiety. Denies depressed mood.     Past Surgical History:   Procedure Laterality Date    ABDOMEN SURGERY  10/12/2018    pelvic mass removed     COLONOSCOPY  08/13/2018    -polyp(benign lymphoid)hemorrhoids    COLONOSCOPY N/A 8/13/2018    COLONOSCOPY POLYPECTOMY SNARE/COLD BIOPSY performed by Shruthi Glynn MD at [address]  2008    Dr. Beatriz Sarmiento  2009    HYSTERECTOMY  2009    supracervical, BSO with cervical conization, Dr. Alex Dias LITHOTRIPSY Left     Kathee Jaron ESWL Left 12/4/2018    ESWL [address] LITHOTRIPSY performed by Farzana Plasencia MD at [facility] History   Problem Relation Age of Onset    High Blood Pressure Father     Heart Disease Father     Stroke Father     Dementia Mother     Other Mother         thoracic AAA    Cancer Mother         bilateral kidney       Past Medical History:   Diagnosis Date    Allergic rhinitis 1992    DDD (degenerative disc disease), cervical 2013    H/O echocardiogram 8/11/15    EF 65%. Aortic valve sclerosis without stenosis. Mild aortic regurgitation. Myxomatous thickening of the mitral leaflets with Moderate mitral regurgitation. Mils (grade l) diastolic dysfunction.  H/O echocardiogram 09/02/2016    EF >60%. Normal LV wall thickness and cavity size. No definite specific wall motion abnormalities were identified. Left atrium is mildly dilated (29-33) left atrial volume index of 31 ml/m2. Mild aortic stenosis. Myxomatous thickening of the mitral leaflets with moderate eccentric mitral regurg. Mild treicuspid regurg. Mild diastolic dysfunction is seen.  H/O echocardiogram 08/06/2018    EF >55%. The LV wall thickness is mildly incrased. Moderate mitral regurg. Mild aortic stenosis. Mild aortic regurg. Mild tricuspid regurg. Evidence of mild (grade I) diastolic dysfucntion is seen.      Hyperglycemia 2013    Hyperlipidemia     Hypertension     Mitral regurgitation 2010    MVP (mitral valve prolapse) 2010    Obesity 2010    Pulmonary hypertension ([address] 2013    Sleep apnea 2012    Uterine fibroid 2008    Venous insufficiency 2013    Wears glasses       Social History     Tobacco Use    Smoking status: Never Smoker    Smokeless tobacco: Never Used   Substance Use Topics    Alcohol use: No      Current Outpatient Medications   Medication Sig Dispense Refill    meloxicam (MOBIC) 15 MG tablet TAKE 1 TABLET BY MOUTH EVERY DAY (Patient taking differently: Take 15 mg by mouth every other day ) 90 tablet 0    diclofenac sodium (VOLTAREN) 1 % GEL Apply 2 g topically 2 times daily 1 Tube 3    colchicine (MITIGARE) 0.6 MG capsule Take 1 capsule by mouth daily x1 month then increase to 0.6 mg bid. 180 capsule 3    furosemide (LASIX) 40 MG tablet TAKE 1 TABLET BY MOUTH EVERY DAY 90 tablet 3    atorvastatin (LIPITOR) 20 MG tablet TAKE 1 TABLET BY MOUTH EVERY DAY 90 tablet 3    aspirin 81 MG tablet Take 81 mg by mouth daily      calcium carbonate (OSCAL) 500 MG TABS tablet Take 500 mg by mouth daily      Multiple Vitamins-Minerals (ALIVE ONCE DAILY WOMENS 50+ PO) Take by mouth      Ascorbic Acid (VITAMIN C) 250 MG tablet Take 250 mg by mouth daily      DEXAMETHASONE INTENSOL 1 MG/ML solution TAKE 1 ML BY MOUTH 4 TIMES DAILY  ML OF SUCRALFATE WITH 10 ML DEXAMETHASONE AND TAKE 1 TSP SWISH AND SPIT FOUR TIMES A DAY (Patient not taking: Reported on 10/12/2020) 10 mL 0    sucralfate (CARAFATE) 1 GM/10ML suspension  ML OF SUCRALFATE WITH 10 ML DEXAMETHASONE AND TAKE 1 TSP SWISH AND SPIT FOUR TIMES A DAY (Patient not taking: Reported on 10/12/2020) 120 mL 0    mometasone (ELOCON) 0.1 % cream Apply topically daily. (Patient not taking: Reported on 4/6/2020) 15 g 0     No current facility-administered medications for this visit.       Allergies   Allergen Reactions    Seasonal        PHYSICAL EXAM:    BP (!) 152/82   Ht 5' 4\" (1.626 m)   Wt 199 lb (90.3 kg)   LMP  (LMP Unknown)   BMI 34.16 kg/m²   Wt Readings from Last 3 Encounters:   10/12/20 199 lb (90.3 kg)   03/02/20 205 lb (93 kg)   01/31/20 200 lb (90.7 kg)     BP Readings from Last 3 Encounters:   10/12/20 (!) 152/82   03/02/20 (!) 148/84   01/31/20 138/76       General Appearance: in no acute distress, well developed, well nourished. Eyes: pupils equal, round reactive to light and accommodation. Wearing glasses  Ears: normal canal and TM's. Nose: nares patent, no lesions. Oral Cavity: mucosa moist. Ulcerations x3, tip of tongue, lower lip on vermilion, upper near tooth number 15 on oral vestibule. Throat: clear. Neck/Thyroid: neck supple, full range of motion, no cervical lymphadenopathy, no thyromegaly or carotid bruits. Skin: warm and dry. No suspicious lesions. Heart: regular rate and rhythm. S1, S2 normal, no gallops. Rate: 75 faint systolic murmor pulmonic area left upper sternal border. Lungs: inspiratory wheeze ZEHRA partially clears with cough. Abdomen: bowel sounds present, soft, nontender, nondistended, no masses or organomegaly. Obese, round. Musculoskeletal:  Flexion to 120 degrees, full extension mild patello femoral crepitus. No loose bodies or positive mcmurrays. Extremities: no cyanosis 1-2+ edema ankles preitbial.  Peripheral Pulses: 2+ throughout, symetric. Neurologic: nonfocal, motor strength normal upper and lower extremities, sensory exam intact. Psych: normal affect, speech fluent. ASSESSMENT:   Diagnosis Orders   1. Essential hypertension     2. Encounter for screening mammogram for breast cancer  DONALD DIGITAL SCREEN W OR WO CAD BILATERAL   3. Needs flu shot  INFLUENZA, QUADV, ADJUVANTED, 65 YRS =, IM, PF, PREFILL SYR, 0.5ML (FLUAD)   4. Postmenopausal state  DEXA BONE DENSITY 2 SITES   5. Osteopenia, unspecified location  DEXA BONE DENSITY 2 SITES   6. Recurrent aphthous stomatitis     7. Pulmonary hypertension ([address]     8. Hyperglycemia     9. Hyperlipidemia, unspecified hyperlipidemia type     10.  Class 1 obesity due to excess calories with serious comorbidity and body mass index (BMI) of 33.0 to 33.9 in adult         PLAN:  We discuss the canker sores and the possibility of behcet's syndrome and how this can cause ulcerations in the vaginal mucosa, also. I would like to give a trial of colchicine 0.6 mg once daily x2 weeks. As long as she doesn't have diarrhea, she can increase to twice daily    Her labs are reviewed. Her A1C is 6.0. This is in the pre-diabetic range but she has been in this range for years. I encourage her to exercise and stay physically active. We discuss her knee pain. Locking/catching suggests meniscal changes. I recommend that she try using Voltaren gel topically 4x daily. Her ECHO from 2 years ago is reviewed showing some mitral valve regurgitation. I don't think she needs to see cardiology for follow up as long as she is not symptomatic. I will order repeat mammogram and dexa scan. She will get a flu shot. I will see her back in 1 month to follow on on canker sores and in 6 months for wellness.        Orders Placed This Encounter   Procedures    DONALD DIGITAL SCREEN W OR WO CAD BILATERAL     Standing Status:   Future     Standing Expiration Date:   10/12/2021     Order Specific Question:   Reason for exam:     Answer:   screening for breast cancer    DEXA BONE DENSITY 2 SITES     Standing Status:   Future     Standing Expiration Date:   10/12/2021     Order Specific Question:   Reason for exam:     Answer:   osteopenia, postemenopausal state    INFLUENZA, QUADV, ADJUVANTED, 72 YRS =, IM, PF, PREFILL SYR, 0.5ML (FLUAD)    CBC     Standing Status:   Future     Standing Expiration Date:   10/12/2021    Basic Metabolic Panel     Standing Status:   Future     Standing Expiration Date:   10/12/2021    ALT     Standing Status:   Future     Standing Expiration Date:   10/12/2021    AST     Standing Status:   Future     Standing Expiration Date:   10/12/2021    Hemoglobin A1C     Standing Status:   Future     Standing Expiration Date:   10/12/2021    C-Reactive Protein High Sensitivity     Standing Status:   Future Standing Expiration Date:   10/12/2021     Orders Placed This Encounter   Medications    diclofenac sodium (VOLTAREN) 1 % GEL     Sig: Apply 2 g topically 2 times daily     Dispense:  1 Tube     Refill:  3    colchicine (MITIGARE) 0.6 MG capsule     Sig: Take 1 capsule by mouth daily x1 month then increase to 0.6 mg bid. Dispense:  180 capsule     Refill:  3     I, Dr. Summer Alexander, personally performed the services described in this documentation as scribed by TIERRA Besaley in my presence, and is both accurate and complete.

## 2020-11-16 ENCOUNTER — OFFICE VISIT (OUTPATIENT)
Dept: FAMILY MEDICINE CLINIC | Age: 65
End: 2020-11-16
Payer: COMMERCIAL

## 2020-11-16 ENCOUNTER — HOSPITAL ENCOUNTER (OUTPATIENT)
Age: 65
Discharge: HOME | End: 2020-11-16
Payer: COMMERCIAL

## 2020-11-16 VITALS
WEIGHT: 200 LBS | HEART RATE: 65 BPM | BODY MASS INDEX: 34.15 KG/M2 | DIASTOLIC BLOOD PRESSURE: 70 MMHG | SYSTOLIC BLOOD PRESSURE: 120 MMHG | HEIGHT: 64 IN

## 2020-11-16 LAB
ABSOLUTE EOS #: 0.19 K/UL (ref 0–0.44)
ABSOLUTE IMMATURE GRANULOCYTE: <0.03 K/UL (ref 0–0.3)
ABSOLUTE LYMPH #: 3.3 K/UL (ref 1.1–3.7)
ABSOLUTE MONO #: 0.77 K/UL (ref 0.1–1.2)
ALT SERPL-CCNC: 34 U/L (ref 5–33)
ANION GAP SERPL CALCULATED.3IONS-SCNC: 11 MMOL/L (ref 9–17)
AST SERPL-CCNC: 26 U/L
BASOPHILS # BLD: 0 % (ref 0–2)
BASOPHILS ABSOLUTE: 0.03 K/UL (ref 0–0.2)
BUN BLDV-MCNC: 15 MG/DL (ref 8–23)
BUN/CREAT BLD: 18 (ref 9–20)
CALCIUM SERPL-MCNC: 9.5 MG/DL (ref 8.6–10.4)
CHLORIDE BLD-SCNC: 103 MMOL/L (ref 98–107)
CO2: 31 MMOL/L (ref 20–31)
CREAT SERPL-MCNC: 0.83 MG/DL (ref 0.5–0.9)
DIFFERENTIAL TYPE: ABNORMAL
EOSINOPHILS RELATIVE PERCENT: 3 % (ref 1–4)
GFR AFRICAN AMERICAN: >60 ML/MIN
GFR NON-AFRICAN AMERICAN: >60 ML/MIN
GFR SERPL CREATININE-BSD FRML MDRD: ABNORMAL ML/MIN/{1.73_M2}
GFR SERPL CREATININE-BSD FRML MDRD: ABNORMAL ML/MIN/{1.73_M2}
GLUCOSE BLD-MCNC: 93 MG/DL (ref 70–99)
HCT VFR BLD CALC: 44.5 % (ref 36.3–47.1)
HEMOGLOBIN: 13.9 G/DL (ref 11.9–15.1)
IMMATURE GRANULOCYTES: 0 %
LYMPHOCYTES # BLD: 45 % (ref 24–43)
MCH RBC QN AUTO: 28.1 PG (ref 25.2–33.5)
MCHC RBC AUTO-ENTMCNC: 31.2 G/DL (ref 28.4–34.8)
MCV RBC AUTO: 89.9 FL (ref 82.6–102.9)
MONOCYTES # BLD: 11 % (ref 3–12)
NRBC AUTOMATED: 0 PER 100 WBC
PDW BLD-RTO: 12.6 % (ref 11.8–14.4)
PLATELET # BLD: 206 K/UL (ref 138–453)
PLATELET ESTIMATE: ABNORMAL
PMV BLD AUTO: 10.1 FL (ref 8.1–13.5)
POTASSIUM SERPL-SCNC: 4.7 MMOL/L (ref 3.7–5.3)
RBC # BLD: 4.95 M/UL (ref 3.95–5.11)
RBC # BLD: ABNORMAL 10*6/UL
SEG NEUTROPHILS: 41 % (ref 36–65)
SEGMENTED NEUTROPHILS ABSOLUTE COUNT: 2.93 K/UL (ref 1.5–8.1)
SODIUM BLD-SCNC: 145 MMOL/L (ref 135–144)
WBC # BLD: 7.2 K/UL (ref 3.5–11.3)
WBC # BLD: ABNORMAL 10*3/UL

## 2020-11-16 PROCEDURE — 36415 COLL VENOUS BLD VENIPUNCTURE: CPT

## 2020-11-16 PROCEDURE — 80048 BASIC METABOLIC PNL TOTAL CA: CPT

## 2020-11-16 PROCEDURE — 85025 COMPLETE CBC W/AUTO DIFF WBC: CPT

## 2020-11-16 PROCEDURE — 99213 OFFICE O/P EST LOW 20 MIN: CPT | Performed by: FAMILY MEDICINE

## 2020-11-16 PROCEDURE — 84460 ALANINE AMINO (ALT) (SGPT): CPT

## 2020-11-16 PROCEDURE — 84450 TRANSFERASE (AST) (SGOT): CPT

## 2020-11-16 ASSESSMENT — PATIENT HEALTH QUESTIONNAIRE - PHQ9
1. LITTLE INTEREST OR PLEASURE IN DOING THINGS: 0
SUM OF ALL RESPONSES TO PHQ QUESTIONS 1-9: 0
SUM OF ALL RESPONSES TO PHQ9 QUESTIONS 1 & 2: 0
2. FEELING DOWN, DEPRESSED OR HOPELESS: 0

## 2020-11-16 NOTE — PROGRESS NOTES
I, TIERRA Sharp, am scribing for and in the presence of Dr. Aracelis Graham. [address] [address]  [place] [phone]  Dept: [phone]    HPI:  Pt. Presents for 1 month follow up on aphthous ulcers. On 10/12, she was started on colchicine 0.6 mg daily x2 weeks and then increased to bid. Today, she states she has only had one sore since her last visit. She is currently only taking one tab daily. Current Outpatient Medications   Medication Sig Dispense Refill    meloxicam (MOBIC) 15 MG tablet TAKE 1 TABLET BY MOUTH EVERY DAY (Patient taking differently: Take 15 mg by mouth every other day ) 90 tablet 0    diclofenac sodium (VOLTAREN) 1 % GEL Apply 2 g topically 2 times daily 1 Tube 3    colchicine (MITIGARE) 0.6 MG capsule Take 1 capsule by mouth daily x1 month then increase to 0.6 mg bid. 180 capsule 3    furosemide (LASIX) 40 MG tablet TAKE 1 TABLET BY MOUTH EVERY DAY 90 tablet 3    atorvastatin (LIPITOR) 20 MG tablet TAKE 1 TABLET BY MOUTH EVERY DAY 90 tablet 3    aspirin 81 MG tablet Take 81 mg by mouth daily      calcium carbonate (OSCAL) 500 MG TABS tablet Take 500 mg by mouth daily      Multiple Vitamins-Minerals (ALIVE ONCE DAILY WOMENS 50+ PO) Take by mouth      Ascorbic Acid (VITAMIN C) 250 MG tablet Take 250 mg by mouth daily      DEXAMETHASONE INTENSOL 1 MG/ML solution TAKE 1 ML BY MOUTH 4 TIMES DAILY  ML OF SUCRALFATE WITH 10 ML DEXAMETHASONE AND TAKE 1 TSP SWISH AND SPIT FOUR TIMES A DAY (Patient not taking: Reported on 10/12/2020) 10 mL 0    sucralfate (CARAFATE) 1 GM/10ML suspension  ML OF SUCRALFATE WITH 10 ML DEXAMETHASONE AND TAKE 1 TSP SWISH AND SPIT FOUR TIMES A DAY (Patient not taking: Reported on 10/12/2020) 120 mL 0    mometasone (ELOCON) 0.1 % cream Apply topically daily. (Patient not taking: Reported on 4/6/2020) 15 g 0     No current facility-administered medications for this visit. ROS:  Admits improvement in oral ulcers. EXAM:  /70   Pulse 65   Ht 5' 4\" (1.626 m)   Wt 200 lb (90.7 kg)   LMP  (LMP Unknown)   BMI 34.33 kg/m²   Wt Readings from Last 3 Encounters:   11/16/20 200 lb (90.7 kg)   10/12/20 199 lb (90.3 kg)   03/02/20 205 lb (93 kg)     BP Readings from Last 3 Encounters:   11/16/20 120/70   10/12/20 (!) 152/82   03/02/20 (!) 148/84     PHYSICAL EXAM:  General Appearance: in no acute distress, well developed, well nourished. Eyes: pupils equal, round reactive to light and accommodation. Ears: normal canal and TM's. Nose: nares patent, no lesions. Oral Cavity: mucosa moist.  Throat: clear. Neck/Thyroid: neck supple, full range of motion, no cervical lymphadenopathy, no thyromegaly or carotid bruits. Skin: warm and dry. No suspicious lesions. Heart: regular rate and rhythm. No murmurs. S1, S2 normal, no gallops. Lungs: clear to auscultation bilaterally. Abdomen: bowel sounds present, soft, nontender, nondistended, no masses or organomegaly. Musculoskeletal: normal, full range of motion in knees and hips, no swelling or tenderness. Extremities: no cyanosis or edema. Peripheral Pulses: 2+ throughout, symetric. Neurologic: nonfocal, motor strength normal upper and lower extremities, sensory exam intact. Psych: normal affect, speech fluent. ASSESSMENT:   Diagnosis Orders   1. Recurrent aphthous stomatitis     2. Encounter for long-term (current) use of medications  CBC With Auto Differential    Basic Metabolic Panel    ALT    AST         PLAN:  She is doing well with the one daily dose. I don't see a reason to increase this if she is doing well on this dose. I will get some labs to make sure this isn't affecting her kidneys or liver. I will plan for her to stay on this for about 6 months and see how she does weaning off. I will see her back for her regular check.       Orders Placed This Encounter   Procedures    CBC With Auto Differential

## 2020-11-16 NOTE — PATIENT INSTRUCTIONS
PLAN:  She is doing well with the one daily dose. I don't see a reason to increase this if she is doing well on this dose. I will get some labs to make sure this isn't affecting her kidneys or liver. I will plan for her to stay on this for about 6 months and see how she does weaning off. I will see her back for her regular check. SURVEY:    You may be receiving a survey from [employer] regarding your visit today. Please complete the survey to enable us to provide the highest quality of care to you and your family. If you cannot score us a very good on any question, please call the office to discuss how we could of made your experience a very good one. Thank you.

## 2020-12-03 ENCOUNTER — HOSPITAL ENCOUNTER (OUTPATIENT)
Dept: WOMENS IMAGING | Age: 65
Discharge: HOME | End: 2020-12-05
Payer: COMMERCIAL

## 2020-12-03 PROCEDURE — 77080 DXA BONE DENSITY AXIAL: CPT

## 2020-12-03 PROCEDURE — 77063 BREAST TOMOSYNTHESIS BI: CPT

## 2020-12-14 ENCOUNTER — OFFICE VISIT (OUTPATIENT)
Dept: FAMILY MEDICINE CLINIC | Age: 65
End: 2020-12-14
Payer: COMMERCIAL

## 2020-12-14 ENCOUNTER — HOSPITAL ENCOUNTER (OUTPATIENT)
Dept: LAB | Age: 65
Setting detail: SPECIMEN
Discharge: HOME | End: 2020-12-14
Payer: COMMERCIAL

## 2020-12-14 VITALS
HEIGHT: 64 IN | TEMPERATURE: 96.9 F | HEART RATE: 83 BPM | OXYGEN SATURATION: 99 % | DIASTOLIC BLOOD PRESSURE: 80 MMHG | BODY MASS INDEX: 34.15 KG/M2 | WEIGHT: 200 LBS | SYSTOLIC BLOOD PRESSURE: 132 MMHG

## 2020-12-14 PROCEDURE — 99214 OFFICE O/P EST MOD 30 MIN: CPT | Performed by: NURSE PRACTITIONER

## 2020-12-14 PROCEDURE — C9803 HOPD COVID-19 SPEC COLLECT: HCPCS

## 2020-12-14 PROCEDURE — U0003 INFECTIOUS AGENT DETECTION BY NUCLEIC ACID (DNA OR RNA); SEVERE ACUTE RESPIRATORY SYNDROME CORONAVIRUS 2 (SARS-COV-2) (CORONAVIRUS DISEASE [COVID-19]), AMPLIFIED PROBE TECHNIQUE, MAKING USE OF HIGH THROUGHPUT TECHNOLOGIES AS DESCRIBED BY CMS-2020-01-R: HCPCS

## 2020-12-14 ASSESSMENT — ENCOUNTER SYMPTOMS
RHINORRHEA: 1
SINUS PRESSURE: 1
NAUSEA: 0
WHEEZING: 0
CHEST TIGHTNESS: 1
VOMITING: 0
SINUS PAIN: 1
ABDOMINAL PAIN: 0
DIARRHEA: 0
SORE THROAT: 1
COUGH: 1
SHORTNESS OF BREATH: 1

## 2020-12-14 ASSESSMENT — PATIENT HEALTH QUESTIONNAIRE - PHQ9
1. LITTLE INTEREST OR PLEASURE IN DOING THINGS: 0
SUM OF ALL RESPONSES TO PHQ QUESTIONS 1-9: 0
SUM OF ALL RESPONSES TO PHQ QUESTIONS 1-9: 0
2. FEELING DOWN, DEPRESSED OR HOPELESS: 0
SUM OF ALL RESPONSES TO PHQ9 QUESTIONS 1 & 2: 0
SUM OF ALL RESPONSES TO PHQ QUESTIONS 1-9: 0

## 2020-12-14 NOTE — PATIENT INSTRUCTIONS
SURVEY:    You may be receiving a survey from [employer] regarding your visit today. Please complete the survey to enable us to provide the highest quality of care to you and your family. If you are unable to score a \"very good' on any question, please call the office to discuss how we can improve your experience. We appreciate your feedback. Thank you!

## 2020-12-14 NOTE — PROGRESS NOTES
Name: Goran Mina  : 1955         Chief Complaint:     Chief Complaint   Patient presents with    Congestion     Patient was possibly exposed to [employer]. She now has congestion, fatigue, left ear pain, SOB, drainage, head aches    Other     Started Saturday. History of Present Illness:      Goran Mina is a 76 y.o.  female who presents with Congestion (Patient was possibly exposed to [employer]. She now has congestion, fatigue, left ear pain, SOB, drainage, head aches) and Other (Started Saturday. )      HPI   The patient presents with symptoms of nasal congestion, sore throat, rhinorrhea, fatigue, left ear pain, shortness of breath with activity, and headache that began 2 days ago. She also admits feeling unsteady and shaky. Nasal drainage is clear. Headaches are constant. Cough is productive with clear/yellow mucus. She admits an exposure to COVID-19 four days ago. She denies additional sick contacts. She has taken generic over-the-counter cold medication, name unknown, which has helped with her nasal congestion. She has also used nasal saline for congestion and Tylenol for her headaches. Denies loss of taste or smell. Past Medical History:     Past Medical History:   Diagnosis Date    Allergic rhinitis     DDD (degenerative disc disease), cervical 2013    H/O echocardiogram 8/11/15    EF 65%. Aortic valve sclerosis without stenosis. Mild aortic regurgitation. Myxomatous thickening of the mitral leaflets with Moderate mitral regurgitation. Mils (grade l) diastolic dysfunction.  H/O echocardiogram 2016    EF >60%. Normal LV wall thickness and cavity size. No definite specific wall motion abnormalities were identified. Left atrium is mildly dilated (29-33) left atrial volume index of 31 ml/m2. Mild aortic stenosis. Myxomatous thickening of the mitral leaflets with moderate eccentric mitral regurg. Mild treicuspid regurg. Mild diastolic dysfunction is seen.  H/O echocardiogram 08/06/2018    EF >55%. The LV wall thickness is mildly incrased. Moderate mitral regurg. Mild aortic stenosis. Mild aortic regurg. Mild tricuspid regurg. Evidence of mild (grade I) diastolic dysfucntion is seen.  Hyperglycemia 2013    Hyperlipidemia     Hypertension     Mitral regurgitation 2010    MVP (mitral valve prolapse) 2010    Obesity 2010    Pulmonary hypertension ([address] 2013    Sleep apnea 2012    Uterine fibroid 2008    Venous insufficiency 2013    Wears glasses       Reviewed all health maintenance requirements and ordered appropriate tests  Health Maintenance Due   Topic Date Due    Shingles Vaccine (1 of 2) 12/09/2004    Pneumococcal 65+ years Vaccine (1 of 1 - PPSV23) 12/09/2019       Past Surgical History:     Past Surgical History:   Procedure Laterality Date    ABDOMEN SURGERY  10/12/2018    pelvic mass removed     COLONOSCOPY  08/13/2018    -polyp(benign lymphoid)hemorrhoids    COLONOSCOPY N/A 8/13/2018    COLONOSCOPY POLYPECTOMY SNARE/COLD BIOPSY performed by Pallavi Bush MD at [address]  2008    Dr. Vladimir Gomez  2009    HYSTERECTOMY  2009    supracervical, BSO with cervical conization, Dr. Sirena Concepcion ESWL Left 12/4/2018    ESWL [address] LITHOTRIPSY performed by Oracio Ta MD at [address]        Medications:       Prior to Admission medications    Medication Sig Start Date End Date Taking? Authorizing Provider   diclofenac sodium (VOLTAREN) 1 % GEL Apply 2 g topically 2 times daily 10/12/20  Yes Rex Hill MD   colchicine (MITIGARE) 0.6 MG capsule Take 1 capsule by mouth daily x1 month then increase to 0.6 mg bid.  10/12/20  Yes Rex Hill MD   furosemide (LASIX) 40 MG tablet TAKE 1 TABLET BY MOUTH EVERY DAY 9/25/20  Yes Rex Hill MD atorvastatin (LIPITOR) 20 MG tablet TAKE 1 TABLET BY MOUTH EVERY DAY 9/17/20  Yes Ant Neely MD   aspirin 81 MG tablet Take 81 mg by mouth daily   Yes Historical Provider, MD   calcium carbonate (OSCAL) 500 MG TABS tablet Take 500 mg by mouth daily   Yes Historical Provider, MD   Multiple Vitamins-Minerals (ALIVE ONCE DAILY WOMENS 50+ PO) Take by mouth   Yes Historical Provider, MD   Ascorbic Acid (VITAMIN C) 250 MG tablet Take 250 mg by mouth daily   Yes Historical Provider, MD   meloxicam (MOBIC) 15 MG tablet TAKE 1 TABLET BY MOUTH EVERY DAY  Patient taking differently: Take 15 mg by mouth every other day  10/12/20   Ant Neely MD   DEXAMETHASONE INTENSOL 1 MG/ML solution TAKE 1 ML BY MOUTH 4 TIMES DAILY  ML OF SUCRALFATE WITH 10 ML DEXAMETHASONE AND TAKE 1 TSP SWISH AND SPIT FOUR TIMES A DAY  Patient not taking: Reported on 10/12/2020 4/24/20   Ant Neely MD   sucralfate (CARAFATE) 1 GM/10ML suspension  ML OF SUCRALFATE WITH 10 ML DEXAMETHASONE AND TAKE 1 TSP SWISH AND SPIT FOUR TIMES A DAY  Patient not taking: Reported on 10/12/2020 4/23/20   Ant Neely MD   mometasone (ELOCON) 0.1 % cream Apply topically daily. Patient not taking: Reported on 4/6/2020 6/12/19   Ant Neely MD   lovastatin (MEVACOR) 20 MG tablet Take 1 tablet by mouth daily 4/5/16 8/23/16  Ant Neely MD        Allergies:       Seasonal    Social History:     Tobacco:    reports that she has never smoked. She has never used smokeless tobacco.  Alcohol:      reports no history of alcohol use. Drug Use:  reports no history of drug use.     Family History:     Family History   Problem Relation Age of Onset    High Blood Pressure Father     Heart Disease Father     Stroke Father     Dementia Mother     Other Mother         thoracic AAA    Cancer Mother         bilateral kidney       Review of Systems:     Positive and Negative as described in HPI    Review of Systems Constitutional: Positive for chills (Admits to chills that are not long-lasting) and fatigue. Negative for fever. HENT: Positive for congestion, ear pain, rhinorrhea, sinus pressure, sinus pain and sore throat. Negative for sneezing. Admits canker sores x2 that are not resolved with use of colchicine. Respiratory: Positive for cough (Admits cough only when needing to clear her throat), chest tightness (Admits chest heaviness) and shortness of breath. Negative for wheezing. Cardiovascular: Positive for palpitations. Negative for chest pain. Gastrointestinal: Negative for abdominal pain, diarrhea, nausea and vomiting. Musculoskeletal: Positive for myalgias. Neurological: Positive for dizziness, light-headedness and headaches. Physical Exam:   Vitals:  /80   Pulse 83   Temp 96.9 °F (36.1 °C)   Ht 5' 4\" (1.626 m)   Wt 200 lb (90.7 kg)   LMP  (LMP Unknown)   SpO2 99%   BMI 34.33 kg/m²     Physical Exam  Constitutional:       General: She is not in acute distress. Appearance: Normal appearance. She is ill-appearing. She is not toxic-appearing. HENT:      Head: Normocephalic. Right Ear: Tympanic membrane, ear canal and external ear normal. There is no impacted cerumen. Left Ear: Ear canal and external ear normal. There is no impacted cerumen. Ears:      Comments: Scant erythema left TM at 12 o'clock position     Nose: Nose normal. No congestion or rhinorrhea. Mouth/Throat:      Mouth: Mucous membranes are moist.      Pharynx: No oropharyngeal exudate or posterior oropharyngeal erythema. Comments: Small ulceration left lateral portion of tongue and tip of tongue  Eyes:      General:         Right eye: No discharge. Left eye: No discharge. Conjunctiva/sclera: Conjunctivae normal.   Neck:      Musculoskeletal: Neck supple. Cardiovascular:      Rate and Rhythm: Normal rate and regular rhythm. Heart sounds: Murmur (Systolic murmur, left sternal border) present. Pulmonary:      Effort: Pulmonary effort is normal. No respiratory distress. Breath sounds: Normal breath sounds. No stridor. No wheezing, rhonchi or rales. Abdominal:      General: Abdomen is flat. There is no distension. Palpations: There is no mass. Tenderness: There is no abdominal tenderness. There is no guarding. Hernia: No hernia is present. Comments: Hyperactive bowel sounds   Lymphadenopathy:      Cervical: No cervical adenopathy. Skin:     General: Skin is warm. Neurological:      Mental Status: She is alert and oriented to person, place, and time. Psychiatric:         Mood and Affect: Mood normal.         Behavior: Behavior normal.         Thought Content: Thought content normal.         Judgment: Judgment normal.         Data:     Lab Results   Component Value Date     11/16/2020    K 4.7 11/16/2020     11/16/2020    CO2 31 11/16/2020    BUN 15 11/16/2020    CREATININE 0.83 11/16/2020    GLUCOSE 93 11/16/2020    GLUCOSE 98 05/08/2012    PROT 7.0 06/22/2018    LABALBU 3.7 06/22/2018    LABALBU 4.2 05/08/2012    BILITOT 0.38 06/22/2018    ALKPHOS 101 06/22/2018    AST 26 11/16/2020    ALT 34 11/16/2020     Lab Results   Component Value Date    WBC 7.2 11/16/2020    RBC 4.95 11/16/2020    RBC 4.52 05/08/2012    HGB 13.9 11/16/2020    HCT 44.5 11/16/2020    MCV 89.9 11/16/2020    MCH 28.1 11/16/2020    MCHC 31.2 11/16/2020    RDW 12.6 11/16/2020     11/16/2020     05/08/2012    MPV 10.1 11/16/2020     Lab Results   Component Value Date    TSH 2.20 07/15/2015     Lab Results   Component Value Date    CHOL 109 09/29/2020    HDL 48 09/29/2020    LABA1C 6.0 09/29/2020       Assessment/Plan:      Diagnosis Orders   1. Cough  COVID-19 Ambulatory   2.  Aphthous stomatitis         Cough: Afebrile on examination. SpO2 99%. Lung sounds clear to auscultation. Due to recent exposure, I recommend she be tested for COVID-19. Patient is agreeable to plan, test ordered today. Instructed to quarantine until results. Recommended rest and hydration. Mucinex and Tylenol prn to assist with symptoms. Reviewed the importance of monitoring symptoms, especially fever and shortness of breath. If symptoms worsen or persist, she was instructed to notify the office. Patient instructed to present to emergency department if symptoms become severe. Aphthous Stomatitis:  Patient admits worsening aphthous stomatitis symptoms. She has been trialed on multiple topical solutions and is currently taking colchicine 0.6 mg once daily. She was evaluated in the office 11/16/2020. On this date, symptoms were stable and she was encouraged to remain on the 0.6 mg once daily dose. However, because her symptoms are worsening she was instructed today to increase to 0.6 mg BID. Kidney function and liver function assessed 11/16/2020 mostly WNL    Completed Refills   Requested Prescriptions      No prescriptions requested or ordered in this encounter       Orders Placed This Encounter   Procedures    COVID-19 Ambulatory     Standing Status:   Future     Number of Occurrences:   1     Standing Expiration Date:   12/14/2021     Scheduling Instructions:      Saline media preferred given current shortage of viral transport media but both acceptable     Order Specific Question:   Is this test for diagnosis or screening? Answer:   Diagnosis of ill patient     Order Specific Question:   Symptomatic for COVID-19 as defined by CDC? Answer:   Yes     Order Specific Question:   Date of Symptom Onset     Answer:   12/12/2020     Order Specific Question:   Hospitalized for COVID-19? Answer:   No     Order Specific Question:   Admitted to ICU for COVID-19? Answer:    No Order Specific Question:   Employed in healthcare setting? Answer:   No     Order Specific Question:   Resident in a congregate (group) care setting? Answer:   No     Order Specific Question:   Pregnant? Answer:   No     Order Specific Question:   Previously tested for COVID-19? Answer:   No        No results found for this visit on 12/14/20. Return if symptoms worsen or fail to improve.     Electronically signed by AIDEE Carpenter CNP on 12/14/20 at 2:50 PM.

## 2020-12-15 LAB — SARS-COV-2, NAA: NOT DETECTED

## 2020-12-16 RX ORDER — AMOXICILLIN AND CLAVULANATE POTASSIUM 875; 125 MG/1; MG/1
1 TABLET, FILM COATED ORAL 2 TIMES DAILY
Qty: 12 TABLET | Refills: 0 | Status: SHIPPED | OUTPATIENT
Start: 2020-12-16 | End: 2020-12-22

## 2020-12-16 NOTE — PROGRESS NOTES
Patient notified [employer] results negative. She states that she feels better today. Her headache has improved. She admits improving cough, nasal congestion and shortness of breath. She admits continued shortness of breath with activity. Denies body aches, fever, or chills. She is taking Mucinex but admits continued left ear pain and canker sore pain. Because her ear pain is continuing without relief and erythema was noted on her left TM during examination, will treat with antibiotics. Patient instructed to call office if symptoms worsen or persist.  Continue increased colchicine dose for canker sore pain.

## 2021-01-04 RX ORDER — MELOXICAM 15 MG/1
TABLET ORAL
Qty: 90 TABLET | Refills: 0 | Status: SHIPPED | OUTPATIENT
Start: 2021-01-04 | End: 2021-03-29

## 2021-02-01 DIAGNOSIS — M17.11 PRIMARY OSTEOARTHRITIS OF RIGHT KNEE: ICD-10-CM

## 2021-02-17 ENCOUNTER — HOSPITAL ENCOUNTER (OUTPATIENT)
Dept: GENERAL RADIOLOGY | Age: 66
Discharge: HOME | End: 2021-02-19
Payer: COMMERCIAL

## 2021-02-17 ENCOUNTER — HOSPITAL ENCOUNTER (OUTPATIENT)
Age: 66
Discharge: HOME | End: 2021-02-19
Payer: COMMERCIAL

## 2021-02-17 DIAGNOSIS — N20.0 KIDNEY STONE: ICD-10-CM

## 2021-02-17 PROCEDURE — 74018 RADEX ABDOMEN 1 VIEW: CPT

## 2021-02-24 ENCOUNTER — HOSPITAL ENCOUNTER (OUTPATIENT)
Age: 66
Setting detail: SPECIMEN
Discharge: HOME | End: 2021-02-24
Payer: COMMERCIAL

## 2021-02-24 ENCOUNTER — OFFICE VISIT (OUTPATIENT)
Dept: UROLOGY | Age: 66
End: 2021-02-24
Payer: COMMERCIAL

## 2021-02-24 VITALS
TEMPERATURE: 96 F | SYSTOLIC BLOOD PRESSURE: 132 MMHG | BODY MASS INDEX: 34.45 KG/M2 | HEIGHT: 64 IN | WEIGHT: 201.8 LBS | DIASTOLIC BLOOD PRESSURE: 72 MMHG

## 2021-02-24 DIAGNOSIS — N20.0 KIDNEY STONE: Primary | ICD-10-CM

## 2021-02-24 DIAGNOSIS — R31.0 GROSS HEMATURIA: ICD-10-CM

## 2021-02-24 LAB
-: ABNORMAL
AMORPHOUS: ABNORMAL
BACTERIA: ABNORMAL
BILIRUBIN URINE: NEGATIVE
CASTS UA: ABNORMAL /LPF
COLOR: YELLOW
COMMENT UA: ABNORMAL
CRYSTALS, UA: ABNORMAL /HPF
EPITHELIAL CELLS UA: ABNORMAL /HPF (ref 0–25)
GLUCOSE URINE: NEGATIVE
KETONES, URINE: NEGATIVE
LEUKOCYTE ESTERASE, URINE: ABNORMAL
MUCUS: ABNORMAL
NITRITE, URINE: NEGATIVE
OTHER OBSERVATIONS UA: ABNORMAL
PH UA: 6 (ref 5–9)
PROTEIN UA: NEGATIVE
RBC UA: ABNORMAL /HPF (ref 0–2)
RENAL EPITHELIAL, UA: ABNORMAL /HPF
SPECIFIC GRAVITY UA: 1.02 (ref 1.01–1.02)
TRICHOMONAS: ABNORMAL
TURBIDITY: CLEAR
URINE HGB: NEGATIVE
UROBILINOGEN, URINE: NORMAL
WBC UA: ABNORMAL /HPF (ref 0–5)
YEAST: ABNORMAL

## 2021-02-24 PROCEDURE — 87086 URINE CULTURE/COLONY COUNT: CPT

## 2021-02-24 PROCEDURE — 99213 OFFICE O/P EST LOW 20 MIN: CPT | Performed by: UROLOGY

## 2021-02-24 PROCEDURE — 81001 URINALYSIS AUTO W/SCOPE: CPT

## 2021-02-24 ASSESSMENT — ENCOUNTER SYMPTOMS
NAUSEA: 0
EYE PAIN: 0
EYE REDNESS: 0
ABDOMINAL PAIN: 0
SHORTNESS OF BREATH: 0
COLOR CHANGE: 0
COUGH: 0
WHEEZING: 0
BACK PAIN: 0
VOMITING: 0

## 2021-02-24 NOTE — PATIENT INSTRUCTIONS
SURVEY:    You may be receiving a survey from [employer] regarding your visit today. Please complete the survey to enable us to provide the highest quality of care to you and your family. If you cannot score us a very good on any question, please call the office to discuss how we could of made your experience a very good one. Thank you. Schedule a Vaccine  When you qualify to receive the vaccine per the [address] guidelines, call the [facility]) COVID-19 Vaccination Hotline to schedule your appointment or to get additional information about the [facility]) locations which are offering the COVID-19 vaccine. To be most effective, it's important that you receive two doses of one of the COVID-19 vaccines. -If you are receiving the Cordero Peter vaccine, your second shot will be scheduled as close to 21 days after the first shot as possible. -If you are receiving the Moderna vaccine, your second shot will be scheduled as close to 28 days after the first shot as possible. [address] Vaccination Hotline: 306.166.2165    In partnership with [facility] and \[facility] [place] [facility]\"" HEALTH Departments, patients can call [phone], Monday-Friday 8:00am-4:00pm for scheduling at our Hospitals. Or visit the [facility] websites for additional information of vaccine administration locations. Links to [facility]) website and Health Department websites:    [email or web address]    [email or web address]    [email or web address]

## 2021-02-24 NOTE — PROGRESS NOTES
HPI:        Patient is a 76 y.o. female in no acute distress. She is alert and oriented to person, place, and time. History  8/6/2018 Referral from Dr. Rosalina Gonzales for gross hematuria. Lei First first noticed the hematuria in the spring and has had multiple episodes since.  This has not been associated with lower urinary tract symptoms, but she does experience suprapubic \"cramping\".  She was never a smoker. Lei First does work for [employer]. Lei First denies history of kidney stones or frequent urinary tract infection.  She did have a hysterectomy in 2009, but does have her ovaries.       8/2018 CT showed punctate nonobstructing stones in the right, and a 5 mm nonobstructing stone in the left kidney but there is no hydronephrosis.  There is no no masses or filling defects in the upper collecting system, ureters, or bladder to suggest malignancy.  The radiologist as mentioned a multicystic residual right ovary and a mass lesion of the vaginal cuff.  She did see gynecology for this and they are completing a work-up for her GYN abnormalities. Cystoscopy was negative.     10/2018 excision of pelvic mass     12/2018 left ESWL     Currently  Patient is here today for annual follow-up. Patient is doing well. She did get a recent KUB. This is for annual stone for surveillance. This film was independently reviewed today. Radiology does feel that there could be some stones in the right kidney. I am unsure if this is a bowel content. Patient has had no new or worsening lower urinary tract symptoms. Patient did have an episode of gross blood per rectum approximately 2 weeks ago. She is going to address this with her primary care doctor. She has no flank pain nausea vomiting. She has had no spontaneous stone passage. She has had no urinary tract infections. No pain today.     Past Medical History:   Diagnosis Date    Allergic rhinitis 1992    DDD (degenerative disc disease), cervical 2013  H/O echocardiogram 8/11/15    EF 65%. Aortic valve sclerosis without stenosis. Mild aortic regurgitation. Myxomatous thickening of the mitral leaflets with Moderate mitral regurgitation. Mils (grade l) diastolic dysfunction.  H/O echocardiogram 09/02/2016    EF >60%. Normal LV wall thickness and cavity size. No definite specific wall motion abnormalities were identified. Left atrium is mildly dilated (29-33) left atrial volume index of 31 ml/m2. Mild aortic stenosis. Myxomatous thickening of the mitral leaflets with moderate eccentric mitral regurg. Mild treicuspid regurg. Mild diastolic dysfunction is seen.  H/O echocardiogram 08/06/2018    EF >55%. The LV wall thickness is mildly incrased. Moderate mitral regurg. Mild aortic stenosis. Mild aortic regurg. Mild tricuspid regurg. Evidence of mild (grade I) diastolic dysfucntion is seen.      Hyperglycemia 2013    Hyperlipidemia     Hypertension     Mitral regurgitation 2010    MVP (mitral valve prolapse) 2010    Obesity 2010    Pulmonary hypertension ([address] 2013    Sleep apnea 2012    Uterine fibroid 2008    Venous insufficiency 2013    Wears glasses      Past Surgical History:   Procedure Laterality Date    ABDOMEN SURGERY  10/12/2018    pelvic mass removed     COLONOSCOPY  08/13/2018    -polyp(benign lymphoid)hemorrhoids    COLONOSCOPY N/A 8/13/2018    COLONOSCOPY POLYPECTOMY SNARE/COLD BIOPSY performed by Elif Pan MD at [address]  2008    Dr. Rachel Monsivais  2009    HYSTERECTOMY  2009    supracervical, BSO with cervical conization, Dr. Danilo Meyer ESWL Left 12/4/2018    ESWL [address] LITHOTRIPSY performed by Anuel Murray MD at [address] Encounter Medications as of 2/24/2021   Medication Sig Dispense Refill  diclofenac sodium (VOLTAREN) 1 % GEL APPLY 2 G TOPICALLY 2 TIMES DAILY 100 g 3    meloxicam (MOBIC) 15 MG tablet TAKE 1 TABLET BY MOUTH EVERY DAY 90 tablet 0    colchicine (MITIGARE) 0.6 MG capsule Take 1 capsule by mouth daily x1 month then increase to 0.6 mg bid. 180 capsule 3    furosemide (LASIX) 40 MG tablet TAKE 1 TABLET BY MOUTH EVERY DAY 90 tablet 3    atorvastatin (LIPITOR) 20 MG tablet TAKE 1 TABLET BY MOUTH EVERY DAY 90 tablet 3    aspirin 81 MG tablet Take 81 mg by mouth daily      calcium carbonate (OSCAL) 500 MG TABS tablet Take 500 mg by mouth daily      Multiple Vitamins-Minerals (ALIVE ONCE DAILY WOMENS 50+ PO) Take by mouth      Ascorbic Acid (VITAMIN C) 250 MG tablet Take 250 mg by mouth daily      DEXAMETHASONE INTENSOL 1 MG/ML solution TAKE 1 ML BY MOUTH 4 TIMES DAILY  ML OF SUCRALFATE WITH 10 ML DEXAMETHASONE AND TAKE 1 TSP SWISH AND SPIT FOUR TIMES A DAY (Patient not taking: Reported on 10/12/2020) 10 mL 0    sucralfate (CARAFATE) 1 GM/10ML suspension  ML OF SUCRALFATE WITH 10 ML DEXAMETHASONE AND TAKE 1 TSP SWISH AND SPIT FOUR TIMES A DAY (Patient not taking: Reported on 10/12/2020) 120 mL 0    mometasone (ELOCON) 0.1 % cream Apply topically daily. (Patient not taking: Reported on 4/6/2020) 15 g 0    [DISCONTINUED] lovastatin (MEVACOR) 20 MG tablet Take 1 tablet by mouth daily 90 tablet 3     No facility-administered encounter medications on file as of 2/24/2021. Current Outpatient Medications on File Prior to Visit   Medication Sig Dispense Refill    diclofenac sodium (VOLTAREN) 1 % GEL APPLY 2 G TOPICALLY 2 TIMES DAILY 100 g 3    meloxicam (MOBIC) 15 MG tablet TAKE 1 TABLET BY MOUTH EVERY DAY 90 tablet 0    colchicine (MITIGARE) 0.6 MG capsule Take 1 capsule by mouth daily x1 month then increase to 0.6 mg bid.  180 capsule 3    furosemide (LASIX) 40 MG tablet TAKE 1 TABLET BY MOUTH EVERY DAY 90 tablet 3  atorvastatin (LIPITOR) 20 MG tablet TAKE 1 TABLET BY MOUTH EVERY DAY 90 tablet 3    aspirin 81 MG tablet Take 81 mg by mouth daily      calcium carbonate (OSCAL) 500 MG TABS tablet Take 500 mg by mouth daily      Multiple Vitamins-Minerals (ALIVE ONCE DAILY WOMENS 50+ PO) Take by mouth      Ascorbic Acid (VITAMIN C) 250 MG tablet Take 250 mg by mouth daily      DEXAMETHASONE INTENSOL 1 MG/ML solution TAKE 1 ML BY MOUTH 4 TIMES DAILY  ML OF SUCRALFATE WITH 10 ML DEXAMETHASONE AND TAKE 1 TSP SWISH AND SPIT FOUR TIMES A DAY (Patient not taking: Reported on 10/12/2020) 10 mL 0    sucralfate (CARAFATE) 1 GM/10ML suspension  ML OF SUCRALFATE WITH 10 ML DEXAMETHASONE AND TAKE 1 TSP SWISH AND SPIT FOUR TIMES A DAY (Patient not taking: Reported on 10/12/2020) 120 mL 0    mometasone (ELOCON) 0.1 % cream Apply topically daily. (Patient not taking: Reported on 4/6/2020) 15 g 0    [DISCONTINUED] lovastatin (MEVACOR) 20 MG tablet Take 1 tablet by mouth daily 90 tablet 3     No current facility-administered medications on file prior to visit. Seasonal  Family History   Problem Relation Age of Onset    High Blood Pressure Father     Heart Disease Father     Stroke Father     Dementia Mother     Other Mother         thoracic AAA    Cancer Mother         bilateral kidney     Social History     Tobacco Use   Smoking Status Never Smoker   Smokeless Tobacco Never Used       Social History     Substance and Sexual Activity   Alcohol Use No       Review of Systems   Constitutional: Negative for appetite change, chills and fever. Eyes: Negative for pain, redness and visual disturbance. Respiratory: Negative for cough, shortness of breath and wheezing. Cardiovascular: Negative for chest pain and leg swelling. Gastrointestinal: Negative for abdominal pain, nausea and vomiting.

## 2021-02-25 LAB
CULTURE: NORMAL
Lab: NORMAL
SPECIMEN DESCRIPTION: NORMAL

## 2021-02-26 ENCOUNTER — TELEPHONE (OUTPATIENT)
Dept: UROLOGY | Age: 66
End: 2021-02-26

## 2021-02-26 NOTE — TELEPHONE ENCOUNTER
----- Message from AIDEE French CNP sent at 2/26/2021  7:30 AM EST -----  Call pt - urine cx reviewed and negative for UTI & for significant microhematuria

## 2021-03-29 RX ORDER — MELOXICAM 15 MG/1
TABLET ORAL
Qty: 90 TABLET | Refills: 0 | Status: SHIPPED | OUTPATIENT
Start: 2021-03-29 | End: 2021-07-01

## 2021-04-02 ENCOUNTER — HOSPITAL ENCOUNTER (OUTPATIENT)
Age: 66
Discharge: HOME | End: 2021-04-02
Payer: COMMERCIAL

## 2021-04-02 DIAGNOSIS — R73.9 HYPERGLYCEMIA: ICD-10-CM

## 2021-04-02 DIAGNOSIS — E66.09 CLASS 1 OBESITY DUE TO EXCESS CALORIES WITH SERIOUS COMORBIDITY AND BODY MASS INDEX (BMI) OF 33.0 TO 33.9 IN ADULT: ICD-10-CM

## 2021-04-02 DIAGNOSIS — I10 ESSENTIAL HYPERTENSION: ICD-10-CM

## 2021-04-02 DIAGNOSIS — K12.0 RECURRENT APHTHOUS STOMATITIS: ICD-10-CM

## 2021-04-02 LAB
ALT SERPL-CCNC: 42 U/L (ref 5–33)
ANION GAP SERPL CALCULATED.3IONS-SCNC: 5 MMOL/L (ref 9–17)
AST SERPL-CCNC: 25 U/L
BUN BLDV-MCNC: 14 MG/DL (ref 8–23)
BUN/CREAT BLD: 17 (ref 9–20)
CALCIUM SERPL-MCNC: 9.5 MG/DL (ref 8.6–10.4)
CHLORIDE BLD-SCNC: 105 MMOL/L (ref 98–107)
CO2: 31 MMOL/L (ref 20–31)
CREAT SERPL-MCNC: 0.81 MG/DL (ref 0.5–0.9)
GFR AFRICAN AMERICAN: >60 ML/MIN
GFR NON-AFRICAN AMERICAN: >60 ML/MIN
GFR SERPL CREATININE-BSD FRML MDRD: ABNORMAL ML/MIN/{1.73_M2}
GFR SERPL CREATININE-BSD FRML MDRD: ABNORMAL ML/MIN/{1.73_M2}
GLUCOSE BLD-MCNC: 81 MG/DL (ref 70–99)
HCT VFR BLD CALC: 41.8 % (ref 36.3–47.1)
HEMOGLOBIN: 13.5 G/DL (ref 11.9–15.1)
HIGH SENSITIVE C-REACTIVE PROTEIN: 1.3 MG/L
MCH RBC QN AUTO: 29.1 PG (ref 25.2–33.5)
MCHC RBC AUTO-ENTMCNC: 32.3 G/DL (ref 28.4–34.8)
MCV RBC AUTO: 90.1 FL (ref 82.6–102.9)
NRBC AUTOMATED: 0 PER 100 WBC
PDW BLD-RTO: 12.3 % (ref 11.8–14.4)
PLATELET # BLD: 198 K/UL (ref 138–453)
PMV BLD AUTO: 9.5 FL (ref 8.1–13.5)
POTASSIUM SERPL-SCNC: 4.9 MMOL/L (ref 3.7–5.3)
RBC # BLD: 4.64 M/UL (ref 3.95–5.11)
SODIUM BLD-SCNC: 141 MMOL/L (ref 135–144)
WBC # BLD: 6.9 K/UL (ref 3.5–11.3)

## 2021-04-02 PROCEDURE — 80048 BASIC METABOLIC PNL TOTAL CA: CPT

## 2021-04-02 PROCEDURE — 83036 HEMOGLOBIN GLYCOSYLATED A1C: CPT

## 2021-04-02 PROCEDURE — 85027 COMPLETE CBC AUTOMATED: CPT

## 2021-04-02 PROCEDURE — 86141 C-REACTIVE PROTEIN HS: CPT

## 2021-04-02 PROCEDURE — 84450 TRANSFERASE (AST) (SGOT): CPT

## 2021-04-02 PROCEDURE — 84460 ALANINE AMINO (ALT) (SGPT): CPT

## 2021-04-02 PROCEDURE — 36415 COLL VENOUS BLD VENIPUNCTURE: CPT

## 2021-04-06 LAB
ESTIMATED AVERAGE GLUCOSE: 117 MG/DL
HBA1C MFR BLD: 5.7 % (ref 4–6)

## 2021-04-07 RX ORDER — COLCHICINE 0.6 MG/1
0.6 TABLET ORAL DAILY
Qty: 90 TABLET | Refills: 3 | Status: SHIPPED | OUTPATIENT
Start: 2021-04-07 | End: 2021-04-09 | Stop reason: DRUGHIGH

## 2021-04-09 ENCOUNTER — OFFICE VISIT (OUTPATIENT)
Dept: FAMILY MEDICINE CLINIC | Age: 66
End: 2021-04-09
Payer: COMMERCIAL

## 2021-04-09 VITALS
SYSTOLIC BLOOD PRESSURE: 132 MMHG | WEIGHT: 202.2 LBS | BODY MASS INDEX: 34.52 KG/M2 | HEIGHT: 64 IN | DIASTOLIC BLOOD PRESSURE: 76 MMHG

## 2021-04-09 DIAGNOSIS — K12.0 APHTHOUS STOMATITIS: ICD-10-CM

## 2021-04-09 DIAGNOSIS — Z00.00 WELL ADULT HEALTH CHECK: Primary | ICD-10-CM

## 2021-04-09 DIAGNOSIS — I10 ESSENTIAL HYPERTENSION: ICD-10-CM

## 2021-04-09 DIAGNOSIS — R73.9 HYPERGLYCEMIA: ICD-10-CM

## 2021-04-09 DIAGNOSIS — E66.09 CLASS 1 OBESITY DUE TO EXCESS CALORIES WITHOUT SERIOUS COMORBIDITY WITH BODY MASS INDEX (BMI) OF 33.0 TO 33.9 IN ADULT: ICD-10-CM

## 2021-04-09 DIAGNOSIS — E78.5 HYPERLIPIDEMIA, UNSPECIFIED HYPERLIPIDEMIA TYPE: ICD-10-CM

## 2021-04-09 DIAGNOSIS — I27.20 PULMONARY HYPERTENSION (HCC): ICD-10-CM

## 2021-04-09 PROCEDURE — 99397 PER PM REEVAL EST PAT 65+ YR: CPT | Performed by: FAMILY MEDICINE

## 2021-04-09 PROCEDURE — 99213 OFFICE O/P EST LOW 20 MIN: CPT | Performed by: FAMILY MEDICINE

## 2021-04-09 RX ORDER — COLCHICINE 0.6 MG/1
0.6 TABLET ORAL 2 TIMES DAILY
Qty: 180 TABLET | Refills: 3 | Status: SHIPPED | OUTPATIENT
Start: 2021-04-09 | End: 2022-09-09

## 2021-04-09 SDOH — ECONOMIC STABILITY: TRANSPORTATION INSECURITY
IN THE PAST 12 MONTHS, HAS THE LACK OF TRANSPORTATION KEPT YOU FROM MEDICAL APPOINTMENTS OR FROM GETTING MEDICATIONS?: NOT ASKED

## 2021-04-09 SDOH — ECONOMIC STABILITY: FOOD INSECURITY: WITHIN THE PAST 12 MONTHS, YOU WORRIED THAT YOUR FOOD WOULD RUN OUT BEFORE YOU GOT MONEY TO BUY MORE.: NEVER TRUE

## 2021-04-09 SDOH — ECONOMIC STABILITY: INCOME INSECURITY: HOW HARD IS IT FOR YOU TO PAY FOR THE VERY BASICS LIKE FOOD, HOUSING, MEDICAL CARE, AND HEATING?: NOT HARD AT ALL

## 2021-04-09 ASSESSMENT — PATIENT HEALTH QUESTIONNAIRE - PHQ9
1. LITTLE INTEREST OR PLEASURE IN DOING THINGS: 0
2. FEELING DOWN, DEPRESSED OR HOPELESS: 0
SUM OF ALL RESPONSES TO PHQ9 QUESTIONS 1 & 2: 0
SUM OF ALL RESPONSES TO PHQ QUESTIONS 1-9: 0
SUM OF ALL RESPONSES TO PHQ QUESTIONS 1-9: 0

## 2021-04-09 NOTE — PATIENT INSTRUCTIONS
SURVEY:    You may be receiving a survey from [employer] regarding your visit today. Please complete the survey to enable us to provide the highest quality of care to you and your family. If you cannot score us a very good on any question, please call the office to discuss how we could of made your experience a very good one. Thank you. PLAN:  She is able to tolerate the Colchicine once a day and states it has helped, I will increase this to twice a day and see if these are eliminated completely. Labs reviewed with no concerns    ECHO reviewed. I discuss different exercises she can work on at home. I will see her back in 4 months with labs prior.

## 2021-04-09 NOTE — PROGRESS NOTES
TIERRA Roberts, am scribing for and in the presence of Dr. Diane Chand. 4/9/21/9:00am/SNP    [address] [address]  [place] [phone]  Dept: [phone]    Aubrey Staff is a 76 y.o. female here for Wellness Program, Hypertension, Hyperglycemia, and Hyperlipidemia    HPI:  HYPERTENSION:  She is exercising (walking). She is adherent to a low-salt diet.    Blood pressure is not being monitored at home.       HYPERGLYCEMIA:  Diet compliance: compliant most of the time  Current exercise: walking     HYPERLIPIDEMIA:     Medication compliance: compliant all of the time. Patient is  following a low fat, low cholesterol diet.    She is exercising regularly, walking and stretches at home    Prior to Admission medications    Medication Sig Start Date End Date Taking? Authorizing Provider   colchicine (COLCRYS) 0.6 MG tablet Take 1 tablet by mouth 2 times daily 4/9/21  Yes Diane Chand MD   meloxicam (MOBIC) 15 MG tablet TAKE 1 TABLET BY MOUTH EVERY DAY 3/29/21  Yes Diane Chand MD   diclofenac sodium (VOLTAREN) 1 % GEL APPLY 2 G TOPICALLY 2 TIMES DAILY 2/1/21  Yes Diane Chand MD   furosemide (LASIX) 40 MG tablet TAKE 1 TABLET BY MOUTH EVERY DAY 9/25/20  Yes Diane Chand MD   atorvastatin (LIPITOR) 20 MG tablet TAKE 1 TABLET BY MOUTH EVERY DAY 9/17/20  Yes Diane Chand MD   aspirin 81 MG tablet Take 81 mg by mouth daily   Yes Historical Provider, MD   calcium carbonate (OSCAL) 500 MG TABS tablet Take 500 mg by mouth daily   Yes Historical Provider, MD   Multiple Vitamins-Minerals (ALIVE ONCE DAILY WOMENS 50+ PO) Take by mouth   Yes Historical Provider, MD   Ascorbic Acid (VITAMIN C) 250 MG tablet Take 250 mg by mouth daily   Yes Historical Provider, MD   mometasone (ELOCON) 0.1 % cream Apply topically daily. Patient not taking: Reported on 4/6/2020 6/12/19   Diane Chand MD     ROS:  General Constitutional: Denies chills. Denies fever.  Denies History:   Diagnosis Date    Allergic rhinitis 1992    DDD (degenerative disc disease), cervical 2013    H/O echocardiogram 8/11/15    EF 65%. Aortic valve sclerosis without stenosis. Mild aortic regurgitation. Myxomatous thickening of the mitral leaflets with Moderate mitral regurgitation. Mils (grade l) diastolic dysfunction.  H/O echocardiogram 09/02/2016    EF >60%. Normal LV wall thickness and cavity size. No definite specific wall motion abnormalities were identified. Left atrium is mildly dilated (29-33) left atrial volume index of 31 ml/m2. Mild aortic stenosis. Myxomatous thickening of the mitral leaflets with moderate eccentric mitral regurg. Mild treicuspid regurg. Mild diastolic dysfunction is seen.  H/O echocardiogram 08/06/2018    EF >55%. The LV wall thickness is mildly incrased. Moderate mitral regurg. Mild aortic stenosis. Mild aortic regurg. Mild tricuspid regurg. Evidence of mild (grade I) diastolic dysfucntion is seen.      Hyperglycemia 2013    Hyperlipidemia     Hypertension     Mitral regurgitation 2010    MVP (mitral valve prolapse) 2010    Obesity 2010    Pulmonary hypertension ([address] 2013    Sleep apnea 2012    Uterine fibroid 2008    Venous insufficiency 2013    Wears glasses       Social History     Tobacco Use    Smoking status: Never Smoker    Smokeless tobacco: Never Used   Substance Use Topics    Alcohol use: No      Current Outpatient Medications   Medication Sig Dispense Refill    colchicine (COLCRYS) 0.6 MG tablet Take 1 tablet by mouth 2 times daily 180 tablet 3    meloxicam (MOBIC) 15 MG tablet TAKE 1 TABLET BY MOUTH EVERY DAY 90 tablet 0    diclofenac sodium (VOLTAREN) 1 % GEL APPLY 2 G TOPICALLY 2 TIMES DAILY 100 g 3    furosemide (LASIX) 40 MG tablet TAKE 1 TABLET BY MOUTH EVERY DAY 90 tablet 3    atorvastatin (LIPITOR) 20 MG tablet TAKE 1 TABLET BY MOUTH EVERY DAY 90 tablet 3    aspirin 81 MG tablet Take 81 mg by mouth daily      calcium carbonate (OSCAL) 500 MG TABS tablet Take 500 mg by mouth daily      Multiple Vitamins-Minerals (ALIVE ONCE DAILY WOMENS 50+ PO) Take by mouth      Ascorbic Acid (VITAMIN C) 250 MG tablet Take 250 mg by mouth daily      mometasone (ELOCON) 0.1 % cream Apply topically daily. (Patient not taking: Reported on 4/6/2020) 15 g 0     No current facility-administered medications for this visit. Allergies   Allergen Reactions    Seasonal        PHYSICAL EXAM:    /76 (Site: Left Upper Arm, Position: Sitting, Cuff Size: Large Adult)   Ht 5' 4\" (1.626 m)   Wt 202 lb 3.2 oz (91.7 kg)   LMP  (LMP Unknown)   BMI 34.71 kg/m²   Wt Readings from Last 3 Encounters:   04/09/21 202 lb 3.2 oz (91.7 kg)   02/24/21 201 lb 12.8 oz (91.5 kg)   12/14/20 200 lb (90.7 kg)     BP Readings from Last 3 Encounters:   04/09/21 132/76   02/24/21 132/72   12/14/20 132/80     General Appearance: in no acute distress, well developed, well nourished. Eyes: pupils equal, round reactive to light and accommodation. Ears: R normal canal and TM, L TM retracted  Nose: nares patent, no lesions. Oral Cavity: mucosa moist.  Throat: clear. Neck/Thyroid: neck supple, full range of motion, no cervical lymphadenopathy, no thyromegaly or carotid bruits. Skin: warm and dry. No suspicious lesions. Heart: regular rate and rhythm. No murmurs. S1, S2 normal, no gallops. Rate 70  Lungs: clear to auscultation bilaterally. Abdomen: bowel sounds present, soft, nontender, nondistended, no masses or organomegaly. Musculoskeletal: normal, full range of motion in knees and hips, no swelling or tenderness. Extremities: no cyanosis or edema. Peripheral Pulses: 2+ throughout, symetric. Neurologic: nonfocal, motor strength normal upper and lower extremities, sensory exam intact. Psych: normal affect, speech fluent. ASSESSMENT:   Diagnosis Orders   1. Hyperglycemia  Basic Metabolic Panel    Hemoglobin A1C   2.  Essential hypertension  CBC    ALT    AST 3. Hyperlipidemia, unspecified hyperlipidemia type  Lipid Panel   4. Aphthous stomatitis  ALT    AST   5. Class 1 obesity due to excess calories without serious comorbidity with body mass index (BMI) of 33.0 to 33.9 in adult     6. Pulmonary hypertension ([address]       PLAN:  She is able to tolerate the Colchicine once a day and states it has helped, I will increase this to twice a day and see if these are eliminated completely. Labs reviewed with no concerns    ECHO reviewed. I discuss different exercises she can work on at home. I will see her back in 4 months with labs prior. Orders Placed This Encounter   Procedures    CBC     Standing Status:   Future     Standing Expiration Date:   4/9/2022    Basic Metabolic Panel     Standing Status:   Future     Standing Expiration Date:   4/9/2022    ALT     Standing Status:   Future     Standing Expiration Date:   4/9/2022    Hemoglobin A1C     Standing Status:   Future     Standing Expiration Date:   4/9/2022    Lipid Panel     Standing Status:   Future     Standing Expiration Date:   4/9/2022     Order Specific Question:   Is Patient Fasting?/# of Hours     Answer:   0    AST     Standing Status:   Future     Standing Expiration Date:   4/9/2022     Orders Placed This Encounter   Medications    colchicine (COLCRYS) 0.6 MG tablet     Sig: Take 1 tablet by mouth 2 times daily     Dispense:  180 tablet     Refill:  3   I, Dr. Mohini Cordova, personally performed the services described in this documentation as scribed by FEDERICA Cordero in my presence, and is both accurate and complete.

## 2021-07-01 RX ORDER — MELOXICAM 15 MG/1
TABLET ORAL
Qty: 90 TABLET | Refills: 0 | Status: SHIPPED | OUTPATIENT
Start: 2021-07-01 | End: 2021-09-30

## 2021-07-01 NOTE — TELEPHONE ENCOUNTER
Health Maintenance   Topic Date Due    Shingles Vaccine (1 of 2) Never done    Pneumococcal 65+ years Vaccine (1 of 1 - PPSV23) Never done    DTaP/Tdap/Td vaccine (1 - Tdap) 08/23/2021 (Originally 12/9/1974)    Hepatitis C screen  08/23/2021 (Originally 1955)    Flu vaccine (1) 09/01/2021    Lipid screen  09/29/2021    A1C test (Diabetic or Prediabetic)  04/02/2022    Potassium monitoring  04/02/2022    Creatinine monitoring  04/02/2022    Breast cancer screen  12/03/2022    Colon cancer screen colonoscopy  08/13/2028    DEXA (modify frequency per FRAX score)  Completed    COVID-19 Vaccine  Completed    Hepatitis A vaccine  Aged Out    Hepatitis B vaccine  Aged Out    Hib vaccine  Aged Out    Meningococcal (ACWY) vaccine  Aged Out             (applicable per patient's age: Cancer Screenings, Depression Screening, Fall Risk Screening, Immunizations)    Hemoglobin A1C (%)   Date Value   04/02/2021 5.7   09/29/2020 6.0   11/29/2019 5.9     Microalb/Crt.  Ratio (mcg/mg creat)   Date Value   08/27/2014 6     LDL Cholesterol (mg/dL)   Date Value   09/29/2020 44     AST (U/L)   Date Value   04/02/2021 25     ALT (U/L)   Date Value   04/02/2021 42 (H)     BUN (mg/dL)   Date Value   04/02/2021 14      (goal A1C is < 7)   (goal LDL is <100) need 30-50% reduction from baseline     BP Readings from Last 3 Encounters:   04/09/21 132/76   02/24/21 132/72   12/14/20 132/80    (goal /80)      All Future Testing planned in [facility]:  Lab Frequency Next Occurrence   XR ABDOMEN (KUB) (SINGLE AP VIEW) Once 02/24/2022   CBC Once 54/74/9232   Basic Metabolic Panel Once 81/70/1790   ALT Once 08/09/2021   Hemoglobin A1C Once 08/09/2021   Lipid Panel Once 08/09/2021   AST Once 08/09/2021       Next Visit Date:  Future Appointments   Date Time Provider Brent Ward   8/10/2021  8:15 AM Dannielle Preston MD [facility]   2/22/2022 10:00 AM [facility]EE Vila [facility]            Patient Active Problem List:     Hyperglycemia     Sebaceous cyst     Palpitations     Other chronic pulmonary heart diseases     Obstructive sleep apnea     Other dyspnea and respiratory abnormality     Dysfunction of eustachian tube     Essential hypertension     Obesity     Other functional disorder of bladder     Allergic rhinitis     Symptomatic menopausal or female climacteric states     Joint pain     Congenital mitral insufficiency     Sleep apnea     MVP (mitral valve prolapse)     Uterine fibroid     Moderate mitral regurgitation     DDD (degenerative disc disease), cervical     Pulmonary hypertension (HCC)     Venous insufficiency     Left otitis media     Anxiety     Hyperlipidemia     Hearing loss     Osteopenia     Gross hematuria     Kidney stone     Sebaceous cyst of breast, left     Eczema     Otalgia     Infected sebaceous cyst     Furuncle     Tinea pedis     Inflammatory arthritis     Mild aortic stenosis     Osteoarthritis of knee     Aphthous ulcer     Adjustment disorder with depressed mood

## 2021-08-04 ENCOUNTER — HOSPITAL ENCOUNTER (OUTPATIENT)
Age: 66
Discharge: HOME | End: 2021-08-04
Payer: COMMERCIAL

## 2021-08-04 DIAGNOSIS — R73.9 HYPERGLYCEMIA: ICD-10-CM

## 2021-08-04 DIAGNOSIS — I10 ESSENTIAL HYPERTENSION: ICD-10-CM

## 2021-08-04 DIAGNOSIS — K12.0 APHTHOUS STOMATITIS: ICD-10-CM

## 2021-08-04 DIAGNOSIS — E78.5 HYPERLIPIDEMIA, UNSPECIFIED HYPERLIPIDEMIA TYPE: ICD-10-CM

## 2021-08-04 LAB
ALT SERPL-CCNC: 32 U/L (ref 5–33)
ANION GAP SERPL CALCULATED.3IONS-SCNC: 10 MMOL/L (ref 9–17)
AST SERPL-CCNC: 21 U/L
BUN BLDV-MCNC: 17 MG/DL (ref 8–23)
BUN/CREAT BLD: 21 (ref 9–20)
CALCIUM SERPL-MCNC: 9.4 MG/DL (ref 8.6–10.4)
CHLORIDE BLD-SCNC: 105 MMOL/L (ref 98–107)
CHOLESTEROL/HDL RATIO: 2.5
CHOLESTEROL: 116 MG/DL
CO2: 26 MMOL/L (ref 20–31)
CREAT SERPL-MCNC: 0.8 MG/DL (ref 0.5–0.9)
ESTIMATED AVERAGE GLUCOSE: 123 MG/DL
GFR AFRICAN AMERICAN: >60 ML/MIN
GFR NON-AFRICAN AMERICAN: >60 ML/MIN
GFR SERPL CREATININE-BSD FRML MDRD: ABNORMAL ML/MIN/{1.73_M2}
GFR SERPL CREATININE-BSD FRML MDRD: ABNORMAL ML/MIN/{1.73_M2}
GLUCOSE BLD-MCNC: 104 MG/DL (ref 70–99)
HBA1C MFR BLD: 5.9 % (ref 4–6)
HCT VFR BLD CALC: 41.9 % (ref 36.3–47.1)
HDLC SERPL-MCNC: 47 MG/DL
HEMOGLOBIN: 13.5 G/DL (ref 11.9–15.1)
LDL CHOLESTEROL: 54 MG/DL (ref 0–130)
MCH RBC QN AUTO: 29 PG (ref 25.2–33.5)
MCHC RBC AUTO-ENTMCNC: 32.2 G/DL (ref 28.4–34.8)
MCV RBC AUTO: 90.1 FL (ref 82.6–102.9)
NRBC AUTOMATED: 0 PER 100 WBC
PDW BLD-RTO: 12.2 % (ref 11.8–14.4)
PLATELET # BLD: 206 K/UL (ref 138–453)
PMV BLD AUTO: 9.7 FL (ref 8.1–13.5)
POTASSIUM SERPL-SCNC: 5.2 MMOL/L (ref 3.7–5.3)
RBC # BLD: 4.65 M/UL (ref 3.95–5.11)
SODIUM BLD-SCNC: 141 MMOL/L (ref 135–144)
TRIGL SERPL-MCNC: 77 MG/DL
VLDLC SERPL CALC-MCNC: NORMAL MG/DL (ref 1–30)
WBC # BLD: 6.7 K/UL (ref 3.5–11.3)

## 2021-08-04 PROCEDURE — 83036 HEMOGLOBIN GLYCOSYLATED A1C: CPT

## 2021-08-04 PROCEDURE — 85027 COMPLETE CBC AUTOMATED: CPT

## 2021-08-04 PROCEDURE — 84460 ALANINE AMINO (ALT) (SGPT): CPT

## 2021-08-04 PROCEDURE — 80048 BASIC METABOLIC PNL TOTAL CA: CPT

## 2021-08-04 PROCEDURE — 84450 TRANSFERASE (AST) (SGOT): CPT

## 2021-08-04 PROCEDURE — 36415 COLL VENOUS BLD VENIPUNCTURE: CPT

## 2021-08-04 PROCEDURE — 80061 LIPID PANEL: CPT

## 2021-08-10 ENCOUNTER — OFFICE VISIT (OUTPATIENT)
Dept: FAMILY MEDICINE CLINIC | Age: 66
End: 2021-08-10
Payer: COMMERCIAL

## 2021-08-10 VITALS
WEIGHT: 204.6 LBS | HEIGHT: 64 IN | BODY MASS INDEX: 34.93 KG/M2 | SYSTOLIC BLOOD PRESSURE: 128 MMHG | DIASTOLIC BLOOD PRESSURE: 76 MMHG | OXYGEN SATURATION: 99 %

## 2021-08-10 DIAGNOSIS — K12.0 APHTHOUS STOMATITIS: ICD-10-CM

## 2021-08-10 DIAGNOSIS — I10 ESSENTIAL HYPERTENSION: ICD-10-CM

## 2021-08-10 DIAGNOSIS — R73.9 HYPERGLYCEMIA: ICD-10-CM

## 2021-08-10 DIAGNOSIS — M19.90 INFLAMMATORY ARTHRITIS: ICD-10-CM

## 2021-08-10 DIAGNOSIS — H68.001 CATARRH OF RIGHT EUSTACHIAN TUBE: ICD-10-CM

## 2021-08-10 DIAGNOSIS — H65.92 OTHER NONSUPPURATIVE OTITIS MEDIA OF LEFT EAR, UNSPECIFIED CHRONICITY: ICD-10-CM

## 2021-08-10 DIAGNOSIS — M17.0 OSTEOARTHRITIS OF BOTH KNEES, UNSPECIFIED OSTEOARTHRITIS TYPE: ICD-10-CM

## 2021-08-10 DIAGNOSIS — R42 DIZZINESS: ICD-10-CM

## 2021-08-10 DIAGNOSIS — E78.5 HYPERLIPIDEMIA, UNSPECIFIED HYPERLIPIDEMIA TYPE: ICD-10-CM

## 2021-08-10 DIAGNOSIS — K21.9 GASTROESOPHAGEAL REFLUX DISEASE, UNSPECIFIED WHETHER ESOPHAGITIS PRESENT: Primary | ICD-10-CM

## 2021-08-10 PROBLEM — F43.21 ADJUSTMENT DISORDER WITH DEPRESSED MOOD: Status: RESOLVED | Noted: 2020-04-06 | Resolved: 2021-08-10

## 2021-08-10 PROBLEM — R31.0 GROSS HEMATURIA: Status: RESOLVED | Noted: 2018-08-06 | Resolved: 2021-08-10

## 2021-08-10 PROBLEM — B35.3 TINEA PEDIS: Status: RESOLVED | Noted: 2019-06-12 | Resolved: 2021-08-10

## 2021-08-10 PROBLEM — H92.09 OTALGIA: Status: RESOLVED | Noted: 2019-03-18 | Resolved: 2021-08-10

## 2021-08-10 PROBLEM — N60.82 SEBACEOUS CYST OF BREAST, LEFT: Status: RESOLVED | Noted: 2018-10-09 | Resolved: 2021-08-10

## 2021-08-10 PROBLEM — L72.3 INFECTED SEBACEOUS CYST: Status: RESOLVED | Noted: 2019-03-18 | Resolved: 2021-08-10

## 2021-08-10 PROBLEM — L02.92 FURUNCLE: Status: RESOLVED | Noted: 2019-03-18 | Resolved: 2021-08-10

## 2021-08-10 PROBLEM — L08.9 INFECTED SEBACEOUS CYST: Status: RESOLVED | Noted: 2019-03-18 | Resolved: 2021-08-10

## 2021-08-10 PROCEDURE — 99214 OFFICE O/P EST MOD 30 MIN: CPT | Performed by: FAMILY MEDICINE

## 2021-08-10 RX ORDER — PANTOPRAZOLE SODIUM 40 MG/1
40 TABLET, DELAYED RELEASE ORAL
Qty: 90 TABLET | Refills: 1 | Status: SHIPPED | OUTPATIENT
Start: 2021-08-10 | End: 2022-02-01

## 2021-08-10 NOTE — PROGRESS NOTES
I, Norma Farias, [facility], am scribing for and in the presence of Dr. Seth Cunningham. 8/10/21/8:40/CRF    [address] [address]  [place] [phone]  Dept: [phone]    Juanita Manning is a 76 y.o. female here for Follow-up, Hypertension, Hyperglycemia, and Hyperlipidemia    pt has had abdominal pain lately after eating that she would like to discuss    HPI:  HYPERTENSION:  She is exercising (walking). She is adherent to a low-salt diet.    Blood pressure is not being monitored at home.        HYPERGLYCEMIA:  Diet compliance: compliant most of the time  Current exercise: walking     HYPERLIPIDEMIA:     Medication compliance: compliant all of the time. Patient is  following a low fat, low cholesterol diet.    She is exercising regularly, walking and stretches at home  Prior to Admission medications    Medication Sig Start Date End Date Taking?  Authorizing Provider   pantoprazole (PROTONIX) 40 MG tablet Take 1 tablet by mouth every morning (before breakfast) 8/10/21  Yes Seth Cunningham MD   meloxicam (MOBIC) 15 MG tablet TAKE 1 TABLET BY MOUTH EVERY DAY 7/1/21  Yes Seth Cunningham MD   colchicine (COLCRYS) 0.6 MG tablet Take 1 tablet by mouth 2 times daily 4/9/21  Yes Seth Cunningham MD   diclofenac sodium (VOLTAREN) 1 % GEL APPLY 2 G TOPICALLY 2 TIMES DAILY 2/1/21  Yes Seth Cunningham MD   furosemide (LASIX) 40 MG tablet TAKE 1 TABLET BY MOUTH EVERY DAY 9/25/20  Yes Seth Cunningham MD   atorvastatin (LIPITOR) 20 MG tablet TAKE 1 TABLET BY MOUTH EVERY DAY 9/17/20  Yes Seth Cunningham MD   aspirin 81 MG tablet Take 81 mg by mouth daily   Yes Historical Provider, MD   calcium carbonate (OSCAL) 500 MG TABS tablet Take 500 mg by mouth daily   Yes Historical Provider, MD   Multiple Vitamins-Minerals (ALIVE ONCE DAILY WOMENS 50+ PO) Take by mouth   Yes Historical Provider, MD   Ascorbic Acid (VITAMIN C) 250 MG tablet Take 250 mg by mouth daily   Yes Historical Provider, MD ROS:  General Constitutional: Denies chills. Denies fever. admits headache. Denies lightheadedness. Ophthalmologic: Denies blurred vision. ENT: Denies nasal congestion. admtis throat tenders. Admits R ear pressure. Respiratory: Denies cough. Denies shortness of breath. Denies wheezing. Cardiovascular: Denies chest pain at rest. Denies irregular heartbeat. Denies palpitations. Gastrointestinal: Admits abdominal pain after eating,. admits blood in the stool believes its from Hemorid . Denies constipation. Denies diarrhea. Denies nausea. Denies vomiting. Genitourinary: Denies blood in the urine. Denies difficulty urinating. Denies frequent urination. Denies painful urination. Denies urinary incontinence. Musculoskeletal: Denies muscle aches. Denies painful joints. Denies swollen joints. Peripheral Vascular: Denies pain/cramping in legs after exertion. Skin: Denies dry skin. Denies itching. Denies rash. Neurologic: Denies falls. admits dizziness. Denies fainting. Denies tingling/numbness. Psychiatric: Denies sleep disturbance. Denies anxiety. Denies depressed mood.     Past Surgical History:   Procedure Laterality Date    ABDOMEN SURGERY  10/12/2018    pelvic mass removed     COLONOSCOPY  08/13/2018    -polyp(benign lymphoid)hemorrhoids    COLONOSCOPY N/A 8/13/2018    COLONOSCOPY POLYPECTOMY SNARE/COLD BIOPSY performed by Elvia Tomas MD at [facility]  2008    Dr. Rio Pineda  2009    HYSTERECTOMY  2009    supracervical, BSO with cervical conization, Dr. Gisel Daniels [facility] LITHOTRIPSY Left     [facility] ESWL Left 12/4/2018    ESWL [address] LITHOTRIPSY performed by Moustapha Huerta MD at [facility] History   Problem Relation Age of Onset    High Blood Pressure Father     Heart Disease Father     Stroke Father     Dementia Mother     Other Mother         thoracic AAA    Cancer Mother         bilateral kidney       Past Medical History:   Diagnosis Date    Allergic rhinitis 1992    Aphthous ulcer of mouth     DDD (degenerative disc disease), cervical 2013    H/O echocardiogram 08/11/2015    EF 65%. Aortic valve sclerosis without stenosis. Mild aortic regurgitation. Myxomatous thickening of the mitral leaflets with Moderate mitral regurgitation. Mils (grade l) diastolic dysfunction.  H/O echocardiogram 09/02/2016    EF >60%. Normal LV wall thickness and cavity size. No definite specific wall motion abnormalities were identified. Left atrium is mildly dilated (29-33) left atrial volume index of 31 ml/m2. Mild aortic stenosis. Myxomatous thickening of the mitral leaflets with moderate eccentric mitral regurg. Mild treicuspid regurg. Mild diastolic dysfunction is seen.  H/O echocardiogram 08/06/2018    EF >55%. The LV wall thickness is mildly incrased. Moderate mitral regurg. Mild aortic stenosis. Mild aortic regurg. Mild tricuspid regurg. Evidence of mild (grade I) diastolic dysfucntion is seen.      Hyperglycemia 2013    Hyperlipidemia     Hypertension     Mitral regurgitation 2010    MVP (mitral valve prolapse) 2010    Obesity 2010    Pulmonary hypertension ([address] 2013    Sleep apnea 2012    Uterine fibroid 2008    Venous insufficiency 2013    Wears glasses       Social History     Tobacco Use    Smoking status: Never Smoker    Smokeless tobacco: Never Used   Substance Use Topics    Alcohol use: No      Current Outpatient Medications   Medication Sig Dispense Refill    pantoprazole (PROTONIX) 40 MG tablet Take 1 tablet by mouth every morning (before breakfast) 90 tablet 1    meloxicam (MOBIC) 15 MG tablet TAKE 1 TABLET BY MOUTH EVERY DAY 90 tablet 0    colchicine (COLCRYS) 0.6 MG tablet Take 1 tablet by mouth 2 times daily 180 tablet 3    diclofenac sodium (VOLTAREN) 1 % GEL APPLY 2 G TOPICALLY 2 TIMES DAILY 100 g 3    furosemide (LASIX) 40 MG tablet TAKE 1 TABLET BY MOUTH EVERY DAY 90 tablet 3    atorvastatin (LIPITOR) 20 MG tablet TAKE 1 TABLET BY MOUTH EVERY DAY 90 tablet 3    aspirin 81 MG tablet Take 81 mg by mouth daily      calcium carbonate (OSCAL) 500 MG TABS tablet Take 500 mg by mouth daily      Multiple Vitamins-Minerals (ALIVE ONCE DAILY WOMENS 50+ PO) Take by mouth      Ascorbic Acid (VITAMIN C) 250 MG tablet Take 250 mg by mouth daily       No current facility-administered medications for this visit. Allergies   Allergen Reactions    Seasonal        PHYSICAL EXAM:    /76   Ht 5' 4\" (1.626 m)   Wt 204 lb 9.6 oz (92.8 kg)   LMP  (LMP Unknown)   SpO2 99%   BMI 35.12 kg/m²   Wt Readings from Last 3 Encounters:   08/10/21 204 lb 9.6 oz (92.8 kg)   04/09/21 202 lb 3.2 oz (91.7 kg)   02/24/21 201 lb 12.8 oz (91.5 kg)     BP Readings from Last 3 Encounters:   08/10/21 128/76   04/09/21 132/76   02/24/21 132/72       General Appearance: in no acute distress, well developed, well nourished. Eyes: pupils equal, round reactive to light and accommodation. Ears: normal canal and TM's. R scarring on TM  Nose: nares patent, no lesions. Oral Cavity: mucosa moist.  Throat: clear. Neck/Thyroid: neck supple, full range of motion, no cervical lymphadenopathy, no thyromegaly or carotid bruits. Skin: warm and dry. No suspicious lesions. Heart: regular rate and rhythm. No murmurs. S1, S2 normal, no gallops. Blowing systolic murmur rate 75  Lungs: clear to auscultation bilaterally. Abdomen: bowel sounds present, soft, nontender, nondistended, no masses or organomegaly. Tender upper gastricum  Musculoskeletal: normal, full range of motion in knees and hips, no swelling or tenderness. Extremities: no cyanosis or edema. Peripheral Pulses: 2+ throughout, symetric. Neurologic: nonfocal, motor strength normal upper and lower extremities, sensory exam intact. Psych: normal affect, speech fluent. ASSESSMENT:   Diagnosis Orders   1. Gastroesophageal reflux disease, unspecified whether esophagitis present  pantoprazole (PROTONIX) 40 MG tablet   2. Hyperglycemia  pantoprazole (PROTONIX) 40 MG tablet    ALT    AST    Basic Metabolic Panel    CBC Auto Differential    Hemoglobin A1C   3. Essential hypertension  pantoprazole (PROTONIX) 40 MG tablet    ALT    AST    Basic Metabolic Panel    CBC Auto Differential    Hemoglobin A1C   4. Hyperlipidemia, unspecified hyperlipidemia type  pantoprazole (PROTONIX) 40 MG tablet    ALT    AST    Basic Metabolic Panel    CBC Auto Differential    Hemoglobin A1C   5. Dizziness  Tympanometry   6. Aphthous stomatitis     7. Catarrh of right eustachian tube     8. Inflammatory arthritis     9. Osteoarthritis of both knees, unspecified osteoarthritis type     10. Other nonsuppurative otitis media of left ear, unspecified chronicity         PLAN:  We discuss her abdominal pain after eating I suspect this be caused by a medication side effect, I would like her to discontinue the Meloxicam and Colchicine. I would like her to start Protonix 40 mg. If this is successful and her abdomen pain subsides we can slowly reintroduce the Meloxicam and Colchicine. If the pain remains I will do further testing. We discuss her labs and her A1C is 5.9, I would like her to focus on the Glycemic index diet and use the book I have provided previously as a reference and guide to remaining on a good healthy diet and continue daily exercise. 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 I will perform a tympanometry to check for fluid behind the R ear as she has has pressure and dizziness. I would like her to call the office in 2-3 weeks with an update on the abdominal pain and ear pressure and dizziness. I will see her back in 4 months with labs prior.              Orders Placed This Encounter   Procedures    ALT     Standing Status:   Future Standing Expiration Date:   8/10/2022    AST     Standing Status:   Future     Standing Expiration Date:   8/10/2022    Basic Metabolic Panel     Standing Status:   Future     Standing Expiration Date:   8/10/2022    CBC Auto Differential     Standing Status:   Future     Standing Expiration Date:   8/10/2022    Hemoglobin A1C     Standing Status:   Future     Standing Expiration Date:   8/10/2022    Tympanometry     Standing Status:   Future     Standing Expiration Date:   8/10/2022     Orders Placed This Encounter   Medications    pantoprazole (PROTONIX) 40 MG tablet     Sig: Take 1 tablet by mouth every morning (before breakfast)     Dispense:  90 tablet     Refill:  1   I, Dr. Lisette Leblanc, personally performed the services described in this documentation as scribed by GRACIELA Flores in my presence, and is both accurate and complete.

## 2021-08-10 NOTE — PATIENT INSTRUCTIONS
PLAN:  We discuss her abdominal pain after eating I suspect this be caused by a medication side effect, I would like her to discontinue the Meloxicam and Colchicine. I would like her to start Protonix 40 mg. If this is successful and her abdomen pain subsides we can slowly reintroduce the Meloxicam and Colchicine. If the pain remains I will do further testing. We discuss her labs and her A1C is 5.9, I would like her to focus on the Glycemic index diet and use the book I have provided previously as a reference and guide to remaining on a good healthy diet and continue daily exercise. 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 I will perform a tympanometry to check for fluid behind the R ear as she has has pressure and dizziness. I would like her to call the office in 2-3 weeks with an update on the abdominal pain and ear pressure and dizziness. I will see her back in 4 months with labs prior. SURVEY:    You may be receiving a survey from [employer] regarding your visit today. Please complete the survey to enable us to provide the highest quality of care to you and your family. If you cannot score us a very good on any question, please call the office to discuss how we could of made your experience a very good one. Thank you.

## 2021-09-13 RX ORDER — ATORVASTATIN CALCIUM 20 MG/1
TABLET, FILM COATED ORAL
Qty: 90 TABLET | Refills: 3 | Status: SHIPPED | OUTPATIENT
Start: 2021-09-13 | End: 2022-09-02

## 2021-09-13 RX ORDER — FUROSEMIDE 40 MG/1
TABLET ORAL
Qty: 90 TABLET | Refills: 3 | Status: SHIPPED | OUTPATIENT
Start: 2021-09-13 | End: 2022-09-02

## 2021-09-30 RX ORDER — MELOXICAM 15 MG/1
TABLET ORAL
Qty: 90 TABLET | Refills: 1 | Status: SHIPPED | OUTPATIENT
Start: 2021-09-30 | End: 2022-09-09

## 2021-09-30 NOTE — TELEPHONE ENCOUNTER
Health Maintenance   Topic Date Due    Hepatitis C screen  Never done    DTaP/Tdap/Td vaccine (1 - Tdap) Never done    Flu vaccine (1) 09/01/2021    Pneumococcal 65+ years Vaccine (1 of 1 - PPSV23) 02/10/2022 (Originally 12/9/2019)    Shingles Vaccine (1 of 2) 08/10/2022 (Originally 12/9/2004)    A1C test (Diabetic or Prediabetic)  08/04/2022    Lipid screen  08/04/2022    Potassium monitoring  08/04/2022    Creatinine monitoring  08/04/2022    Breast cancer screen  12/03/2022    Colon cancer screen colonoscopy  08/13/2028    DEXA (modify frequency per FRAX score)  Completed    COVID-19 Vaccine  Completed    Hepatitis A vaccine  Aged Out    Hepatitis B vaccine  Aged Out    Hib vaccine  Aged Out    Meningococcal (ACWY) vaccine  Aged Out             (applicable per patient's age: Cancer Screenings, Depression Screening, Fall Risk Screening, Immunizations)    Hemoglobin A1C (%)   Date Value   08/04/2021 5.9   04/02/2021 5.7   09/29/2020 6.0     Microalb/Crt.  Ratio (mcg/mg creat)   Date Value   08/27/2014 6     LDL Cholesterol (mg/dL)   Date Value   08/04/2021 54     AST (U/L)   Date Value   08/04/2021 21     ALT (U/L)   Date Value   08/04/2021 32     BUN (mg/dL)   Date Value   08/04/2021 17      (goal A1C is < 7)   (goal LDL is <100) need 30-50% reduction from baseline     BP Readings from Last 3 Encounters:   08/10/21 128/76   04/09/21 132/76   02/24/21 132/72    (goal /80)      All Future Testing planned in [facility]:  Lab Frequency Next Occurrence   XR ABDOMEN (KUB) (SINGLE AP VIEW) Once 02/24/2022   ALT Once 12/10/2021   AST Once 46/84/6984   Basic Metabolic Panel Once 91/09/0230   CBC Auto Differential Once 12/10/2021   Hemoglobin A1C Once 12/10/2021   Tympanometry Once 08/10/2021       Next Visit Date:  Future Appointments   Date Time Provider Brent Ward   12/15/2021  3:30 PM Cami Lin MD [facility]   2/22/2022 10:00 AM [facility] Patient Active Problem List:     Hyperglycemia     Other chronic pulmonary heart diseases     Obstructive sleep apnea     Other dyspnea and respiratory abnormality     Essential hypertension     Obesity     Other functional disorder of bladder     Allergic rhinitis     Symptomatic menopausal or female climacteric states     Congenital mitral insufficiency     Sleep apnea     MVP (mitral valve prolapse)     Uterine fibroid     Moderate mitral regurgitation     DDD (degenerative disc disease), cervical     Pulmonary hypertension (HCC)     Venous insufficiency     Anxiety     Hyperlipidemia     Hearing loss     Osteopenia     Kidney stone     Eczema     Inflammatory arthritis     Mild aortic stenosis     Osteoarthritis of knee     Aphthous ulcer

## 2021-12-07 DIAGNOSIS — M17.11 PRIMARY OSTEOARTHRITIS OF RIGHT KNEE: ICD-10-CM

## 2021-12-08 ENCOUNTER — HOSPITAL ENCOUNTER (OUTPATIENT)
Age: 66
Discharge: HOME | End: 2021-12-08
Payer: COMMERCIAL

## 2021-12-08 DIAGNOSIS — E78.5 HYPERLIPIDEMIA, UNSPECIFIED HYPERLIPIDEMIA TYPE: ICD-10-CM

## 2021-12-08 DIAGNOSIS — R73.9 HYPERGLYCEMIA: ICD-10-CM

## 2021-12-08 DIAGNOSIS — I10 ESSENTIAL HYPERTENSION: ICD-10-CM

## 2021-12-08 LAB
ABSOLUTE EOS #: 0.1 K/UL (ref 0–0.44)
ABSOLUTE IMMATURE GRANULOCYTE: <0.03 K/UL (ref 0–0.3)
ABSOLUTE LYMPH #: 2.06 K/UL (ref 1.1–3.7)
ABSOLUTE MONO #: 0.69 K/UL (ref 0.1–1.2)
ALT SERPL-CCNC: 33 U/L (ref 5–33)
ANION GAP SERPL CALCULATED.3IONS-SCNC: 12 MMOL/L (ref 9–17)
AST SERPL-CCNC: 24 U/L
BASOPHILS # BLD: 0 % (ref 0–2)
BASOPHILS ABSOLUTE: <0.03 K/UL (ref 0–0.2)
BUN BLDV-MCNC: 14 MG/DL (ref 8–23)
BUN/CREAT BLD: 17 (ref 9–20)
CALCIUM SERPL-MCNC: 9.6 MG/DL (ref 8.6–10.4)
CHLORIDE BLD-SCNC: 103 MMOL/L (ref 98–107)
CO2: 27 MMOL/L (ref 20–31)
CREAT SERPL-MCNC: 0.81 MG/DL (ref 0.5–0.9)
DIFFERENTIAL TYPE: NORMAL
EOSINOPHILS RELATIVE PERCENT: 2 % (ref 1–4)
ESTIMATED AVERAGE GLUCOSE: 131 MG/DL
GFR AFRICAN AMERICAN: >60 ML/MIN
GFR NON-AFRICAN AMERICAN: >60 ML/MIN
GFR SERPL CREATININE-BSD FRML MDRD: ABNORMAL ML/MIN/{1.73_M2}
GFR SERPL CREATININE-BSD FRML MDRD: ABNORMAL ML/MIN/{1.73_M2}
GLUCOSE BLD-MCNC: 102 MG/DL (ref 70–99)
HBA1C MFR BLD: 6.2 % (ref 4–6)
HCT VFR BLD CALC: 44.1 % (ref 36.3–47.1)
HEMOGLOBIN: 13.6 G/DL (ref 11.9–15.1)
IMMATURE GRANULOCYTES: 0 %
LYMPHOCYTES # BLD: 34 % (ref 24–43)
MCH RBC QN AUTO: 27.9 PG (ref 25.2–33.5)
MCHC RBC AUTO-ENTMCNC: 30.8 G/DL (ref 28.4–34.8)
MCV RBC AUTO: 90.4 FL (ref 82.6–102.9)
MONOCYTES # BLD: 12 % (ref 3–12)
NRBC AUTOMATED: 0 PER 100 WBC
PDW BLD-RTO: 13 % (ref 11.8–14.4)
PLATELET # BLD: 215 K/UL (ref 138–453)
PLATELET ESTIMATE: NORMAL
PMV BLD AUTO: 9.9 FL (ref 8.1–13.5)
POTASSIUM SERPL-SCNC: 4.5 MMOL/L (ref 3.7–5.3)
RBC # BLD: 4.88 M/UL (ref 3.95–5.11)
RBC # BLD: NORMAL 10*6/UL
SEG NEUTROPHILS: 52 % (ref 36–65)
SEGMENTED NEUTROPHILS ABSOLUTE COUNT: 3.11 K/UL (ref 1.5–8.1)
SODIUM BLD-SCNC: 142 MMOL/L (ref 135–144)
WBC # BLD: 6 K/UL (ref 3.5–11.3)
WBC # BLD: NORMAL 10*3/UL

## 2021-12-08 PROCEDURE — 84450 TRANSFERASE (AST) (SGOT): CPT

## 2021-12-08 PROCEDURE — 80048 BASIC METABOLIC PNL TOTAL CA: CPT

## 2021-12-08 PROCEDURE — 36415 COLL VENOUS BLD VENIPUNCTURE: CPT

## 2021-12-08 PROCEDURE — 84460 ALANINE AMINO (ALT) (SGPT): CPT

## 2021-12-08 PROCEDURE — 83036 HEMOGLOBIN GLYCOSYLATED A1C: CPT

## 2021-12-08 PROCEDURE — 85025 COMPLETE CBC W/AUTO DIFF WBC: CPT

## 2021-12-15 ENCOUNTER — OFFICE VISIT (OUTPATIENT)
Dept: FAMILY MEDICINE CLINIC | Age: 66
End: 2021-12-15
Payer: COMMERCIAL

## 2021-12-15 VITALS
HEIGHT: 64 IN | OXYGEN SATURATION: 97 % | DIASTOLIC BLOOD PRESSURE: 78 MMHG | HEART RATE: 73 BPM | BODY MASS INDEX: 34.72 KG/M2 | SYSTOLIC BLOOD PRESSURE: 134 MMHG | WEIGHT: 203.4 LBS

## 2021-12-15 DIAGNOSIS — R73.9 HYPERGLYCEMIA: ICD-10-CM

## 2021-12-15 DIAGNOSIS — E78.5 HYPERLIPIDEMIA, UNSPECIFIED HYPERLIPIDEMIA TYPE: ICD-10-CM

## 2021-12-15 DIAGNOSIS — I34.0 MODERATE MITRAL REGURGITATION: ICD-10-CM

## 2021-12-15 DIAGNOSIS — K21.9 GASTROESOPHAGEAL REFLUX DISEASE, UNSPECIFIED WHETHER ESOPHAGITIS PRESENT: ICD-10-CM

## 2021-12-15 DIAGNOSIS — E66.09 CLASS 1 OBESITY DUE TO EXCESS CALORIES WITHOUT SERIOUS COMORBIDITY WITH BODY MASS INDEX (BMI) OF 33.0 TO 33.9 IN ADULT: ICD-10-CM

## 2021-12-15 DIAGNOSIS — I10 ESSENTIAL HYPERTENSION: ICD-10-CM

## 2021-12-15 DIAGNOSIS — R06.02 SOB (SHORTNESS OF BREATH): Primary | ICD-10-CM

## 2021-12-15 DIAGNOSIS — R53.83 FATIGUE, UNSPECIFIED TYPE: ICD-10-CM

## 2021-12-15 PROCEDURE — 99214 OFFICE O/P EST MOD 30 MIN: CPT | Performed by: FAMILY MEDICINE

## 2021-12-15 RX ORDER — CELECOXIB 200 MG/1
200 CAPSULE ORAL DAILY
Qty: 60 CAPSULE | Refills: 3 | Status: SHIPPED | OUTPATIENT
Start: 2021-12-15 | End: 2022-04-29 | Stop reason: ALTCHOICE

## 2021-12-15 NOTE — PROGRESS NOTES
TIERRA Escamilla, am scribing for and in the presence of Dr. Rebel Flor. 12/15/21/3:45pm/SNP    [address] [address]  [place] [id number]  Dept: [phone]    Marilou Hill is a 78 y.o. female here for Follow-up (4 Month), Hypertension, Hyperglycemia, and Hyperlipidemia    HPI:  HYPERTENSION:  She is not exercising. She is adherent to a low-salt diet.    Blood pressure is not being monitored at home.        HYPERGLYCEMIA:  Diet compliance: compliant most of the time  Current exercise: none     HYPERLIPIDEMIA:     Medication compliance: compliant all of the time. Patient is following a low fat, low cholesterol diet.    She is not exercising regularly. Prior to Admission medications    Medication Sig Start Date End Date Taking?  Authorizing Provider   celecoxib (CELEBREX) 200 MG capsule Take 1 capsule by mouth daily 12/15/21  Yes Rebel Flor MD   diclofenac sodium (VOLTAREN) 1 % GEL APPLY 2G TOPICALLY 2 TIMES DAILY 12/7/21  Yes Rebel Flor MD   atorvastatin (LIPITOR) 20 MG tablet TAKE 1 TABLET BY MOUTH EVERY DAY 9/13/21  Yes Rebel Flro MD   furosemide (LASIX) 40 MG tablet TAKE 1 TABLET BY MOUTH EVERY DAY 9/13/21  Yes Rebel Flor MD   pantoprazole (PROTONIX) 40 MG tablet Take 1 tablet by mouth every morning (before breakfast) 8/10/21  Yes Rebel Flor MD   aspirin 81 MG tablet Take 81 mg by mouth daily   Yes Historical Provider, MD   calcium carbonate (OSCAL) 500 MG TABS tablet Take 500 mg by mouth daily   Yes Historical Provider, MD   Multiple Vitamins-Minerals (ALIVE ONCE DAILY WOMENS 50+ PO) Take by mouth   Yes Historical Provider, MD   Ascorbic Acid (VITAMIN C) 250 MG tablet Take 250 mg by mouth daily   Yes Historical Provider, MD   meloxicam (MOBIC) 15 MG tablet TAKE 1 TABLET BY MOUTH EVERY DAY  Patient not taking: Reported on 12/15/2021 9/30/21   Rebel Flor MD   colchicine (COLCRYS) 0.6 MG tablet Take 1 tablet by mouth 2 times daily  Patient not taking: Reported on 12/15/2021 4/9/21   Pam Kinney MD     ROS:  General Constitutional: Denies chills. Denies fever. Denies headache. Denies lightheadedness. Admits feeling sluggish since having covid  Ophthalmologic: Denies blurred vision. ENT: Denies nasal congestion. Denies sore throat. Denies ear pain and pressure. Respiratory: Denies cough. Denies wheezing. Admits SOB with and without exertion since having covid  Cardiovascular: Denies chest pain at rest. Denies irregular heartbeat. Denies palpitations. Gastrointestinal: Denies abdominal pain. Denies blood in the stool. Denies constipation. Denies diarrhea. Denies nausea. Denies vomiting. Genitourinary: Denies blood in the urine. Denies difficulty urinating. Denies frequent urination. Denies painful urination. Denies urinary incontinence. Musculoskeletal: Denies muscle aches. Denies painful joints. Denies swollen joints. Peripheral Vascular: Denies pain/cramping in legs after exertion. Skin: Denies dry skin. Denies itching. Denies rash. Neurologic: Denies falls. Denies dizziness. Denies fainting. Denies tingling/numbness. Psychiatric: Denies sleep disturbance. Denies anxiety. Denies depressed mood.     Past Surgical History:   Procedure Laterality Date    ABDOMEN SURGERY  10/12/2018    pelvic mass removed     COLONOSCOPY  08/13/2018    -polyp(benign lymphoid)hemorrhoids    COLONOSCOPY N/A 8/13/2018    COLONOSCOPY POLYPECTOMY SNARE/COLD BIOPSY performed by Calli Trujillo MD at [address] [place] [address]  2008    Dr. Eliceo Narayanan  2009    HYSTERECTOMY  2009    supracervical, BSO with cervical conization, Dr. Dalila Thompson LITHOTRIPSY Left     Darrell Reyez ESWL Left 12/4/2018    ESWL [address] LITHOTRIPSY performed by Em Ballard MD at [facility]       Family History   Problem Relation Age of Onset    High Blood Pressure Father  Heart Disease Father     Stroke Father     Dementia Mother     Other Mother         thoracic AAA    Cancer Mother         bilateral kidney       Past Medical History:   Diagnosis Date    Allergic rhinitis 1992    Aphthous ulcer of mouth     DDD (degenerative disc disease), cervical 2013    H/O echocardiogram 08/11/2015    EF 65%. Aortic valve sclerosis without stenosis. Mild aortic regurgitation. Myxomatous thickening of the mitral leaflets with Moderate mitral regurgitation. Mils (grade l) diastolic dysfunction.  H/O echocardiogram 09/02/2016    EF >60%. Normal LV wall thickness and cavity size. No definite specific wall motion abnormalities were identified. Left atrium is mildly dilated (29-33) left atrial volume index of 31 ml/m2. Mild aortic stenosis. Myxomatous thickening of the mitral leaflets with moderate eccentric mitral regurg. Mild treicuspid regurg. Mild diastolic dysfunction is seen.  H/O echocardiogram 08/06/2018    EF >55%. The LV wall thickness is mildly incrased. Moderate mitral regurg. Mild aortic stenosis. Mild aortic regurg. Mild tricuspid regurg. Evidence of mild (grade I) diastolic dysfucntion is seen.      Hyperglycemia 2013    Hyperlipidemia     Hypertension     Mitral regurgitation 2010    MVP (mitral valve prolapse) 2010    Obesity 2010    Pulmonary hypertension ([address] 2013    Sleep apnea 2012    Uterine fibroid 2008    Venous insufficiency 2013    Wears glasses       Social History     Tobacco Use    Smoking status: Never Smoker    Smokeless tobacco: Never Used   Substance Use Topics    Alcohol use: No      Current Outpatient Medications   Medication Sig Dispense Refill    celecoxib (CELEBREX) 200 MG capsule Take 1 capsule by mouth daily 60 capsule 3    diclofenac sodium (VOLTAREN) 1 % GEL APPLY 2G TOPICALLY 2 TIMES DAILY 100 g 3    atorvastatin (LIPITOR) 20 MG tablet TAKE 1 TABLET BY MOUTH EVERY DAY 90 tablet 3    furosemide (LASIX) 40 MG tablet TAKE 1 motion in knees and hips, no swelling or tenderness. Extremities: no cyanosis, trace edema pre tibial  Peripheral Pulses: 2+ throughout, symetric. Neurologic: nonfocal, motor strength normal upper and lower extremities, sensory exam intact. Psych: normal affect, speech fluent. ASSESSMENT:   Diagnosis Orders   1. SOB (shortness of breath)  ECHO Complete 2D W Doppler W Color   2. Moderate mitral regurgitation  ECHO Complete 2D W Doppler W Color   3. Gastroesophageal reflux disease, unspecified whether esophagitis present     4. Hyperglycemia  Hemoglobin O8K    Basic Metabolic Panel   5. Essential hypertension  CBC Auto Differential    AST    ALT   6. Hyperlipidemia, unspecified hyperlipidemia type  CBC Auto Differential    CRP,High Sensitivity   7. Fatigue, unspecified type  ECHO Complete 2D W Doppler W Color   8. Class 1 obesity due to excess calories without serious comorbidity with body mass index (BMI) of 33.0 to 33.9 in adult       PLAN:  She is retiring within the next week. We discuss that once she is retired her full time job should be focused on her health. I suggest she spends 2 hours/week on health related things, such as exercising, grocery shopping, food prep, etc.    I also discuss with her avoiding simple carbs in her diet such as white breads, pasta, potatoes etc.     I will get an ECHO due to the shortness of breath and the murmur heard on today's exam.     For the joint pain, I will start 200 mg Celebrex once a day to reduce the risk of causing upset stomach. I will see her back in 4 months for an Intro to Medicare.      Orders Placed This Encounter   Procedures    Hemoglobin A1C     Standing Status:   Future     Standing Expiration Date:   12/15/2022    CBC Auto Differential     Standing Status:   Future     Standing Expiration Date:   12/15/2022    Basic Metabolic Panel     Standing Status:   Future     Standing Expiration Date:   12/15/2022    AST     Standing Status:   Future Standing Expiration Date:   12/15/2022    ALT     Standing Status:   Future     Standing Expiration Date:   12/15/2022    CRP,High Sensitivity     Standing Status:   Future     Standing Expiration Date:   12/15/2022    ECHO Complete 2D W Doppler W Color     Standing Status:   Future     Standing Expiration Date:   12/15/2022     Order Specific Question:   Reason for exam:     Answer:   moderate mitral valve regurgitation     Orders Placed This Encounter   Medications    celecoxib (CELEBREX) 200 MG capsule     Sig: Take 1 capsule by mouth daily     Dispense:  60 capsule     Refill:  3   .I, Dr. Sindhu Milligan, personally performed the services described in this documentation as scribed/transcribed by FEDERICA Xiong in my presence, and is both accurate and complete.

## 2021-12-15 NOTE — PATIENT INSTRUCTIONS
SURVEY:    You may be receiving a survey from [facility] regarding your visit today. Please complete the survey to enable us to provide the highest quality of care to you and your family. If you cannot score us a very good on any question, please call the office to discuss how we could of made your experience a very good one. Thank you. PLAN:  She is retiring within the next week. We discuss that once she is retired her full time job should be focused on her health. I suggest she spends 2 hours/week on health related things, such as exercising, grocery shopping, food prep, etc.    I also discuss with her avoiding simple carbs in her diet such as white breads, pasta, potatoes etc.     I will get an ECHO due to the shortness of breath and the murmur heard on today's exam.     For the joint pain, I will start 200 mg Celebrex once a day to reduce the risk of causing upset stomach. I will see her back in 4 months for an Intro to Medicare.

## 2022-02-01 DIAGNOSIS — I10 ESSENTIAL HYPERTENSION: ICD-10-CM

## 2022-02-01 DIAGNOSIS — R73.9 HYPERGLYCEMIA: ICD-10-CM

## 2022-02-01 DIAGNOSIS — E78.5 HYPERLIPIDEMIA, UNSPECIFIED HYPERLIPIDEMIA TYPE: ICD-10-CM

## 2022-02-01 DIAGNOSIS — K21.9 GASTROESOPHAGEAL REFLUX DISEASE, UNSPECIFIED WHETHER ESOPHAGITIS PRESENT: ICD-10-CM

## 2022-02-01 RX ORDER — PANTOPRAZOLE SODIUM 40 MG/1
TABLET, DELAYED RELEASE ORAL
Qty: 90 TABLET | Refills: 1 | Status: SHIPPED | OUTPATIENT
Start: 2022-02-01 | End: 2022-08-02

## 2022-02-11 ENCOUNTER — HOSPITAL ENCOUNTER (OUTPATIENT)
Dept: NON INVASIVE DIAGNOSTICS | Age: 67
Discharge: HOME | End: 2022-02-11
Payer: MEDICARE

## 2022-02-11 DIAGNOSIS — I34.0 MODERATE MITRAL REGURGITATION: ICD-10-CM

## 2022-02-11 DIAGNOSIS — R53.83 FATIGUE, UNSPECIFIED TYPE: ICD-10-CM

## 2022-02-11 DIAGNOSIS — R06.02 SOB (SHORTNESS OF BREATH): ICD-10-CM

## 2022-02-11 LAB
LV EF: 60 %
LVEF MODALITY: NORMAL

## 2022-02-11 PROCEDURE — 93306 TTE W/DOPPLER COMPLETE: CPT

## 2022-02-15 ENCOUNTER — HOSPITAL ENCOUNTER (OUTPATIENT)
Age: 67
Discharge: HOME | End: 2022-02-17
Payer: MEDICARE

## 2022-02-15 ENCOUNTER — HOSPITAL ENCOUNTER (OUTPATIENT)
Dept: GENERAL RADIOLOGY | Age: 67
Discharge: HOME | End: 2022-02-17
Payer: MEDICARE

## 2022-02-15 DIAGNOSIS — N20.0 KIDNEY STONE: ICD-10-CM

## 2022-02-15 PROCEDURE — 74018 RADEX ABDOMEN 1 VIEW: CPT

## 2022-02-22 ENCOUNTER — OFFICE VISIT (OUTPATIENT)
Dept: UROLOGY | Age: 67
End: 2022-02-22
Payer: MEDICARE

## 2022-02-22 ENCOUNTER — HOSPITAL ENCOUNTER (OUTPATIENT)
Age: 67
Setting detail: SPECIMEN
Discharge: HOME | End: 2022-02-22
Payer: MEDICARE

## 2022-02-22 VITALS
HEART RATE: 82 BPM | WEIGHT: 204 LBS | BODY MASS INDEX: 35.02 KG/M2 | SYSTOLIC BLOOD PRESSURE: 130 MMHG | TEMPERATURE: 98.6 F | DIASTOLIC BLOOD PRESSURE: 78 MMHG

## 2022-02-22 DIAGNOSIS — R31.0 GROSS HEMATURIA: ICD-10-CM

## 2022-02-22 DIAGNOSIS — N20.0 KIDNEY STONES: Primary | ICD-10-CM

## 2022-02-22 LAB
-: ABNORMAL
BACTERIA: ABNORMAL
BILIRUBIN URINE: NEGATIVE
COLOR: YELLOW
EPITHELIAL CELLS UA: ABNORMAL /HPF (ref 0–25)
GLUCOSE URINE: NEGATIVE
KETONES, URINE: NEGATIVE
LEUKOCYTE ESTERASE, URINE: ABNORMAL
NITRITE, URINE: NEGATIVE
PH UA: 6 (ref 5–9)
PROTEIN UA: NEGATIVE
RBC UA: ABNORMAL /HPF (ref 0–2)
SPECIFIC GRAVITY UA: 1.02 (ref 1.01–1.02)
TURBIDITY: CLEAR
URINE HGB: NEGATIVE
UROBILINOGEN, URINE: NORMAL
WBC UA: ABNORMAL /HPF (ref 0–5)

## 2022-02-22 PROCEDURE — 99213 OFFICE O/P EST LOW 20 MIN: CPT | Performed by: PHYSICIAN ASSISTANT

## 2022-02-22 PROCEDURE — 81001 URINALYSIS AUTO W/SCOPE: CPT

## 2022-02-22 PROCEDURE — 87088 URINE BACTERIA CULTURE: CPT

## 2022-02-22 PROCEDURE — 87186 SC STD MICRODIL/AGAR DIL: CPT

## 2022-02-22 PROCEDURE — 87086 URINE CULTURE/COLONY COUNT: CPT

## 2022-02-22 ASSESSMENT — ENCOUNTER SYMPTOMS
VOMITING: 0
APNEA: 0
COUGH: 0
CONSTIPATION: 0
SHORTNESS OF BREATH: 0
BACK PAIN: 0
WHEEZING: 0
EYE REDNESS: 0
NAUSEA: 0
ABDOMINAL PAIN: 0
COLOR CHANGE: 0

## 2022-02-22 NOTE — PROGRESS NOTES
HPI:    Patient is a 78 y.o. female in no acute distress. She is alert and oriented to person, place, and time. History  8/6/2018 Referral from Dr. Chapincito Woods for gross hematuria. Bora George first noticed the hematuria in the spring and has had multiple episodes since.  This has not been associated with lower urinary tract symptoms, but she does experience suprapubic \"cramping\".  She was never a smoker. Bora George does work for [employer]. Bora George denies history of kidney stones or frequent urinary tract infection.  She did have a hysterectomy in 2009, but does have her ovaries.       8/2018 CT showed punctate nonobstructing stones in the right, and a 5 mm nonobstructing stone in the left kidney but there is no hydronephrosis.  There is no no masses or filling defects in the upper collecting system, ureters, or bladder to suggest malignancy.  The radiologist as mentioned a multicystic residual right ovary and a mass lesion of the vaginal cuff.  She did see gynecology for this and they are completing a work-up for her GYN abnormalities. Cystoscopy was negative.     10/2018 excision of pelvic mass     12/2018 left ESWL     Today:  Patient is here today for annual follow-up for kidney stones and hematuria. Patient denies any gross hematuria since her last visit. She denies any spontaneous stone passage over the last year. She denies any fever, chills, gross hematuria, flank pain, dysuria. Patient did have KUB prior to visit today which is independent reviewed showing right renal calculi and stable pelvic phleboliths. This is stable compared to the last 2 KUBs. She states that she occasionally has left lower abdominal pain which she feels is related to bowel movements. Past Medical History:   Diagnosis Date    Allergic rhinitis 1992    Aphthous ulcer of mouth     DDD (degenerative disc disease), cervical 2013    H/O echocardiogram 08/11/2015    EF 65%. Aortic valve sclerosis without stenosis.  Mild aortic regurgitation. Myxomatous thickening of the mitral leaflets with Moderate mitral regurgitation. Mils (grade l) diastolic dysfunction.  H/O echocardiogram 09/02/2016    EF >60%. Normal LV wall thickness and cavity size. No definite specific wall motion abnormalities were identified. Left atrium is mildly dilated (29-33) left atrial volume index of 31 ml/m2. Mild aortic stenosis. Myxomatous thickening of the mitral leaflets with moderate eccentric mitral regurg. Mild treicuspid regurg. Mild diastolic dysfunction is seen.  H/O echocardiogram 08/06/2018    EF >55%. The LV wall thickness is mildly incrased. Moderate mitral regurg. Mild aortic stenosis. Mild aortic regurg. Mild tricuspid regurg. Evidence of mild (grade I) diastolic dysfucntion is seen.      Hyperglycemia 2013    Hyperlipidemia     Hypertension     Mitral regurgitation 2010    MVP (mitral valve prolapse) 2010    Obesity 2010    Pulmonary hypertension ([address] 2013    Sleep apnea 2012    Uterine fibroid 2008    Venous insufficiency 2013    Wears glasses      Past Surgical History:   Procedure Laterality Date    ABDOMEN SURGERY  10/12/2018    pelvic mass removed     COLONOSCOPY  08/13/2018    -polyp(benign lymphoid)hemorrhoids    COLONOSCOPY N/A 8/13/2018    COLONOSCOPY POLYPECTOMY SNARE/COLD BIOPSY performed by Yaniv Painting MD at [facility]  2008    Dr. Asha Sommers  2009    HYSTERECTOMY  2009    supracervical, BSO with cervical conization, Dr. Wandy Hollis ESWL Left 12/4/2018    ESWL EXTRACORPEAL SHOCK WAVE LITHOTRIPSY performed by Joni Bernard MD at [facility] Encounter Medications as of 2/22/2022   Medication Sig Dispense Refill    pantoprazole (PROTONIX) 40 MG tablet TAKE 1 TABLET BY MOUTH EVERY DAY BEFORE BREAKFAST 90 tablet 1    celecoxib (CELEBREX) 200 MG capsule Take 1 capsule by mouth daily 60 capsule 3    atorvastatin (LIPITOR) 20 MG tablet TAKE 1 TABLET BY MOUTH EVERY DAY 90 tablet 3    furosemide (LASIX) 40 MG tablet TAKE 1 TABLET BY MOUTH EVERY DAY 90 tablet 3    aspirin 81 MG tablet Take 81 mg by mouth daily      calcium carbonate (OSCAL) 500 MG TABS tablet Take 500 mg by mouth daily      Multiple Vitamins-Minerals (ALIVE ONCE DAILY WOMENS 50+ PO) Take by mouth      Ascorbic Acid (VITAMIN C) 250 MG tablet Take 250 mg by mouth daily      diclofenac sodium (VOLTAREN) 1 % GEL APPLY 2G TOPICALLY 2 TIMES DAILY (Patient not taking: Reported on 2/22/2022) 100 g 3    meloxicam (MOBIC) 15 MG tablet TAKE 1 TABLET BY MOUTH EVERY DAY (Patient not taking: Reported on 12/15/2021) 90 tablet 1    colchicine (COLCRYS) 0.6 MG tablet Take 1 tablet by mouth 2 times daily (Patient not taking: Reported on 12/15/2021) 180 tablet 3     No facility-administered encounter medications on file as of 2/22/2022.       Current Outpatient Medications on File Prior to Visit   Medication Sig Dispense Refill    pantoprazole (PROTONIX) 40 MG tablet TAKE 1 TABLET BY MOUTH EVERY DAY BEFORE BREAKFAST 90 tablet 1    celecoxib (CELEBREX) 200 MG capsule Take 1 capsule by mouth daily 60 capsule 3    atorvastatin (LIPITOR) 20 MG tablet TAKE 1 TABLET BY MOUTH EVERY DAY 90 tablet 3    furosemide (LASIX) 40 MG tablet TAKE 1 TABLET BY MOUTH EVERY DAY 90 tablet 3    aspirin 81 MG tablet Take 81 mg by mouth daily      calcium carbonate (OSCAL) 500 MG TABS tablet Take 500 mg by mouth daily      Multiple Vitamins-Minerals (ALIVE ONCE DAILY WOMENS 50+ PO) Take by mouth      Ascorbic Acid (VITAMIN C) 250 MG tablet Take 250 mg by mouth daily      diclofenac sodium (VOLTAREN) 1 % GEL APPLY 2G TOPICALLY 2 TIMES DAILY (Patient not taking: Reported on 2/22/2022) 100 g 3    meloxicam (MOBIC) 15 MG tablet TAKE 1 TABLET BY MOUTH EVERY DAY (Patient not taking: Reported on 12/15/2021) 90 tablet 1    colchicine (COLCRYS) 0.6 MG tablet Take 1 tablet by mouth 2 times daily (Patient not taking: Reported on 12/15/2021) 180 tablet 3     No current facility-administered medications on file prior to visit. Seasonal  Family History   Problem Relation Age of Onset    High Blood Pressure Father     Heart Disease Father     Stroke Father     Dementia Mother     Other Mother         thoracic AAA    Cancer Mother         bilateral kidney     Social History     Tobacco Use   Smoking Status Never Smoker   Smokeless Tobacco Never Used       Social History     Substance and Sexual Activity   Alcohol Use No       Review of Systems   Constitutional: Negative for appetite change, chills and fever. Eyes: Negative for redness and visual disturbance. Respiratory: Negative for apnea, cough, shortness of breath and wheezing. Cardiovascular: Negative for chest pain and leg swelling. Gastrointestinal: Negative for abdominal pain, constipation, nausea and vomiting. Genitourinary: Negative for difficulty urinating, dyspareunia, dysuria, enuresis, flank pain, frequency, hematuria, pelvic pain, urgency, vaginal bleeding and vaginal discharge. Musculoskeletal: Negative for back pain, joint swelling and myalgias. Skin: Negative for color change, rash and wound. Neurological: Negative for dizziness, tremors and numbness. Hematological: Negative for adenopathy. Does not bruise/bleed easily. Psychiatric/Behavioral: Negative for sleep disturbance. /78 (Site: Right Upper Arm, Position: Sitting, Cuff Size: Medium Adult)   Pulse 82   Temp 98.6 °F (37 °C)   Wt 204 lb (92.5 kg)   LMP  (LMP Unknown)   BMI 35.02 kg/m²       PHYSICAL EXAM:  Constitutional: Patient resting comfortably, in no acute distress. Neuro: Alert and oriented to person place and time.    Psych: Mood and affect normal.  HEENT: normocephalic, atraumatic  Lungs: Respiratory effort normal, unlabored  Abdomen: Soft, non-tender, non-distended  : No CVA tenderness. Pelvic: deferred     Lab Results   Component Value Date    BUN 14 12/08/2021     Lab Results   Component Value Date    CREATININE 0.81 12/08/2021       ASSESSMENT:   Diagnosis Orders   1. Kidney stones  XR ABDOMEN (KUB) (SINGLE AP VIEW)   2. Gross hematuria  Urinalysis with Microscopic    Culture, Urine       PLAN:  We will check a urinalysis and culture. We will call with the results. We are checking this secondary to her history of gross hematuria    Patient instructed to drink at least 80 ounces of \"good fluids\" daily (water, juice, Gatoraide, milk) and to avoid/minimize intake of \"bad fluids\" (soda pop, coffee, tea, alcohol, energy drinks). Also advised to avoid excessive consumption of sodium and animal protein to decrease risk of recurrent kidney stones. Follow-up in 1 year with KUB and for hematuria check, next year will be 5 years after her initial hematuria work-up.

## 2022-02-24 ENCOUNTER — TELEPHONE (OUTPATIENT)
Dept: FAMILY MEDICINE CLINIC | Age: 67
End: 2022-02-24

## 2022-02-24 LAB
CULTURE: ABNORMAL
SPECIMEN DESCRIPTION: ABNORMAL

## 2022-02-24 RX ORDER — CIPROFLOXACIN 500 MG/1
500 TABLET, FILM COATED ORAL 2 TIMES DAILY
Qty: 14 TABLET | Refills: 0 | Status: SHIPPED | OUTPATIENT
Start: 2022-02-24 | End: 2022-03-03

## 2022-02-24 NOTE — TELEPHONE ENCOUNTER
UACS is positive for infection. I sent in culture specific cipro. Take this antibiotic until gone. Take this with food and eat yogurt once per day to prevent GI upset. If you develop nausea, vomiting, or fevers call the office or go to the ER. If your urinary symptoms do not improve once completing the antibiotics call our office.

## 2022-04-20 ENCOUNTER — HOSPITAL ENCOUNTER (OUTPATIENT)
Age: 67
Discharge: HOME | End: 2022-04-20
Payer: MEDICARE

## 2022-04-20 DIAGNOSIS — E78.5 HYPERLIPIDEMIA, UNSPECIFIED HYPERLIPIDEMIA TYPE: ICD-10-CM

## 2022-04-20 DIAGNOSIS — I10 ESSENTIAL HYPERTENSION: ICD-10-CM

## 2022-04-20 DIAGNOSIS — R73.9 HYPERGLYCEMIA: ICD-10-CM

## 2022-04-20 LAB
ABSOLUTE EOS #: 0.23 K/UL (ref 0–0.44)
ABSOLUTE IMMATURE GRANULOCYTE: <0.03 K/UL (ref 0–0.3)
ABSOLUTE LYMPH #: 2.33 K/UL (ref 1.1–3.7)
ABSOLUTE MONO #: 0.66 K/UL (ref 0.1–1.2)
ALT SERPL-CCNC: 30 U/L (ref 5–33)
ANION GAP SERPL CALCULATED.3IONS-SCNC: 8 MMOL/L (ref 9–17)
AST SERPL-CCNC: 21 U/L
BASOPHILS # BLD: 0 % (ref 0–2)
BASOPHILS ABSOLUTE: <0.03 K/UL (ref 0–0.2)
BUN BLDV-MCNC: 13 MG/DL (ref 8–23)
BUN/CREAT BLD: 18 (ref 9–20)
CALCIUM SERPL-MCNC: 9.4 MG/DL (ref 8.6–10.4)
CHLORIDE BLD-SCNC: 103 MMOL/L (ref 98–107)
CO2: 31 MMOL/L (ref 20–31)
CREAT SERPL-MCNC: 0.73 MG/DL (ref 0.5–0.9)
EOSINOPHILS RELATIVE PERCENT: 4 % (ref 1–4)
ESTIMATED AVERAGE GLUCOSE: 123 MG/DL
GFR AFRICAN AMERICAN: >60 ML/MIN
GFR NON-AFRICAN AMERICAN: >60 ML/MIN
GFR SERPL CREATININE-BSD FRML MDRD: ABNORMAL ML/MIN/{1.73_M2}
GFR SERPL CREATININE-BSD FRML MDRD: ABNORMAL ML/MIN/{1.73_M2}
GLUCOSE BLD-MCNC: 100 MG/DL (ref 70–99)
HBA1C MFR BLD: 5.9 % (ref 4–6)
HCT VFR BLD CALC: 41.4 % (ref 36.3–47.1)
HEMOGLOBIN: 12.9 G/DL (ref 11.9–15.1)
HIGH SENSITIVE C-REACTIVE PROTEIN: 4 MG/L
IMMATURE GRANULOCYTES: 0 %
LYMPHOCYTES # BLD: 37 % (ref 24–43)
MCH RBC QN AUTO: 28.7 PG (ref 25.2–33.5)
MCHC RBC AUTO-ENTMCNC: 31.2 G/DL (ref 28.4–34.8)
MCV RBC AUTO: 92.2 FL (ref 82.6–102.9)
MONOCYTES # BLD: 10 % (ref 3–12)
NRBC AUTOMATED: 0 PER 100 WBC
PDW BLD-RTO: 12.8 % (ref 11.8–14.4)
PLATELET # BLD: 213 K/UL (ref 138–453)
PMV BLD AUTO: 10.2 FL (ref 8.1–13.5)
POTASSIUM SERPL-SCNC: 4.3 MMOL/L (ref 3.7–5.3)
RBC # BLD: 4.49 M/UL (ref 3.95–5.11)
SEG NEUTROPHILS: 49 % (ref 36–65)
SEGMENTED NEUTROPHILS ABSOLUTE COUNT: 3.13 K/UL (ref 1.5–8.1)
SODIUM BLD-SCNC: 142 MMOL/L (ref 135–144)
WBC # BLD: 6.4 K/UL (ref 3.5–11.3)

## 2022-04-20 PROCEDURE — 83036 HEMOGLOBIN GLYCOSYLATED A1C: CPT

## 2022-04-20 PROCEDURE — 84450 TRANSFERASE (AST) (SGOT): CPT

## 2022-04-20 PROCEDURE — 84460 ALANINE AMINO (ALT) (SGPT): CPT

## 2022-04-20 PROCEDURE — 86141 C-REACTIVE PROTEIN HS: CPT

## 2022-04-20 PROCEDURE — 36415 COLL VENOUS BLD VENIPUNCTURE: CPT

## 2022-04-20 PROCEDURE — 80048 BASIC METABOLIC PNL TOTAL CA: CPT

## 2022-04-20 PROCEDURE — 85025 COMPLETE CBC W/AUTO DIFF WBC: CPT

## 2022-04-29 ENCOUNTER — OFFICE VISIT (OUTPATIENT)
Dept: FAMILY MEDICINE CLINIC | Age: 67
End: 2022-04-29
Payer: MEDICARE

## 2022-04-29 VITALS
WEIGHT: 205.8 LBS | HEIGHT: 64 IN | DIASTOLIC BLOOD PRESSURE: 72 MMHG | BODY MASS INDEX: 35.13 KG/M2 | SYSTOLIC BLOOD PRESSURE: 132 MMHG

## 2022-04-29 DIAGNOSIS — Z23 NEED FOR PNEUMOCOCCAL VACCINE: ICD-10-CM

## 2022-04-29 DIAGNOSIS — K21.9 GASTROESOPHAGEAL REFLUX DISEASE, UNSPECIFIED WHETHER ESOPHAGITIS PRESENT: ICD-10-CM

## 2022-04-29 DIAGNOSIS — E66.09 CLASS 1 OBESITY DUE TO EXCESS CALORIES WITHOUT SERIOUS COMORBIDITY WITH BODY MASS INDEX (BMI) OF 33.0 TO 33.9 IN ADULT: ICD-10-CM

## 2022-04-29 DIAGNOSIS — I10 ESSENTIAL HYPERTENSION: ICD-10-CM

## 2022-04-29 DIAGNOSIS — R73.9 HYPERGLYCEMIA: ICD-10-CM

## 2022-04-29 DIAGNOSIS — I34.0 MODERATE MITRAL REGURGITATION: ICD-10-CM

## 2022-04-29 DIAGNOSIS — R06.02 SOB (SHORTNESS OF BREATH): ICD-10-CM

## 2022-04-29 DIAGNOSIS — Z00.00 MEDICARE ANNUAL WELLNESS VISIT, INITIAL: Primary | ICD-10-CM

## 2022-04-29 DIAGNOSIS — E78.5 HYPERLIPIDEMIA, UNSPECIFIED HYPERLIPIDEMIA TYPE: ICD-10-CM

## 2022-04-29 PROCEDURE — G0009 ADMIN PNEUMOCOCCAL VACCINE: HCPCS | Performed by: FAMILY MEDICINE

## 2022-04-29 PROCEDURE — 99213 OFFICE O/P EST LOW 20 MIN: CPT | Performed by: FAMILY MEDICINE

## 2022-04-29 PROCEDURE — 90670 PCV13 VACCINE IM: CPT | Performed by: FAMILY MEDICINE

## 2022-04-29 PROCEDURE — G0438 PPPS, INITIAL VISIT: HCPCS | Performed by: FAMILY MEDICINE

## 2022-04-29 SDOH — ECONOMIC STABILITY: FOOD INSECURITY: WITHIN THE PAST 12 MONTHS, THE FOOD YOU BOUGHT JUST DIDN'T LAST AND YOU DIDN'T HAVE MONEY TO GET MORE.: NEVER TRUE

## 2022-04-29 SDOH — ECONOMIC STABILITY: FOOD INSECURITY: WITHIN THE PAST 12 MONTHS, YOU WORRIED THAT YOUR FOOD WOULD RUN OUT BEFORE YOU GOT MONEY TO BUY MORE.: NEVER TRUE

## 2022-04-29 ASSESSMENT — PATIENT HEALTH QUESTIONNAIRE - PHQ9
SUM OF ALL RESPONSES TO PHQ QUESTIONS 1-9: 0
SUM OF ALL RESPONSES TO PHQ9 QUESTIONS 1 & 2: 0
SUM OF ALL RESPONSES TO PHQ QUESTIONS 1-9: 0
SUM OF ALL RESPONSES TO PHQ QUESTIONS 1-9: 0
1. LITTLE INTEREST OR PLEASURE IN DOING THINGS: 0
2. FEELING DOWN, DEPRESSED OR HOPELESS: 0
SUM OF ALL RESPONSES TO PHQ QUESTIONS 1-9: 0

## 2022-04-29 ASSESSMENT — VISUAL ACUITY
OD_CC: 40/20
OS_CC: 30/20

## 2022-04-29 ASSESSMENT — LIFESTYLE VARIABLES
HOW OFTEN DO YOU HAVE A DRINK CONTAINING ALCOHOL: MONTHLY OR LESS
HOW MANY STANDARD DRINKS CONTAINING ALCOHOL DO YOU HAVE ON A TYPICAL DAY: 1 OR 2

## 2022-04-29 ASSESSMENT — SOCIAL DETERMINANTS OF HEALTH (SDOH): HOW HARD IS IT FOR YOU TO PAY FOR THE VERY BASICS LIKE FOOD, HOUSING, MEDICAL CARE, AND HEATING?: NOT HARD AT ALL

## 2022-04-29 NOTE — PROGRESS NOTES
TIERRA Carrero am scribing for and in the presence of Dr. Holly Nunez MD. 4/29/22/9:15am/SNP    Medicare Annual Wellness Visit    Lyle Garcia is here for Medicare AWV    Assessment & Plan   Need for pneumococcal vaccine  -     Pneumococcal conjugate vaccine 13-valent  -     [place] ADMIN PNEUMOCOCCAL VACCINE  Gastroesophageal reflux disease, unspecified whether esophagitis present  Hyperglycemia  -     Hemoglobin A1C; Future  -     Basic Metabolic Panel; Future  Essential hypertension  -     CBC with Auto Differential; Future  -     AST; Future  -     ALT; Future  Hyperlipidemia, unspecified hyperlipidemia type  -     AST; Future  -     ALT; Future  -     CRP,High Sensitivity; Future  -     Lipid Panel; Future  Medicare annual wellness visit, initial  Moderate mitral regurgitation  SOB (shortness of breath)  Class 1 obesity due to excess calories without serious comorbidity with body mass index (BMI) of 33.0 to 33.9 in adult        PLAN:  We discuss the joint pain, she is taking Celebrex but does not believe this is offering her any relief, I advise for her to stop taking this. If once she stops this, the pain gets worse, I would like for her to call the office. MOCA reviewed with no concerns    Labs reviewed with no concerns. We discuss her weight and lack of motivation to start exercising. I suggest for her to join [employer] for physical activity. We discussed the dyspnea and suggest it is multifactorial including valbvular ht and HTN and mostly obesity   We discussed ways to improve the patients health including healthy eating habits, exercise routines, etc. I recommended that she read the book Younger Next Year by Maria Del Rosario Session. I will see her back in 6 months.      Recommendations for Preventive Services Due: see orders and patient instructions/AVS.  Recommended screening schedule for the next 5-10 years is provided to the patient in written form: see Patient Instructions/AVS.     Return in about 6 months (around 10/29/2022). Subjective   The following acute and/or chronic problems were also addressed today:    HYPERTENSION:  She is not exercising. She is adherent to a low-salt diet.    Blood pressure is not being monitored at home.        HYPERGLYCEMIA:  Diet compliance: compliant most of the time  Current exercise: none     HYPERLIPIDEMIA:     Medication compliance: compliant all of the time. Patient is  following a low fat, low cholesterol diet.    She is not exercising regularly. ROS:  General Constitutional: Denies chills. Denies fever. Denies headache. Denies lightheadedness. Ophthalmologic: Denies blurred vision. ENT: Denies nasal congestion. Denies sore throat. Denies ear pain and pressure. Respiratory: Denies cough. Denies shortness of breath. Denies wheezing. Cardiovascular: Denies chest pain at rest. Denies irregular heartbeat. Denies palpitations. Gastrointestinal: Denies abdominal pain. Denies blood in the stool. Denies constipation. Denies diarrhea. Denies nausea. Denies vomiting. Genitourinary: Denies blood in the urine. Denies difficulty urinating. Denies frequent urination. Denies painful urination. Denies urinary incontinence  Musculoskeletal: Denies muscle aches. Denies swollen joints. Admits pain in L shoulder and fingers  Peripheral Vascular: Denies pain/cramping in legs after exertion. Skin: Denies dry skin. Denies itching. Denies rash. Neurologic: Denies falls. Denies dizziness. Denies fainting. Denies tingling/numbness. Psychiatric: Denies sleep disturbance. Denies anxiety. Denies depressed mood. Patient's complete Health Risk Assessment and screening values have been reviewed and are found in Flowsheets. The following problems were reviewed today and where indicated follow up appointments were made and/or referrals ordered.     Positive Risk Factor Screenings with Interventions:               General Health and ACP:  General  In general, how would you say your health is?: Good  In the past 7 days, have you experienced any of the following: New or Increased Pain, New or Increased Fatigue, Loneliness, Social Isolation, Stress or Anger?: No  Do you get the social and emotional support that you need?: Yes  Do you have a Living Will?: (!) No    Advance Directives     Power of  Living Will ACP-Advance Directive ACP-Power of     Not on File Not on File Not on File Not on File      General Health Risk Interventions:  · see plan    Health Habits/Nutrition:     Physical Activity: Inactive    Days of Exercise per Week: 0 days    Minutes of Exercise per Session: 0 min     Have you lost any weight without trying in the past 3 months?: No  Body mass index: (!) 35.32  Have you seen the dentist within the past year?: Yes    Health Habits/Nutrition Interventions:  · Inadequate physical activity:  educational materials provided to promote increased physical activity    Hearing/Vision:  Do you or your family notice any trouble with your hearing that hasn't been managed with hearing aids?: No  Do you have difficulty driving, watching TV, or doing any of your daily activities because of your eyesight?: No  Have you had an eye exam within the past year?: (!) No   Visual Acuity Screening    Right eye Left eye Both eyes   Without correction:      With correction: 40/20 30/20 30/20     Safety:  Do you have working smoke detectors?: Yes  Do you have any tripping hazards - loose or unsecured carpets or rugs?: (!) Yes  Do you have any tripping hazards - clutter in doorways, halls, or stairs?: No  Do you have either shower bars, grab bars, non-slip mats or non-slip surfaces in your shower or bathtub?: (!) No  Do all of your stairways have a railing or banister?: (!) No  Do you always fasten your seatbelt when you are in a car?: Yes           Objective   Vitals:    04/29/22 0907   BP: 132/72   Site: Left Upper Arm   Position: Sitting   Cuff Size: Large Adult   Weight: 205 lb 12.8 oz (93.4 kg)   Height: 5' 4\" (1.626 m)      Body mass index is 35.33 kg/m². General Appearance: alert and oriented to person, place and time, well developed and well- nourished, in no acute distress  Skin: warm and dry, no rash or erythema  Head: normocephalic and atraumatic  Eyes: pupils equal, round, and reactive to light, extraocular eye movements intact, conjunctivae normal  ENT: tympanic membrane, external ear and ear canal normal bilaterally, nose without deformity, nasal mucosa and turbinates normal without polyps  Neck: supple and non-tender without mass, no thyromegaly or thyroid nodules, no cervical lymphadenopathy  Pulmonary/Chest: clear to auscultation bilaterally- no wheezes, rales or rhonchi, normal air movement, no respiratory distress  Cardiovascular: normal rate, regular rhythm, normal S1 and S2, no rubs, clicks, or gallops, distal pulses intact, no carotid bruits, rate 80, 2/6 systolic heard at LSB and aortic outflow  Abdomen: soft, non-tender, non-distended, normal bowel sounds, no masses or organomegaly  Extremities: no cyanosis, clubbing or edema  Musculoskeletal: normal range of motion, no joint swelling, deformity or tenderness  Neurologic: reflexes normal and symmetric, no cranial nerve deficit, gait, coordination and speech normal       Allergies   Allergen Reactions    Seasonal      Prior to Visit Medications    Medication Sig Taking?  Authorizing Provider   pantoprazole (PROTONIX) 40 MG tablet TAKE 1 TABLET BY MOUTH EVERY DAY BEFORE BREAKFAST Yes Saumya Roach MD   diclofenac sodium (VOLTAREN) 1 % GEL APPLY 2G TOPICALLY 2 TIMES DAILY Yes Saumya Roach MD   meloxicam (MOBIC) 15 MG tablet TAKE 1 TABLET BY MOUTH EVERY DAY Yes Saumya Roach MD   atorvastatin (LIPITOR) 20 MG tablet TAKE 1 TABLET BY MOUTH EVERY DAY Yes Saumya Roach MD   furosemide (LASIX) 40 MG tablet TAKE 1 TABLET BY MOUTH EVERY DAY Yes Saumya Roach MD   colchicine (COLCRYS) 0.6 MG tablet Take 1 tablet by mouth 2 times daily Yes Annabelle Cullen MD   aspirin 81 MG tablet Take 81 mg by mouth daily Yes Historical Provider, MD   calcium carbonate (OSCAL) 500 MG TABS tablet Take 500 mg by mouth daily Yes Historical Provider, MD   Multiple Vitamins-Minerals (ALIVE ONCE DAILY WOMENS 50+ PO) Take by mouth Yes Historical Provider, MD   Ascorbic Acid (VITAMIN C) 250 MG tablet Take 250 mg by mouth daily Yes Historical Provider, MD       CareTeam (Including outside providers/suppliers regularly involved in providing care):   Patient Care Team:  AIDEE Moe - CNP as PCP - General (Family Nurse Practitioner)  Annabelle Cullen MD as PCP - [facility] Empaneled Provider    Reviewed and updated this visit:  Tobacco  Allergies  Meds  Med Hx  Surg Hx  Soc Hx  Fam Hx                I, Dr. Annabelle Cullen, personally performed the services described in this documentation as scribed/transcribed by FEDERICA Collins in my presence, and is both accurate and complete. Good clinical outcome

## 2022-04-29 NOTE — PATIENT INSTRUCTIONS
SURVEY:    You may be receiving a survey from [employer] regarding your visit today. Please complete the survey to enable us to provide the highest quality of care to you and your family. If you cannot score us a very good on any question, please call the office to discuss how we could of made your experience a very good one. Thank you. PLAN:  We discuss the joint pain, she is taking Cymbalta but does not believe this is offering her any relief, I advise for her to stop taking this. If once she stops this, the pain gets worse, I would like for her to call the office. MOCA reviewed with no concerns    Labs reviewed with no concerns. We discuss her weight and lack of motivation to start exercising. I suggest for her to join [employer] for physical activity. We discussed ways to improve the patients health including healthy eating habits, exercise routines, etc. I recommended that she read the book Younger Next Year by Clarita Padron. I will see her back in 6 months.

## 2022-09-06 ENCOUNTER — TELEPHONE (OUTPATIENT)
Dept: PRIMARY CARE CLINIC | Age: 67
End: 2022-09-06

## 2022-09-06 NOTE — TELEPHONE ENCOUNTER
Patient has not tried to check temp. Her chest feels tight, ears and throat hurt, headaches. She is going to take home test and call back after lunch. Patient was instructed on OTC supplements she can take to help shorten course as well as OTC agents for symptom management. Pulse ox monitoring parameters discussed and patient verbalized understanding when to notify office and or report to ED.

## 2022-09-06 NOTE — TELEPHONE ENCOUNTER
Thank you. Will look to hear back from patient regarding test. I am assuming this will be positive given 's recent diagnosis.

## 2022-09-06 NOTE — TELEPHONE ENCOUNTER
I recommend she take a covid test. If she would like, we can order one for her (please pend). How high is her fever? Does she have any respiratory symptoms such as cough, wheezing, SOB? Encourage pulse oximeter monitoring and when to notify office/ED. Encourage OTC medications for symptom management. Discuss S/S and when to call 911/go to ED. We could consider alternative treatments such as paxlovid or bebtelovimab; however, will need positive covid test prior to pursuing these options.

## 2022-09-07 ENCOUNTER — HOSPITAL ENCOUNTER (OUTPATIENT)
Age: 67
Discharge: HOME | End: 2022-09-07
Payer: MEDICARE

## 2022-09-07 ENCOUNTER — OFFICE VISIT (OUTPATIENT)
Dept: PRIMARY CARE CLINIC | Age: 67
End: 2022-09-07
Payer: MEDICARE

## 2022-09-07 ENCOUNTER — HOSPITAL ENCOUNTER (OUTPATIENT)
Dept: LAB | Age: 67
Setting detail: SPECIMEN
Discharge: HOME | End: 2022-09-07
Payer: MEDICARE

## 2022-09-07 VITALS
SYSTOLIC BLOOD PRESSURE: 130 MMHG | RESPIRATION RATE: 14 BRPM | TEMPERATURE: 98.6 F | DIASTOLIC BLOOD PRESSURE: 82 MMHG | HEIGHT: 64 IN | HEART RATE: 75 BPM | BODY MASS INDEX: 34.66 KG/M2 | WEIGHT: 203 LBS | OXYGEN SATURATION: 98 %

## 2022-09-07 DIAGNOSIS — Z20.822 COVID-19 RULED OUT: ICD-10-CM

## 2022-09-07 DIAGNOSIS — R07.89 CHEST DISCOMFORT: ICD-10-CM

## 2022-09-07 DIAGNOSIS — R05.9 COUGH: Primary | ICD-10-CM

## 2022-09-07 DIAGNOSIS — R05.9 COUGH: ICD-10-CM

## 2022-09-07 PROCEDURE — U0003 INFECTIOUS AGENT DETECTION BY NUCLEIC ACID (DNA OR RNA); SEVERE ACUTE RESPIRATORY SYNDROME CORONAVIRUS 2 (SARS-COV-2) (CORONAVIRUS DISEASE [COVID-19]), AMPLIFIED PROBE TECHNIQUE, MAKING USE OF HIGH THROUGHPUT TECHNOLOGIES AS DESCRIBED BY CMS-2020-01-R: HCPCS

## 2022-09-07 PROCEDURE — 1123F ACP DISCUSS/DSCN MKR DOCD: CPT | Performed by: NURSE PRACTITIONER

## 2022-09-07 PROCEDURE — U0005 INFEC AGEN DETEC AMPLI PROBE: HCPCS

## 2022-09-07 PROCEDURE — 99213 OFFICE O/P EST LOW 20 MIN: CPT | Performed by: NURSE PRACTITIONER

## 2022-09-07 PROCEDURE — 93005 ELECTROCARDIOGRAM TRACING: CPT

## 2022-09-07 PROCEDURE — C9803 HOPD COVID-19 SPEC COLLECT: HCPCS

## 2022-09-07 RX ORDER — AZITHROMYCIN 250 MG/1
TABLET, FILM COATED ORAL
Qty: 6 TABLET | Refills: 0 | Status: SHIPPED | OUTPATIENT
Start: 2022-09-07

## 2022-09-07 RX ORDER — PREDNISONE 20 MG/1
20 TABLET ORAL 2 TIMES DAILY
Qty: 10 TABLET | Refills: 0 | Status: SHIPPED | OUTPATIENT
Start: 2022-09-07 | End: 2022-09-12

## 2022-09-07 ASSESSMENT — ENCOUNTER SYMPTOMS
RHINORRHEA: 1
COUGH: 1
SHORTNESS OF BREATH: 1

## 2022-09-07 NOTE — PROGRESS NOTES
Myxomatous thickening of the mitral leaflets with moderate eccentric mitral regurg. Mild treicuspid regurg. Mild diastolic dysfunction is seen. H/O echocardiogram 08/06/2018    EF >55%. The LV wall thickness is mildly incrased. Moderate mitral regurg. Mild aortic stenosis. Mild aortic regurg. Mild tricuspid regurg. Evidence of mild (grade I) diastolic dysfucntion is seen. Hyperglycemia 2013    Hyperlipidemia     Hypertension     Mitral regurgitation 2010    MVP (mitral valve prolapse) 2010    Obesity 2010    Pulmonary hypertension ([address] 2013    Sleep apnea 2012    Uterine fibroid 2008    Venous insufficiency 2013    Wears glasses       Reviewed all health maintenance requirements and ordered appropriate tests  Health Maintenance Due   Topic Date Due    DTaP/Tdap/Td vaccine (1 - Tdap) Never done    Shingles vaccine (1 of 2) Never done    COVID-19 Vaccine (4 - Booster for Pfizer series) 03/12/2022    Lipids  08/04/2022    Flu vaccine (1) 09/01/2022       Past Surgical History:     Past Surgical History:   Procedure Laterality Date    ABDOMINAL SURGERY  10/12/2018    pelvic mass removed     COLONOSCOPY  08/13/2018    -polyp(benign lymphoid)hemorrhoids    COLONOSCOPY N/A 8/13/2018    COLONOSCOPY POLYPECTOMY SNARE/COLD BIOPSY performed by Alfonzo Cabot, MD at [address]  2008    Dr. Ibanez Last  2009    HYSTERECTOMY  2009    supracervical, BSO with cervical conization, Dr. Ira Hi ESWL Left 12/4/2018    ESWL [address] LITHOTRIPSY performed by Denis Bolanos MD at [address]        Medications:       Prior to Admission medications    Medication Sig Start Date End Date Taking?  Authorizing Provider   predniSONE (DELTASONE) 20 MG tablet Take 1 tablet by mouth 2 times daily for 5 days 9/7/22 9/12/22 Yes Pamla Reasons, APRN - CNP   azithromycin (ZITHROMAX Z-DEBORA) 250 MG tablet Take 2 tablets (500mg) by mouth once daily on day 1. Take 1 tablet (250mg) by mouth once daily on days 2 - 5. 9/7/22  Yes AIDEE Black CNP   furosemide (LASIX) 40 MG tablet TAKE 1 TABLET BY MOUTH EVERY DAY 9/2/22  Yes AIDEE Black CNP   atorvastatin (LIPITOR) 20 MG tablet TAKE 1 TABLET BY MOUTH EVERY DAY 9/2/22  Yes AIDEE Black CNP   pantoprazole (PROTONIX) 40 MG tablet TAKE 1 TABLET BY MOUTH EVERY DAY BEFORE BREAKFAST 8/2/22  Yes AIDEE Black CNP   diclofenac sodium (VOLTAREN) 1 % GEL APPLY 2G TOPICALLY 2 TIMES DAILY 12/7/21  Yes Karina Byers MD   meloxicam (MOBIC) 15 MG tablet TAKE 1 TABLET BY MOUTH EVERY DAY 9/30/21  Yes Karina Byers MD   colchicine (COLCRYS) 0.6 MG tablet Take 1 tablet by mouth 2 times daily 4/9/21  Yes Karina Byers MD   aspirin 81 MG tablet Take 81 mg by mouth daily   Yes Historical Provider, MD   calcium carbonate (OSCAL) 500 MG TABS tablet Take 500 mg by mouth daily   Yes Historical Provider, MD   Multiple Vitamins-Minerals (ALIVE ONCE DAILY WOMENS 50+ PO) Take by mouth   Yes Historical Provider, MD   Ascorbic Acid (VITAMIN C) 250 MG tablet Take 250 mg by mouth daily   Yes Historical Provider, MD        Allergies:       Seasonal    Social History:     Tobacco:    reports that she has never smoked. She has never used smokeless tobacco.  Alcohol:      reports no history of alcohol use. Drug Use:  reports no history of drug use. Family History:     Family History   Problem Relation Age of Onset    High Blood Pressure Father     Heart Disease Father     Stroke Father     Dementia Mother     Other Mother         thoracic AAA    Cancer Mother         bilateral kidney       Review of Systems:     Positive and Negative as described in HPI    Review of Systems   Constitutional:  Positive for chills. Negative for fever. HENT:  Positive for congestion, ear pain (L > R) and rhinorrhea.     Respiratory:  Positive for cough and shortness of breath. Cardiovascular:  Positive for chest pain (Admits chest \"soreness\"). Musculoskeletal:  Positive for myalgias. Physical Exam:   Vitals:  /82   Pulse 75   Temp 98.6 °F (37 °C)   Resp 14   Ht 5' 4\" (1.626 m)   Wt 203 lb (92.1 kg)   LMP  (LMP Unknown)   SpO2 98%   BMI 34.84 kg/m²     Physical Exam  Constitutional:       General: She is not in acute distress. Appearance: Normal appearance. She is obese. She is ill-appearing. She is not toxic-appearing. HENT:      Head: Normocephalic. Right Ear: Tympanic membrane, ear canal and external ear normal. There is no impacted cerumen. Left Ear: Tympanic membrane, ear canal and external ear normal. There is no impacted cerumen. Nose: Nose normal. No congestion or rhinorrhea. Mouth/Throat:      Mouth: Mucous membranes are moist.      Pharynx: No oropharyngeal exudate or posterior oropharyngeal erythema. Cardiovascular:      Rate and Rhythm: Normal rate and regular rhythm. Heart sounds: Normal heart sounds. No murmur heard. Pulmonary:      Effort: Pulmonary effort is normal. No respiratory distress. Breath sounds: Normal breath sounds. No stridor. No wheezing, rhonchi or rales. Musculoskeletal:      Cervical back: Neck supple. Lymphadenopathy:      Cervical: No cervical adenopathy. Skin:     General: Skin is warm. Neurological:      Mental Status: She is alert and oriented to person, place, and time. Psychiatric:         Mood and Affect: Mood normal.         Behavior: Behavior normal.         Thought Content:  Thought content normal.         Judgment: Judgment normal.       Data:     Lab Results   Component Value Date/Time     04/20/2022 07:45 AM    K 4.3 04/20/2022 07:45 AM     04/20/2022 07:45 AM    CO2 31 04/20/2022 07:45 AM    BUN 13 04/20/2022 07:45 AM    CREATININE 0.73 04/20/2022 07:45 AM    GLUCOSE 100 04/20/2022 07:45 AM    GLUCOSE 98 05/08/2012 02:33 PM    PROT 7.0 06/22/2018 09:13 AM    LABALBU 3.7 06/22/2018 09:13 AM    LABALBU 4.2 05/08/2012 02:33 PM    BILITOT 0.38 06/22/2018 09:13 AM    ALKPHOS 101 06/22/2018 09:13 AM    AST 21 04/20/2022 07:45 AM    ALT 30 04/20/2022 07:45 AM     Lab Results   Component Value Date/Time    WBC 6.4 04/20/2022 07:45 AM    RBC 4.49 04/20/2022 07:45 AM    RBC 4.52 05/08/2012 02:33 PM    HGB 12.9 04/20/2022 07:45 AM    HCT 41.4 04/20/2022 07:45 AM    MCV 92.2 04/20/2022 07:45 AM    MCH 28.7 04/20/2022 07:45 AM    MCHC 31.2 04/20/2022 07:45 AM    RDW 12.8 04/20/2022 07:45 AM     04/20/2022 07:45 AM     05/08/2012 02:33 PM    MPV 10.2 04/20/2022 07:45 AM     Lab Results   Component Value Date/Time    TSH 2.20 07/15/2015 09:11 AM     Lab Results   Component Value Date/Time    CHOL 116 08/04/2021 07:51 AM    HDL 47 08/04/2021 07:51 AM    LABA1C 5.9 04/20/2022 07:45 AM       Assessment/Plan:      Diagnosis Orders   1. Cough  COVID-19    azithromycin (ZITHROMAX Z-DEBORA) 250 MG tablet      2. COVID-19 ruled out  COVID-19      3. Chest discomfort  EKG 12 lead          - Vital signs stable, lung sounds clear to auscultation  - Her  is COVID-positive. I question if she tested for COVID too early. Will retest for COVID today. Order placed for her to complete. Quarantine until results. Encourage OTC medication for symptom management such as Mucinex, Tylenol, etc.  - Encouraged rest, hydration  - Encouraged supplementation with zinc, vitamin C, vitamin D  - Azithromycin and prednisone 20 mg twice daily prescribed today  - Further evaluation with EKG given her chest \"soreness\" and cardiac history  - Reviewed emergent signs and symptoms and when to present to the emergency department such as worsening shortness of breath, chest pain, or fever over 102  - Encourage pulse oximetry monitoring. If SPO2 less than 95 notify office/provider on-call.   If SPO2 less than 90% present to the emergency department    Completed Refills   Requested Prescriptions Signed Prescriptions Disp Refills    predniSONE (DELTASONE) 20 MG tablet 10 tablet 0     Sig: Take 1 tablet by mouth 2 times daily for 5 days    azithromycin (ZITHROMAX Z-DEBORA) 250 MG tablet 6 tablet 0     Sig: Take 2 tablets (500mg) by mouth once daily on day 1. Take 1 tablet (250mg) by mouth once daily on days 2 - 5. Orders Placed This Encounter   Procedures    COVID-19     Standing Status:   Future     Number of Occurrences:   1     Standing Expiration Date:   9/7/2023     Scheduling Instructions:      1) Due to current limited availability of the COVID-19 test, tests will be prioritized based on responses to questions above. Testing may be delayed due to volume. 2) Print and instruct patient to adhere to CDC home isolation program. (Link Above)              3) Set up or refer patient for a monitoring program.              4) Have patient sign up for and leverage MyChart (if not previously done). Order Specific Question:   Is this test for diagnosis or screening? Answer:   Diagnosis of ill patient     Order Specific Question:   Symptomatic for COVID-19 as defined by CDC? Answer:   Yes     Order Specific Question:   Date of Symptom Onset     Answer:   9/4/2022     Order Specific Question:   Hospitalized for COVID-19? Answer:   Unknown     Order Specific Question:   Admitted to ICU for COVID-19? Answer:   Unknown     Order Specific Question:   Employed in healthcare setting? Answer:   Unknown     Order Specific Question:   Resident in a congregate (group) care setting? Answer:   Unknown     Order Specific Question:   Pregnant? Answer:   Unknown     Order Specific Question:   Previously tested for COVID-19?      Answer:   Unknown    EKG 12 lead     Standing Status:   Future     Number of Occurrences:   1     Standing Expiration Date:   11/6/2022     Order Specific Question:   Reason for Exam?     Answer:   Chest pain        No results found for this visit on 09/07/22. Return if symptoms worsen or fail to improve.     Electronically signed by AIDEE Ely CNP on 09/08/22 at 7:43 AM.

## 2022-09-08 LAB
EKG ATRIAL RATE: 73 BPM
EKG P AXIS: 38 DEGREES
EKG P-R INTERVAL: 148 MS
EKG Q-T INTERVAL: 368 MS
EKG QRS DURATION: 86 MS
EKG QTC CALCULATION (BAZETT): 405 MS
EKG R AXIS: 29 DEGREES
EKG T AXIS: 33 DEGREES
EKG VENTRICULAR RATE: 73 BPM
SARS-COV-2: ABNORMAL
SARS-COV-2: DETECTED
SOURCE: ABNORMAL

## 2022-09-08 PROCEDURE — 93010 ELECTROCARDIOGRAM REPORT: CPT | Performed by: INTERNAL MEDICINE

## 2022-09-09 ENCOUNTER — HOSPITAL ENCOUNTER (OUTPATIENT)
Dept: INFUSION THERAPY | Age: 67
Discharge: HOME | End: 2022-09-09
Payer: MEDICARE

## 2022-09-09 VITALS
HEART RATE: 57 BPM | HEIGHT: 64 IN | WEIGHT: 200 LBS | OXYGEN SATURATION: 95 % | DIASTOLIC BLOOD PRESSURE: 59 MMHG | RESPIRATION RATE: 16 BRPM | SYSTOLIC BLOOD PRESSURE: 124 MMHG | BODY MASS INDEX: 34.15 KG/M2 | TEMPERATURE: 98.2 F

## 2022-09-09 DIAGNOSIS — U07.1 COVID-19: ICD-10-CM

## 2022-09-09 DIAGNOSIS — U07.1 COVID-19: Primary | ICD-10-CM

## 2022-09-09 PROCEDURE — 2580000003 HC RX 258: Performed by: NURSE PRACTITIONER

## 2022-09-09 PROCEDURE — M0222 HC BEBTELOVIMAB INJECTION: HCPCS

## 2022-09-09 PROCEDURE — 6360000002 HC RX W HCPCS: Performed by: NURSE PRACTITIONER

## 2022-09-09 RX ORDER — SODIUM CHLORIDE 0.9 % (FLUSH) 0.9 %
5-40 SYRINGE (ML) INJECTION PRN
Status: CANCELLED | OUTPATIENT
Start: 2022-09-09

## 2022-09-09 RX ORDER — EPINEPHRINE 1 MG/ML
0.3 INJECTION, SOLUTION, CONCENTRATE INTRAVENOUS PRN
OUTPATIENT
Start: 2022-09-09

## 2022-09-09 RX ORDER — SODIUM CHLORIDE 9 MG/ML
5-250 INJECTION, SOLUTION INTRAVENOUS PRN
OUTPATIENT
Start: 2022-09-09

## 2022-09-09 RX ORDER — SODIUM CHLORIDE 9 MG/ML
INJECTION, SOLUTION INTRAVENOUS CONTINUOUS
OUTPATIENT
Start: 2022-09-09

## 2022-09-09 RX ORDER — SODIUM CHLORIDE 0.9 % (FLUSH) 0.9 %
5-40 SYRINGE (ML) INJECTION PRN
Status: DISCONTINUED | OUTPATIENT
Start: 2022-09-09 | End: 2022-09-10 | Stop reason: HOSPADM

## 2022-09-09 RX ORDER — ALBUTEROL SULFATE 90 UG/1
4 AEROSOL, METERED RESPIRATORY (INHALATION) PRN
OUTPATIENT
Start: 2022-09-09

## 2022-09-09 RX ORDER — DIPHENHYDRAMINE HYDROCHLORIDE 50 MG/ML
50 INJECTION INTRAMUSCULAR; INTRAVENOUS
OUTPATIENT
Start: 2022-09-09

## 2022-09-09 RX ORDER — BEBTELOVIMAB 87.5 MG/ML
175 INJECTION, SOLUTION INTRAVENOUS ONCE
Status: COMPLETED | OUTPATIENT
Start: 2022-09-09 | End: 2022-09-09

## 2022-09-09 RX ORDER — ACETAMINOPHEN 325 MG/1
650 TABLET ORAL
OUTPATIENT
Start: 2022-09-09

## 2022-09-09 RX ORDER — ONDANSETRON 2 MG/ML
8 INJECTION INTRAMUSCULAR; INTRAVENOUS
OUTPATIENT
Start: 2022-09-09

## 2022-09-09 RX ORDER — SODIUM CHLORIDE 9 MG/ML
INJECTION, SOLUTION INTRAVENOUS CONTINUOUS
Status: CANCELLED | OUTPATIENT
Start: 2022-09-09

## 2022-09-09 RX ORDER — BEBTELOVIMAB 87.5 MG/ML
175 INJECTION, SOLUTION INTRAVENOUS ONCE
Status: CANCELLED | OUTPATIENT
Start: 2022-09-09 | End: 2022-09-09

## 2022-09-09 RX ORDER — SODIUM CHLORIDE 9 MG/ML
INJECTION, SOLUTION INTRAVENOUS CONTINUOUS
Status: DISCONTINUED | OUTPATIENT
Start: 2022-09-09 | End: 2022-09-10 | Stop reason: HOSPADM

## 2022-09-09 RX ADMIN — BEBTELOVIMAB 175 MG: 87.5 INJECTION, SOLUTION INTRAVENOUS at 08:32

## 2022-09-09 RX ADMIN — SODIUM CHLORIDE: 9 INJECTION, SOLUTION INTRAVENOUS at 08:31

## 2022-09-09 RX ADMIN — SODIUM CHLORIDE, PRESERVATIVE FREE 10 ML: 5 INJECTION INTRAVENOUS at 08:36

## 2022-09-09 ASSESSMENT — PAIN SCALES - GENERAL
PAINLEVEL_OUTOF10: 4
PAINLEVEL_OUTOF10: 4

## 2022-09-09 ASSESSMENT — PAIN DESCRIPTION - DESCRIPTORS
DESCRIPTORS: ACHING
DESCRIPTORS: ACHING

## 2022-09-09 ASSESSMENT — PAIN DESCRIPTION - FREQUENCY
FREQUENCY: CONTINUOUS
FREQUENCY: CONTINUOUS

## 2022-09-09 ASSESSMENT — PAIN DESCRIPTION - LOCATION
LOCATION: HEAD
LOCATION: HEAD

## 2022-09-09 ASSESSMENT — PAIN DESCRIPTION - PAIN TYPE: TYPE: ACUTE PAIN

## 2022-09-09 NOTE — DISCHARGE INSTRUCTIONS
Reviewed FDA EUA Fact sheet and After Infusion Information sheet for discharge. Written copies provided to patient. Verbalized understanding. Encouraged PO fluids and rest. DC home with . .. You received an infusion of an authorized for emergency use by the FDA. You should continue to self-isolate and use infection control measures (wear mask, isolate, social distance, avoid sharing personal items, clean and disinfect \"high touch\" surfaces, and frequent handwashing) according to CDC guidelines. Report all changes in your health to your doctors as soon as possible. Symptoms to report to your physician immediately or seek emergency medical care:    Fever, Chills, Shakes, Hives, Rash, Itching, Swelling of lips, mouth or throat    Shortness of breath, difficulty breathing or wheezing, Chest pain    Cough, Sneezing    Fatigue, muscle or joint pain/aching,    Headache    Weakness, numbness, tingling is any part of your body    Low blood pressure/Oxygen saturation levels   Dizziness, feeling faint    Nausea        These are not all of the possible side effects. Serious and unexpected side effects may happen.

## 2022-10-31 ENCOUNTER — HOSPITAL ENCOUNTER (OUTPATIENT)
Age: 67
Discharge: HOME | End: 2022-10-31
Payer: MEDICARE

## 2022-10-31 DIAGNOSIS — I10 ESSENTIAL HYPERTENSION: ICD-10-CM

## 2022-10-31 DIAGNOSIS — R73.9 HYPERGLYCEMIA: ICD-10-CM

## 2022-10-31 DIAGNOSIS — E78.5 HYPERLIPIDEMIA, UNSPECIFIED HYPERLIPIDEMIA TYPE: ICD-10-CM

## 2022-10-31 LAB
ABSOLUTE EOS #: 0.12 K/UL (ref 0–0.44)
ABSOLUTE IMMATURE GRANULOCYTE: <0.03 K/UL (ref 0–0.3)
ABSOLUTE LYMPH #: 2.43 K/UL (ref 1.1–3.7)
ABSOLUTE MONO #: 0.67 K/UL (ref 0.1–1.2)
ALT SERPL-CCNC: 34 U/L (ref 5–33)
ANION GAP SERPL CALCULATED.3IONS-SCNC: 9 MMOL/L (ref 9–17)
AST SERPL-CCNC: 24 U/L
BASOPHILS # BLD: 0 % (ref 0–2)
BASOPHILS ABSOLUTE: <0.03 K/UL (ref 0–0.2)
BUN BLDV-MCNC: 10 MG/DL (ref 8–23)
BUN/CREAT BLD: 13 (ref 9–20)
CALCIUM SERPL-MCNC: 9.5 MG/DL (ref 8.6–10.4)
CHLORIDE BLD-SCNC: 104 MMOL/L (ref 98–107)
CHOLESTEROL/HDL RATIO: 2.7
CHOLESTEROL: 116 MG/DL
CO2: 30 MMOL/L (ref 20–31)
CREAT SERPL-MCNC: 0.75 MG/DL (ref 0.5–0.9)
EOSINOPHILS RELATIVE PERCENT: 2 % (ref 1–4)
ESTIMATED AVERAGE GLUCOSE: 126 MG/DL
GFR SERPL CREATININE-BSD FRML MDRD: >60 ML/MIN/1.73M2
GLUCOSE BLD-MCNC: 99 MG/DL (ref 70–99)
HBA1C MFR BLD: 6 % (ref 4–6)
HCT VFR BLD CALC: 43 % (ref 36.3–47.1)
HDLC SERPL-MCNC: 43 MG/DL
HEMOGLOBIN: 13.7 G/DL (ref 11.9–15.1)
HIGH SENSITIVE C-REACTIVE PROTEIN: 5.3 MG/L
IMMATURE GRANULOCYTES: 0 %
LDL CHOLESTEROL: 55 MG/DL (ref 0–130)
LYMPHOCYTES # BLD: 36 % (ref 24–43)
MCH RBC QN AUTO: 28.8 PG (ref 25.2–33.5)
MCHC RBC AUTO-ENTMCNC: 31.9 G/DL (ref 28.4–34.8)
MCV RBC AUTO: 90.5 FL (ref 82.6–102.9)
MONOCYTES # BLD: 10 % (ref 3–12)
NRBC AUTOMATED: 0 PER 100 WBC
PDW BLD-RTO: 13 % (ref 11.8–14.4)
PLATELET # BLD: 203 K/UL (ref 138–453)
PMV BLD AUTO: 9.8 FL (ref 8.1–13.5)
POTASSIUM SERPL-SCNC: 4 MMOL/L (ref 3.7–5.3)
RBC # BLD: 4.75 M/UL (ref 3.95–5.11)
SEG NEUTROPHILS: 52 % (ref 36–65)
SEGMENTED NEUTROPHILS ABSOLUTE COUNT: 3.58 K/UL (ref 1.5–8.1)
SODIUM BLD-SCNC: 143 MMOL/L (ref 135–144)
TRIGL SERPL-MCNC: 88 MG/DL
WBC # BLD: 6.8 K/UL (ref 3.5–11.3)

## 2022-10-31 PROCEDURE — 84460 ALANINE AMINO (ALT) (SGPT): CPT

## 2022-10-31 PROCEDURE — 86141 C-REACTIVE PROTEIN HS: CPT

## 2022-10-31 PROCEDURE — 83036 HEMOGLOBIN GLYCOSYLATED A1C: CPT

## 2022-10-31 PROCEDURE — 80061 LIPID PANEL: CPT

## 2022-10-31 PROCEDURE — 80048 BASIC METABOLIC PNL TOTAL CA: CPT

## 2022-10-31 PROCEDURE — 85025 COMPLETE CBC W/AUTO DIFF WBC: CPT

## 2022-10-31 PROCEDURE — 36415 COLL VENOUS BLD VENIPUNCTURE: CPT

## 2022-10-31 PROCEDURE — 84450 TRANSFERASE (AST) (SGOT): CPT

## 2022-11-08 ENCOUNTER — OFFICE VISIT (OUTPATIENT)
Dept: PRIMARY CARE CLINIC | Age: 67
End: 2022-11-08
Payer: MEDICARE

## 2022-11-08 VITALS
DIASTOLIC BLOOD PRESSURE: 80 MMHG | HEIGHT: 64 IN | WEIGHT: 201 LBS | SYSTOLIC BLOOD PRESSURE: 136 MMHG | HEART RATE: 74 BPM | BODY MASS INDEX: 34.31 KG/M2 | RESPIRATION RATE: 14 BRPM | TEMPERATURE: 97.5 F | OXYGEN SATURATION: 98 %

## 2022-11-08 DIAGNOSIS — I34.1 MVP (MITRAL VALVE PROLAPSE): ICD-10-CM

## 2022-11-08 DIAGNOSIS — Q23.3 CONGENITAL MITRAL INSUFFICIENCY: ICD-10-CM

## 2022-11-08 DIAGNOSIS — E78.5 HYPERLIPIDEMIA, UNSPECIFIED HYPERLIPIDEMIA TYPE: ICD-10-CM

## 2022-11-08 DIAGNOSIS — I35.0 MILD AORTIC STENOSIS: ICD-10-CM

## 2022-11-08 DIAGNOSIS — Z12.31 ENCOUNTER FOR SCREENING MAMMOGRAM FOR MALIGNANT NEOPLASM OF BREAST: ICD-10-CM

## 2022-11-08 DIAGNOSIS — I10 ESSENTIAL HYPERTENSION: Primary | ICD-10-CM

## 2022-11-08 DIAGNOSIS — I34.0 MODERATE MITRAL REGURGITATION: ICD-10-CM

## 2022-11-08 DIAGNOSIS — R06.02 SOB (SHORTNESS OF BREATH) ON EXERTION: ICD-10-CM

## 2022-11-08 DIAGNOSIS — R73.03 PREDIABETES: ICD-10-CM

## 2022-11-08 PROCEDURE — 90471 IMMUNIZATION ADMIN: CPT | Performed by: NURSE PRACTITIONER

## 2022-11-08 PROCEDURE — 3074F SYST BP LT 130 MM HG: CPT | Performed by: NURSE PRACTITIONER

## 2022-11-08 PROCEDURE — 3078F DIAST BP <80 MM HG: CPT | Performed by: NURSE PRACTITIONER

## 2022-11-08 PROCEDURE — 1123F ACP DISCUSS/DSCN MKR DOCD: CPT | Performed by: NURSE PRACTITIONER

## 2022-11-08 PROCEDURE — 90715 TDAP VACCINE 7 YRS/> IM: CPT | Performed by: NURSE PRACTITIONER

## 2022-11-08 PROCEDURE — 99214 OFFICE O/P EST MOD 30 MIN: CPT | Performed by: NURSE PRACTITIONER

## 2022-11-08 RX ORDER — FUROSEMIDE 40 MG/1
TABLET ORAL
Qty: 90 TABLET | Refills: 3 | Status: SHIPPED | OUTPATIENT
Start: 2022-11-08

## 2022-11-08 ASSESSMENT — ENCOUNTER SYMPTOMS: SHORTNESS OF BREATH: 1

## 2022-11-08 NOTE — PROGRESS NOTES
Name: Johny Andrade  : 1955         Chief Complaint:     Chief Complaint   Patient presents with    Hypertension     She is not exercising. She is adherent to a low-salt diet. Blood pressure is not being monitored at home. Hyperlipidemia     Medication compliance: compliant all of the time. Patient is  following a low fat, low cholesterol diet. She is not exercising regularly. Other     Hyperglycemia :   Diet compliance: not compliant   Current exercise: none       History of Present Illness:      Johny Andrade is a 78 y.o.  female who presents with Hypertension (She is not exercising./She is adherent to a low-salt diet.  /Blood pressure is not being monitored at home. ), Hyperlipidemia (Medication compliance: compliant all of the time. /Patient is  following a low fat, low cholesterol diet.  Dorisann Mary is not exercising regularly.), and Other (Hyperglycemia : /Diet compliance: not compliant /Current exercise: none)      HPI    Hypertension:  Current medication regimen includes lasix 40mg QD. She is not monitoring her blood pressure at home. At-home blood pressure is averaging N/A. She admits following a low-sodium diet. She denies chest pain, vision changes, lightheadedness, or dizziness. Admits SOB when vacuuming the floor. Admits sinus headaches. Admits palpitations when she lies down, occurring daily. EKG 2022 normal sinus rhythm, normal ECG. She drinks decaf coffee. Echo 2022 ejection fraction 60%, aortic leaflet calcification with mild aortic stenosis, mild mitral and tricuspid regurgitation, mild diastolic dysfunction. She does note some hand \"puffiness\". Hyperlipidemia:  Current treatment includes atorvastatin 20mg QD. Admits daily medication compliance. Denies side effects. Prediabetes:  Current treatment includes none. She admits well balanced diet. She admits baking and having increased dessert intake. For exercise she notes walking, but not daily.        Past Medical History:     Past Medical History:   Diagnosis Date    Allergic rhinitis 1992    Aphthous ulcer of mouth     DDD (degenerative disc disease), cervical 2013    H/O echocardiogram 08/11/2015    EF 65%. Aortic valve sclerosis without stenosis. Mild aortic regurgitation. Myxomatous thickening of the mitral leaflets with Moderate mitral regurgitation. Mils (grade l) diastolic dysfunction. H/O echocardiogram 09/02/2016    EF >60%. Normal LV wall thickness and cavity size. No definite specific wall motion abnormalities were identified. Left atrium is mildly dilated (29-33) left atrial volume index of 31 ml/m2. Mild aortic stenosis. Myxomatous thickening of the mitral leaflets with moderate eccentric mitral regurg. Mild treicuspid regurg. Mild diastolic dysfunction is seen. H/O echocardiogram 08/06/2018    EF >55%. The LV wall thickness is mildly incrased. Moderate mitral regurg. Mild aortic stenosis. Mild aortic regurg. Mild tricuspid regurg. Evidence of mild (grade I) diastolic dysfucntion is seen.      Hyperglycemia 2013    Hyperlipidemia     Hypertension     Mitral regurgitation 2010    MVP (mitral valve prolapse) 2010    Obesity 2010    Pulmonary hypertension ([address] 2013    Sleep apnea 2012    Uterine fibroid 2008    Venous insufficiency 2013    Wears glasses       Reviewed all health maintenance requirements and ordered appropriate tests  Health Maintenance Due   Topic Date Due    Shingles vaccine (1 of 2) Never done    COVID-19 Vaccine (4 - Booster for Pfizer series) 01/07/2022    Breast cancer screen  12/03/2022       Past Surgical History:     Past Surgical History:   Procedure Laterality Date    ABDOMEN SURGERY  10/12/2018    pelvic mass removed     COLONOSCOPY  08/13/2018    -polyp(benign lymphoid)hemorrhoids    COLONOSCOPY N/A 8/13/2018    COLONOSCOPY POLYPECTOMY SNARE/COLD BIOPSY performed by Luis Bauer MD at [address] [place] [address]  2008    Dr. Raine López HYSTERECTOMY (CERVIX STATUS UNKNOWN)  2009    HYSTERECTOMY (CERVIX STATUS UNKNOWN)  2009    supracervical, BSO with cervical conization, Dr. George Lou ESWL Left 12/4/2018    [facility] [address] LITHOTRIPSY performed by Santana Jackson MD at [address]        Medications:       Prior to Admission medications    Medication Sig Start Date End Date Taking? Authorizing Provider   furosemide (LASIX) 40 MG tablet TAKE 1 TABLET BY MOUTH EVERY DAY 11/8/22  Yes AIDEE Farnsworth CNP   atorvastatin (LIPITOR) 20 MG tablet TAKE 1 TABLET BY MOUTH EVERY DAY 9/2/22  Yes AIDEE Farnsworth CNP   pantoprazole (PROTONIX) 40 MG tablet TAKE 1 TABLET BY MOUTH EVERY DAY BEFORE BREAKFAST 8/2/22  Yes AIDEE Farnsworth CNP   diclofenac sodium (VOLTAREN) 1 % GEL APPLY 2G TOPICALLY 2 TIMES DAILY 12/7/21  Yes Magda Escobedo MD   aspirin 81 MG tablet Take 81 mg by mouth daily   Yes Historical Provider, MD   calcium carbonate (OSCAL) 500 MG TABS tablet Take 500 mg by mouth daily   Yes Historical Provider, MD   Multiple Vitamins-Minerals (ALIVE ONCE DAILY WOMENS 50+ PO) Take by mouth   Yes Historical Provider, MD   Ascorbic Acid (VITAMIN C) 250 MG tablet Take 250 mg by mouth daily   Yes Historical Provider, MD        Allergies:       Seasonal    Social History:     Tobacco:    reports that she has never smoked. She has never used smokeless tobacco.  Alcohol:      reports no history of alcohol use. Drug Use:  reports no history of drug use. Family History:     Family History   Problem Relation Age of Onset    High Blood Pressure Father     Heart Disease Father     Stroke Father     Dementia Mother     Other Mother         thoracic AAA    Cancer Mother         bilateral kidney       Review of Systems:     Positive and Negative as described in HPI    Review of Systems   Eyes:  Negative for visual disturbance.    Respiratory:  Positive for shortness of breath. Cardiovascular:  Positive for palpitations. Negative for chest pain. Neurological:  Positive for headaches (sinus headache). Negative for dizziness and light-headedness. Physical Exam:   Vitals:  /80   Pulse 74   Temp 97.5 °F (36.4 °C)   Resp 14   Ht 5' 4\" (1.626 m)   Wt 201 lb (91.2 kg)   LMP  (LMP Unknown)   SpO2 98%   BMI 34.50 kg/m²     Physical Exam  Constitutional:       General: She is not in acute distress. Appearance: Normal appearance. She is obese. She is not ill-appearing or toxic-appearing. HENT:      Head: Normocephalic. Right Ear: Tympanic membrane, ear canal and external ear normal. There is no impacted cerumen. Left Ear: Tympanic membrane, ear canal and external ear normal. There is no impacted cerumen. Nose: Nose normal. No congestion or rhinorrhea. Mouth/Throat:      Mouth: Mucous membranes are moist.      Pharynx: No oropharyngeal exudate or posterior oropharyngeal erythema. Cardiovascular:      Rate and Rhythm: Normal rate and regular rhythm. Heart sounds: Murmur (left sternal border) heard. Pulmonary:      Effort: Pulmonary effort is normal. No respiratory distress. Breath sounds: Normal breath sounds. No stridor. No wheezing, rhonchi or rales. Musculoskeletal:      Cervical back: Neck supple. Lymphadenopathy:      Cervical: No cervical adenopathy. Neurological:      Mental Status: She is alert and oriented to person, place, and time. Psychiatric:         Mood and Affect: Mood normal.         Behavior: Behavior normal.         Thought Content:  Thought content normal.         Judgment: Judgment normal.       Data:     Lab Results   Component Value Date/Time     10/31/2022 07:52 AM    K 4.0 10/31/2022 07:52 AM     10/31/2022 07:52 AM    CO2 30 10/31/2022 07:52 AM    BUN 10 10/31/2022 07:52 AM    CREATININE 0.75 10/31/2022 07:52 AM    GLUCOSE 99 10/31/2022 07:52 AM    GLUCOSE 98 05/08/2012 02:33 PM    PROT 7.0 06/22/2018 09:13 AM    LABALBU 3.7 06/22/2018 09:13 AM    LABALBU 4.2 05/08/2012 02:33 PM    BILITOT 0.38 06/22/2018 09:13 AM    ALKPHOS 101 06/22/2018 09:13 AM    AST 24 10/31/2022 07:52 AM    ALT 34 10/31/2022 07:52 AM     Lab Results   Component Value Date/Time    WBC 6.8 10/31/2022 07:52 AM    RBC 4.75 10/31/2022 07:52 AM    RBC 4.52 05/08/2012 02:33 PM    HGB 13.7 10/31/2022 07:52 AM    HCT 43.0 10/31/2022 07:52 AM    MCV 90.5 10/31/2022 07:52 AM    MCH 28.8 10/31/2022 07:52 AM    MCHC 31.9 10/31/2022 07:52 AM    RDW 13.0 10/31/2022 07:52 AM     10/31/2022 07:52 AM     05/08/2012 02:33 PM    MPV 9.8 10/31/2022 07:52 AM     Lab Results   Component Value Date/Time    TSH 2.20 07/15/2015 09:11 AM     Lab Results   Component Value Date/Time    CHOL 116 10/31/2022 07:52 AM    HDL 43 10/31/2022 07:52 AM    LABA1C 6.0 10/31/2022 07:52 AM       Assessment/Plan:      Diagnosis Orders   1. Essential hypertension  CBC    Comprehensive Metabolic Panel      2. Encounter for screening mammogram for malignant neoplasm of breast  [facility] D[facility] SCREEN BILATERAL      3. MVP (mitral valve prolapse)  Sharla Vieyra MD, Cardiology, [place]      4. Congenital mitral insufficiency  Nadeen Ivy MD, Cardiology, [place]      5. Moderate mitral regurgitation  Nadeen Ivy MD, Cardiology, [place]      6. Mild aortic stenosis  Sharla Vieyra MD, Cardiology, [place]      7. Hyperlipidemia, unspecified hyperlipidemia type  Sharla Vieyra MD, Cardiology, [place]    Lipid Panel      8. SOB (shortness of breath) on exertion  Nadeen Ivy MD, Cardiology, [place]      9.  Prediabetes  Hemoglobin A1C        Wellness:  - Mammogram ordered  - Tetanus vaccine administered today in office    Prediabetes:  - Reviewed lab work 10/2022 showing A1c 6.0%  - Counseled on diabetic diet and routine physical activity  - Offered referral to dietitian, patient declines  - Repeat A1c 3 months  - Printed education regarding diabetic diet supplied to patient  - Encourage glycemic index book by Jamel troncoso    Hyperlipidemia:  - Reviewed lipid panel 10/2022, WNL  - Continue atorvastatin 20 mg daily    Hypertension:  - Stable. Continue Lasix 40 mg daily. Of note, she does struggle with peripheral edema which is aided by the Lasix. Reviewed renal function and electrolytes 10/2022, WNL.  - Reviewed lifestyle modifications to assist with lowering blood pressure including weight loss, healthy diet, exercising routinely, low-sodium diet, limiting caffeine and alcohol, and encouraging stress relief techniques.     Cardiac:  - Patient was previously following with Dr. Prakash Ayoub cardiology, but did not return due to scheduling conflicts  - Given her history of mitral valve prolapse, mild aortic stenosis, mild diastolic dysfunction, shortness of breath on exertion, and daily palpitations will refer patient back to cardiology  -- Reviewed most recent EKG and echocardiogram, as noted above      Completed Refills   Requested Prescriptions     Signed Prescriptions Disp Refills    furosemide (LASIX) 40 MG tablet 90 tablet 3     Sig: TAKE 1 TABLET BY MOUTH EVERY DAY       Orders Placed This Encounter   Procedures    [facility] DIGITAL SCREEN BILATERAL     Standing Status:   Future     Standing Expiration Date:   1/8/2024    CBC     Standing Status:   Future     Standing Expiration Date:   11/8/2023    Comprehensive Metabolic Panel     Standing Status:   Future     Standing Expiration Date:   11/8/2023    Hemoglobin A1C     Standing Status:   Future     Standing Expiration Date:   11/8/2023    Lipid Panel     Standing Status:   Future     Standing Expiration Date:   11/8/2023    David Zaragoza MD, Cardiology, [place]     Referral Priority:   Routine     Referral Type:   Eval and Treat     Referral Reason:   Specialty Services Required     Referred to Provider:   Lakeisha Fowler MD     Requested Specialty:   Cardiology     Number of Visits Requested:   1        No results found for this visit on 11/08/22. Return in about 3 months (around 2/8/2023), or if symptoms worsen or fail to improve, for routine follow up .     Electronically signed by AIDEE Simmons CNP on 11/08/22 at 12:30 PM.

## 2022-11-08 NOTE — PATIENT INSTRUCTIONS
SURVEY:    You may be receiving a survey from [employer] regarding your visit today. Please complete the survey to enable us to provide the highest quality of care to you and your family. If you cannot score us a very good on any question, please call the office to discuss how we could of made your experience a very good one. Thank you.

## 2022-12-29 ENCOUNTER — HOSPITAL ENCOUNTER (OUTPATIENT)
Dept: WOMENS IMAGING | Age: 67
Discharge: HOME | End: 2022-12-31
Payer: MEDICARE

## 2022-12-29 DIAGNOSIS — Z12.31 ENCOUNTER FOR SCREENING MAMMOGRAM FOR MALIGNANT NEOPLASM OF BREAST: ICD-10-CM

## 2022-12-29 PROCEDURE — 77063 BREAST TOMOSYNTHESIS BI: CPT

## 2023-02-01 ENCOUNTER — OFFICE VISIT (OUTPATIENT)
Dept: CARDIOLOGY | Age: 68
End: 2023-02-01
Payer: MEDICARE

## 2023-02-01 VITALS
RESPIRATION RATE: 18 BRPM | HEIGHT: 64 IN | OXYGEN SATURATION: 99 % | DIASTOLIC BLOOD PRESSURE: 67 MMHG | WEIGHT: 200.2 LBS | HEART RATE: 80 BPM | BODY MASS INDEX: 34.18 KG/M2 | SYSTOLIC BLOOD PRESSURE: 134 MMHG

## 2023-02-01 DIAGNOSIS — E66.9 CLASS 1 OBESITY WITHOUT SERIOUS COMORBIDITY WITH BODY MASS INDEX (BMI) OF 34.0 TO 34.9 IN ADULT, UNSPECIFIED OBESITY TYPE: ICD-10-CM

## 2023-02-01 DIAGNOSIS — R06.02 SOB (SHORTNESS OF BREATH): Primary | ICD-10-CM

## 2023-02-01 DIAGNOSIS — R94.31 ABNORMAL EKG: ICD-10-CM

## 2023-02-01 DIAGNOSIS — R00.2 PALPITATION: ICD-10-CM

## 2023-02-01 DIAGNOSIS — I35.0 MILD AORTIC STENOSIS: ICD-10-CM

## 2023-02-01 DIAGNOSIS — I10 ESSENTIAL HYPERTENSION: ICD-10-CM

## 2023-02-01 PROCEDURE — 99204 OFFICE O/P NEW MOD 45 MIN: CPT | Performed by: FAMILY MEDICINE

## 2023-02-01 PROCEDURE — 1123F ACP DISCUSS/DSCN MKR DOCD: CPT | Performed by: FAMILY MEDICINE

## 2023-02-01 PROCEDURE — 3078F DIAST BP <80 MM HG: CPT | Performed by: FAMILY MEDICINE

## 2023-02-01 PROCEDURE — 3075F SYST BP GE 130 - 139MM HG: CPT | Performed by: FAMILY MEDICINE

## 2023-02-01 RX ORDER — MULTIVIT-MIN/IRON/FOLIC ACID/K 18-600-40
CAPSULE ORAL
COMMUNITY

## 2023-02-01 NOTE — PROGRESS NOTES
I, Geovani Mcallister am scribing for and in the presence of Eddie Bailey MD, MS, F.A.C.C..        Subjective:     CHIEF COMPLAINT / HPI:    Chief Complaint   Patient presents with    Establish Cardiologist     Hx:mitral regurg Pt is here to est care today she does have sob, heart racing, occasional lightheaded/dizziness denies any falls. Denies:CP     Dear Dr. Christinia Cabot, APRN - CNP    I had the pleasure of seeing Andres Alvarenga in consultation today. Ananya Khan is 75 y.o. female with past medical history of hyperlipidemia, hypertension and mitral regurgitation. No history of coronary artery disease, myocardial infarction, heart failure or significant arrhythmia. Her father had heart disease starting at 54years old. She is former smoker, 1 ppk a week. Negative stress Echo in 2015. Her risk factors for coronary artery disease includes hypertension, hyperlipidemia and family history of premature coronary artery disease. ECG done in office 9/17/2019- Normal sinus rhythm, normal ECG. Echo done on 8/6/2018- EF >55%. The LV wall thickness is mildly incrased. Moderate mitral regurg. Mild aortic stenosis. Mild aortic regurg. Mild tricuspid regurg. Evidence of mild (grade I) diastolic dysfucntion is seen. Ms. Jolly Doe is here to establish care. She states she has some worsening shortness of breath, orthopnea or PND. She has to take more breaks with exertion. Occasional palpitations at times and occasional lightheaded or dizziness. She does feel a pulling chest discomfort occasionally. No history of consciousness. She does have problems with her legs swelling but worse in the summer. Denies chest pain, pressure or tightness. Denies any fever or cough. Denies any black tarry stools, blood in urine or stools. Exercise Tolerance: Ms. Jolly Doe reports that she has a fairly poor exercise tolerance.  Her says that she could walk about 1/2 mile without developing chest discomfort but does say that significant shortness of breath would stop her from going further as well as moderate palpitations and palpitation related chest pressure. Past Medical History:    Past Medical History:   Diagnosis Date    Allergic rhinitis 1992    Aphthous ulcer of mouth     DDD (degenerative disc disease), cervical 2013    H/O echocardiogram 08/11/2015    EF 65%. Aortic valve sclerosis without stenosis. Mild aortic regurgitation. Myxomatous thickening of the mitral leaflets with Moderate mitral regurgitation. Mils (grade l) diastolic dysfunction. H/O echocardiogram 09/02/2016    EF >60%. Normal LV wall thickness and cavity size. No definite specific wall motion abnormalities were identified. Left atrium is mildly dilated (29-33) left atrial volume index of 31 ml/m2. Mild aortic stenosis. Myxomatous thickening of the mitral leaflets with moderate eccentric mitral regurg. Mild treicuspid regurg. Mild diastolic dysfunction is seen. H/O echocardiogram 08/06/2018    EF >55%. The LV wall thickness is mildly incrased. Moderate mitral regurg. Mild aortic stenosis. Mild aortic regurg. Mild tricuspid regurg. Evidence of mild (grade I) diastolic dysfucntion is seen.      Hyperglycemia 2013    Hyperlipidemia     Hypertension     Mitral regurgitation 2010    MVP (mitral valve prolapse) 2010    Obesity 2010    Pulmonary hypertension ([address] 2013    Sleep apnea 2012    Uterine fibroid 2008    Venous insufficiency 2013    Wears glasses      Past Surgical History:  Past Surgical History:   Procedure Laterality Date    ABDOMEN SURGERY  10/12/2018    pelvic mass removed     COLONOSCOPY  08/13/2018    -polyp(benign lymphoid)hemorrhoids    COLONOSCOPY N/A 8/13/2018    COLONOSCOPY POLYPECTOMY SNARE/COLD BIOPSY performed by Vic Shields MD at [place] [address]  2008    Dr. Rajiv Humphreys ([address] [place] [address])  2009    HYSTERECTOMY (CERVIX STATUS UNKNOWN)  2009    supracervical, BSO with cervical Dr. Abbey hidalgo Dr. Sessions Left 12/4/2018    ESWL [address] LITHOTRIPSY performed by Cecelia Flores MD at [address] History:    Social History     Tobacco Use   Smoking Status Never   Smokeless Tobacco Never     Family History   Problem Relation Age of Onset    High Blood Pressure Father     Heart Disease Father     Stroke Father     Dementia Mother     Other Mother         thoracic AAA    Cancer Mother         bilateral kidney     Current Medications:  Outpatient Medications Marked as Taking for the 2/1/23 encounter (Office Visit) with Anita Long MD   Medication Sig Dispense Refill    Cholecalciferol (VITAMIN D) 50 MCG (2000 UT) CAPS capsule Take by mouth      furosemide (LASIX) 40 MG tablet TAKE 1 TABLET BY MOUTH EVERY DAY 90 tablet 3    atorvastatin (LIPITOR) 20 MG tablet TAKE 1 TABLET BY MOUTH EVERY DAY 90 tablet 3    pantoprazole (PROTONIX) 40 MG tablet TAKE 1 TABLET BY MOUTH EVERY DAY BEFORE BREAKFAST 90 tablet 1    diclofenac sodium (VOLTAREN) 1 % GEL APPLY 2G TOPICALLY 2 TIMES DAILY 100 g 3    aspirin 81 MG tablet Take 81 mg by mouth daily      calcium carbonate (OSCAL) 500 MG TABS tablet Take 500 mg by mouth daily      Multiple Vitamins-Minerals (ALIVE ONCE DAILY WOMENS 50+ PO) Take by mouth      Ascorbic Acid (VITAMIN C) 250 MG tablet Take 250 mg by mouth daily         REVIEW OF SYSTEMS:    CONSTITUTIONAL: No major weight gain or loss, fatigue, weakness, night sweats or fever. HEENT: No new vision difficulties or ringing in the ears. RESPIRATORY: See HPI  CARDIOVASCULAR: See HPI  GI: No nausea, vomiting, diarrhea, constipation, abdominal pain or changes in bowel habits. : No urinary frequency, urgency, incontinence hematuria or dysuria. SKIN: No cyanosis or skin lesions. MUSCULOSKELETAL: No new muscle or joint pain. NEUROLOGICAL: No syncope or TIA-like symptoms.   PSYCHIATRIC: No anxiety, pain, insomnia or depression    Objective:     Physical Examination:     /67 (Site: Left Upper Arm, Position: Sitting, Cuff Size: Large Adult)   Pulse 80   Resp 18   Ht 5' 4\" (1.626 m)   Wt 200 lb 3.2 oz (90.8 kg)   LMP  (LMP Unknown)   SpO2 99%   BMI 34.36 kg/m²  Body mass index is 34.36 kg/m². Constitutional: She appeared oriented to person and place. She appears well-developed and well-nourished. In no acute distress. HEENT: Normocephalic and atraumatic. No JVD present. Carotid bruit is not present. No mass and no thyromegaly present. No lymphadenopathy noted. Cardiovascular: Normal rate, regular rhythm, normal heart sounds. Exam reveals no gallop and no friction rubs. 3/6 systolic murmur, 2nd intercostal space on the RIGHT just lateral to the sternum  Pulmonary/Chest: Effort normal and breath sounds normal. No respiratory distress. She has no wheezes, rhonchi or rales. Abdominal: Soft, non-tender. She exhibits no organomegaly, mass or bruit. Extremities: Trace. No cyanosis or clubbing. 2+ radial and carotid pulses. Distal extremity pulses: 2+ bilaterally. Neurological: Alertness and orientation as per Constitutional exam. No evidence of gross cranial nerve deficit. Coordination appeared normal.   Skin: Skin is warm and dry. There is no rash or diaphoresis. Psychiatric: She has a normal mood and affect.  Her speech is normal and behavior is normal.       DATA:    Lab Results   Component Value Date    ALT 34 (H) 10/31/2022    AST 24 10/31/2022    ALKPHOS 101 06/22/2018    BILITOT 0.38 06/22/2018     Lab Results   Component Value Date    CREATININE 0.75 10/31/2022    BUN 10 10/31/2022     10/31/2022    K 4.0 10/31/2022     10/31/2022    CO2 30 10/31/2022     Lab Results   Component Value Date    TSH 2.20 07/15/2015    R2GAXBJ 8.9 07/15/2015     Lab Results   Component Value Date    WBC 6.8 10/31/2022    HGB 13.7 10/31/2022    HCT 43.0 10/31/2022    MCV 90.5 10/31/2022  10/31/2022       Lab Results   Component Value Date    TRIG 88 10/31/2022    TRIG 77 08/04/2021    TRIG 87 09/29/2020     Lab Results   Component Value Date    HDL 43 10/31/2022    HDL 47 08/04/2021    HDL 48 09/29/2020     Lab Results   Component Value Date    LDLCHOLESTEROL 55 10/31/2022    LDLCHOLESTEROL 54 08/04/2021    LDLCHOLESTEROL 44 09/29/2020         Assessment:      Diagnosis Orders   1. SOB (shortness of breath)  Stress test, lexiscan    Echo 2D w doppler w color complete    Continuous cardiac monitoring, >2 up to 14 days      2. Mild aortic stenosis  Stress test, lexiscan    Echo 2D w doppler w color complete    Continuous cardiac monitoring, >2 up to 14 days      3. Essential hypertension  Stress test, lexiscan    Echo 2D w doppler w color complete    Continuous cardiac monitoring, >2 up to 14 days      4. Palpitation  Stress test, lexiscan    Echo 2D w doppler w color complete    Continuous cardiac monitoring, >2 up to 14 days      5. Class 1 obesity without serious comorbidity with body mass index (BMI) of 34.0 to 34.9 in adult, unspecified obesity type  Stress test, lexiscan    Echo 2D w doppler w color complete    Continuous cardiac monitoring, >2 up to 14 days      6. Abnormal EKG  Continuous cardiac monitoring, >2 up to 14 days          Plan:     Non-Rheumatic Mitral Regurgitation and Non-Rheumatic Aortic Valve Stenosis:    Diuretics: Continue furosemide (lasix) 40 mg once daily. I also discussed the potential side effects of this medication including lightheadedness and dizziness and told her to call the office if this occurs. Antiplatelet Agent: Continue aspirin 81 mg daily. I also reminded her to watch for signs of blood in her stool or black tarry stools and stop the medication immediately if this develops as this could be life threatening. No evidence of volume overload or heart failure. Continue conservative management, will consider follow-up echo after 1 year.     Essential Hypertension: Controlled  Beta Blocker: Not indicated at this time. ACE Inibitor/ARB: Not indicated at this time. Calcium Channel Blocker: Not indicated at this time. Diuretics: Continue furosemide (Lasix) 40 mg every morning. Additional Testing List: I ordered a echocardiogram to better assess for the etiology of this problem and to help guide future management and Because current signs and symptoms can certainly be caused by significant coronary artery disease, I ordered a Regadenoson (Lexiscan) stress test with SPECT imaging to try and rule out this possibility. Shortness of breath with mild exertion:  Additional Testing List: I ordered a echocardiogram to better assess for the etiology of this problem and to help guide future management and Because current signs and symptoms can certainly be caused by significant coronary artery disease, I ordered a Regadenoson (Lexiscan) stress test with SPECT imaging to try and rule out this possibility. Recurrent intermittent palpitations: Moderately bothersome both with exertion and with laying down at night  Beta Blocker: Not indicated at this time. Calcium Channel Blocker: Not indicated at this time. Additional Testing List: I ordered an CAM MONITOR for one week to try and pinpoint the etiology of their symptoms    Obesity: Body mass index is 34.36 kg/m². I also briefly discussed both diet and exercise strategies for her to continue to loses weight and she was very receptive to this. Abnormal EKG: Additional Testing: Because of her current problems therefore I ordered a Lexiscan stress test with SPECT imaging to try and rule out this possibility. Finally, I recommended that she continue her other medications and follow up with you as previously scheduled. FOLLOW UP:   I told Ms. Saeid Holt to call my office if she had any problems, but otherwise told her to Return in about 6 weeks (around 3/15/2023).  However, I would be happy to see her sooner should the need arise. Sincerely,  Tani Hubbard. Ronny ROGERS, MS, F.A.C.C. [facility], [address] [facility] [address], [address]  Phone: [phone], Fax: [phone]    I believe that the risk of significant morbidity and mortality related to the patient's current medical conditions are: intermediate-high. The documentation recorded by the scribe, accurately and completely reflects the services I personally performed and the decisions made by me. Pushpa Mcmanus MD, MS, F.A.C.C.  February 1, 2023

## 2023-02-01 NOTE — PATIENT INSTRUCTIONS
SURVEY:    You may be receiving a survey from [employer] regarding your visit today. Please complete the survey to enable us to provide the highest quality of care to you and your family. If you cannot score us a very good on any question, please call the office to discuss how we could have made your experience a very good one. Thank you.

## 2023-02-08 ENCOUNTER — HOSPITAL ENCOUNTER (OUTPATIENT)
Age: 68
Discharge: HOME | End: 2023-02-08
Payer: MEDICARE

## 2023-02-08 DIAGNOSIS — I10 ESSENTIAL HYPERTENSION: ICD-10-CM

## 2023-02-08 DIAGNOSIS — R73.03 PREDIABETES: ICD-10-CM

## 2023-02-08 DIAGNOSIS — E78.5 HYPERLIPIDEMIA, UNSPECIFIED HYPERLIPIDEMIA TYPE: ICD-10-CM

## 2023-02-08 LAB
ALBUMIN SERPL-MCNC: 3.8 G/DL (ref 3.5–5.2)
ALBUMIN/GLOBULIN RATIO: 1.1 (ref 1–2.5)
ALP SERPL-CCNC: 112 U/L (ref 35–104)
ALT SERPL-CCNC: 28 U/L (ref 5–33)
ANION GAP SERPL CALCULATED.3IONS-SCNC: 6 MMOL/L (ref 9–17)
AST SERPL-CCNC: 21 U/L
BILIRUB SERPL-MCNC: 0.5 MG/DL (ref 0.3–1.2)
BUN SERPL-MCNC: 11 MG/DL (ref 8–23)
BUN/CREAT BLD: 14 (ref 9–20)
CALCIUM SERPL-MCNC: 9.9 MG/DL (ref 8.6–10.4)
CHLORIDE SERPL-SCNC: 106 MMOL/L (ref 98–107)
CHOLEST SERPL-MCNC: 121 MG/DL
CHOLESTEROL/HDL RATIO: 2.9
CO2 SERPL-SCNC: 32 MMOL/L (ref 20–31)
CREAT SERPL-MCNC: 0.76 MG/DL (ref 0.5–0.9)
EST. AVERAGE GLUCOSE BLD GHB EST-MCNC: 120 MG/DL
GFR SERPL CREATININE-BSD FRML MDRD: >60 ML/MIN/1.73M2
GLUCOSE SERPL-MCNC: 105 MG/DL (ref 70–99)
HBA1C MFR BLD: 5.8 % (ref 4–6)
HCT VFR BLD AUTO: 42 % (ref 36.3–47.1)
HDLC SERPL-MCNC: 42 MG/DL
HGB BLD-MCNC: 13.7 G/DL (ref 11.9–15.1)
LDLC SERPL CALC-MCNC: 64 MG/DL (ref 0–130)
MCH RBC QN AUTO: 29.3 PG (ref 25.2–33.5)
MCHC RBC AUTO-ENTMCNC: 32.6 G/DL (ref 28.4–34.8)
MCV RBC AUTO: 89.9 FL (ref 82.6–102.9)
NRBC AUTOMATED: 0 PER 100 WBC
PDW BLD-RTO: 12.5 % (ref 11.8–14.4)
PLATELET # BLD AUTO: 198 K/UL (ref 138–453)
PMV BLD AUTO: 9.7 FL (ref 8.1–13.5)
POTASSIUM SERPL-SCNC: 4.6 MMOL/L (ref 3.7–5.3)
PROT SERPL-MCNC: 7.3 G/DL (ref 6.4–8.3)
RBC # BLD: 4.67 M/UL (ref 3.95–5.11)
SODIUM SERPL-SCNC: 144 MMOL/L (ref 135–144)
TRIGL SERPL-MCNC: 76 MG/DL
WBC # BLD AUTO: 6 K/UL (ref 3.5–11.3)

## 2023-02-08 PROCEDURE — 36415 COLL VENOUS BLD VENIPUNCTURE: CPT

## 2023-02-08 PROCEDURE — 80061 LIPID PANEL: CPT

## 2023-02-08 PROCEDURE — 83036 HEMOGLOBIN GLYCOSYLATED A1C: CPT

## 2023-02-08 PROCEDURE — 80053 COMPREHEN METABOLIC PANEL: CPT

## 2023-02-08 PROCEDURE — 85027 COMPLETE CBC AUTOMATED: CPT

## 2023-02-13 DIAGNOSIS — E78.5 HYPERLIPIDEMIA, UNSPECIFIED HYPERLIPIDEMIA TYPE: ICD-10-CM

## 2023-02-13 DIAGNOSIS — R73.9 HYPERGLYCEMIA: ICD-10-CM

## 2023-02-13 DIAGNOSIS — K21.9 GASTROESOPHAGEAL REFLUX DISEASE, UNSPECIFIED WHETHER ESOPHAGITIS PRESENT: ICD-10-CM

## 2023-02-13 DIAGNOSIS — I10 ESSENTIAL HYPERTENSION: ICD-10-CM

## 2023-02-13 RX ORDER — PANTOPRAZOLE SODIUM 40 MG/1
40 TABLET, DELAYED RELEASE ORAL
Qty: 90 TABLET | Refills: 1 | Status: SHIPPED | OUTPATIENT
Start: 2023-02-13

## 2023-02-17 ENCOUNTER — OFFICE VISIT (OUTPATIENT)
Dept: PRIMARY CARE CLINIC | Age: 68
End: 2023-02-17
Payer: MEDICARE

## 2023-02-17 VITALS
RESPIRATION RATE: 18 BRPM | BODY MASS INDEX: 34.15 KG/M2 | HEART RATE: 81 BPM | WEIGHT: 200 LBS | OXYGEN SATURATION: 98 % | HEIGHT: 64 IN | TEMPERATURE: 98.1 F | DIASTOLIC BLOOD PRESSURE: 70 MMHG | SYSTOLIC BLOOD PRESSURE: 118 MMHG

## 2023-02-17 DIAGNOSIS — M25.50 POLYARTHRALGIA: ICD-10-CM

## 2023-02-17 DIAGNOSIS — M79.642 PAIN IN BOTH HANDS: Primary | ICD-10-CM

## 2023-02-17 DIAGNOSIS — M79.641 PAIN IN BOTH HANDS: Primary | ICD-10-CM

## 2023-02-17 PROCEDURE — 3078F DIAST BP <80 MM HG: CPT | Performed by: NURSE PRACTITIONER

## 2023-02-17 PROCEDURE — 3074F SYST BP LT 130 MM HG: CPT | Performed by: NURSE PRACTITIONER

## 2023-02-17 PROCEDURE — 99214 OFFICE O/P EST MOD 30 MIN: CPT | Performed by: NURSE PRACTITIONER

## 2023-02-17 PROCEDURE — 1123F ACP DISCUSS/DSCN MKR DOCD: CPT | Performed by: NURSE PRACTITIONER

## 2023-02-17 SDOH — ECONOMIC STABILITY: HOUSING INSECURITY
IN THE LAST 12 MONTHS, WAS THERE A TIME WHEN YOU DID NOT HAVE A STEADY PLACE TO SLEEP OR SLEPT IN A SHELTER (INCLUDING NOW)?: NO

## 2023-02-17 SDOH — ECONOMIC STABILITY: INCOME INSECURITY: HOW HARD IS IT FOR YOU TO PAY FOR THE VERY BASICS LIKE FOOD, HOUSING, MEDICAL CARE, AND HEATING?: NOT HARD AT ALL

## 2023-02-17 SDOH — ECONOMIC STABILITY: FOOD INSECURITY: WITHIN THE PAST 12 MONTHS, THE FOOD YOU BOUGHT JUST DIDN'T LAST AND YOU DIDN'T HAVE MONEY TO GET MORE.: NEVER TRUE

## 2023-02-17 SDOH — ECONOMIC STABILITY: FOOD INSECURITY: WITHIN THE PAST 12 MONTHS, YOU WORRIED THAT YOUR FOOD WOULD RUN OUT BEFORE YOU GOT MONEY TO BUY MORE.: NEVER TRUE

## 2023-02-17 ASSESSMENT — PATIENT HEALTH QUESTIONNAIRE - PHQ9
SUM OF ALL RESPONSES TO PHQ QUESTIONS 1-9: 0
2. FEELING DOWN, DEPRESSED OR HOPELESS: 0
SUM OF ALL RESPONSES TO PHQ QUESTIONS 1-9: 0
SUM OF ALL RESPONSES TO PHQ QUESTIONS 1-9: 0
SUM OF ALL RESPONSES TO PHQ9 QUESTIONS 1 & 2: 0
1. LITTLE INTEREST OR PLEASURE IN DOING THINGS: 0
SUM OF ALL RESPONSES TO PHQ QUESTIONS 1-9: 0

## 2023-02-17 ASSESSMENT — ENCOUNTER SYMPTOMS: SHORTNESS OF BREATH: 1

## 2023-02-17 NOTE — PATIENT INSTRUCTIONS
SURVEY:    You may be receiving a survey from [employer] regarding your visit today. Please complete the survey to enable us to provide the highest quality of care to you and your family. If you cannot score us a very good on any question, please call the office to discuss how we could of made your experience a very good one. Thank you.

## 2023-02-17 NOTE — PROGRESS NOTES
Name: Sony Schneider  : 1955         Chief Complaint:     Chief Complaint   Patient presents with    Hypertension     Current medication regimen includes lasix 40mg QD. She is not monitoring her blood pressure at home. At-home blood pressure is averaging N/A. She admits following a low-sodium diet. She denies chest pain, vision changes, lightheadedness, or dizziness. Hyperlipidemia     Current treatment includes atorvastatin 20mg QD. Admits daily medication compliance. Denies side effects. Joint Pain     Pain and swelling in joints. Shoulder, hands, hips. History of Present Illness:      Sony Schneider is a 75 y.o.  female who presents with Hypertension (Current medication regimen includes lasix 40mg QD. She is not monitoring her blood pressure at home. At-home blood pressure is averaging N/A. She admits following a low-sodium diet. She denies chest pain, vision changes, lightheadedness, or dizziness.), Hyperlipidemia (Current treatment includes atorvastatin 20mg QD. Admits daily medication compliance. Denies side effects. ), and Joint Pain (Pain and swelling in joints. Shoulder, hands, hips. )      HPI    Hypertension:  Current medication regimen includes lasix 40mg QD. She denies monitoring her blood pressure at home. At-home blood pressure is averaging N/A. She admits following a low-sodium diet. She denies vision changes, lightheadedness, or dizziness. She admits chest pressure and SOB. She was evaluated by cardiology 2023 when she was ordered an echocardiogram, lexiscan, and CAM monitor for further evaluation. EKG 2022 normal sinus rhythm, normal ECG. Echo 2022 ejection fraction 60%, aortic leaflet calcification with mild aortic stenosis, mild mitral and tricuspid regurgitation, mild diastolic dysfunction. Hyperlipidemia:  Current treatment includes atorvastatin 20mg QD. admits daily medication compliance. She denies side effects with this medication. Arthralgias:  Admits constant hand pain, worse when she goes to make a fist. Admits bilateral knee, hip, and shoulder pain. This has been ongoing for years. She was previously on meloxicam with benefit. Pain is worse with repetitive motions. She states her hand  is not at strong. She takes tylenol and advil with benefit. She has also used voltaren gel on knees with benefit. Past Medical History:     Past Medical History:   Diagnosis Date    Allergic rhinitis 1992    Aphthous ulcer of mouth     DDD (degenerative disc disease), cervical 2013    H/O echocardiogram 08/11/2015    EF 65%. Aortic valve sclerosis without stenosis. Mild aortic regurgitation. Myxomatous thickening of the mitral leaflets with Moderate mitral regurgitation. Mils (grade l) diastolic dysfunction. H/O echocardiogram 09/02/2016    EF >60%. Normal LV wall thickness and cavity size. No definite specific wall motion abnormalities were identified. Left atrium is mildly dilated (29-33) left atrial volume index of 31 ml/m2. Mild aortic stenosis. Myxomatous thickening of the mitral leaflets with moderate eccentric mitral regurg. Mild treicuspid regurg. Mild diastolic dysfunction is seen. H/O echocardiogram 08/06/2018    EF >55%. The LV wall thickness is mildly incrased. Moderate mitral regurg. Mild aortic stenosis. Mild aortic regurg. Mild tricuspid regurg. Evidence of mild (grade I) diastolic dysfucntion is seen.      Hyperglycemia 2013    Hyperlipidemia     Hypertension     Mitral regurgitation 2010    MVP (mitral valve prolapse) 2010    Obesity 2010    Pulmonary hypertension ([address] 2013    Sleep apnea 2012    Uterine fibroid 2008    Venous insufficiency 2013    Wears glasses       Reviewed all health maintenance requirements and ordered appropriate tests  Health Maintenance Due   Topic Date Due    Shingles vaccine (1 of 2) Never done    COVID-19 Vaccine (4 - Booster for [employer]roec[employer] series) 01/07/2022       Past Surgical History:     Past Surgical History:   Procedure Laterality Date    ABDOMEN SURGERY  10/12/2018    pelvic mass removed     COLONOSCOPY  08/13/2018    -polyp(benign lymphoid)hemorrhoids    COLONOSCOPY N/A 8/13/2018    COLONOSCOPY POLYPECTOMY SNARE/COLD BIOPSY [facility]  2008    Dr. Cid Began ([address])  2009    HYSTERECTOMY (CERVIX STATUS UNKNOWN)  2009    supracervical, BSO with cervical conization, Dr. Moncho Latham Left 12/4/2018    ESWL [address] LITHOTRIPSY performed by Alycia Lanes, MD at [address]        Medications:       Prior to Admission medications    Medication Sig Start Date End Date Taking? Authorizing Provider   pantoprazole (PROTONIX) 40 MG tablet Take 1 tablet by mouth every morning (before breakfast) 2/13/23  Yes AIDEE Osuna CNP   Cholecalciferol (VITAMIN D) 50 MCG (2000 UT) CAPS capsule Take by mouth   Yes Historical Provider, MD   furosemide (LASIX) 40 MG tablet TAKE 1 TABLET BY MOUTH EVERY DAY 11/8/22  Yes AIDEE Osuna CNP   atorvastatin (LIPITOR) 20 MG tablet TAKE 1 TABLET BY MOUTH EVERY DAY 9/2/22  Yes AIDEE Osuna CNP   diclofenac sodium (VOLTAREN) 1 % GEL APPLY 2G TOPICALLY 2 TIMES DAILY 12/7/21  Yes Kelsey Carreon MD   aspirin 81 MG tablet Take 81 mg by mouth daily   Yes Historical Provider, MD   calcium carbonate (OSCAL) 500 MG TABS tablet Take 500 mg by mouth daily   Yes Historical Provider, MD   Multiple Vitamins-Minerals (ALIVE ONCE DAILY WOMENS 50+ PO) Take by mouth   Yes Historical Provider, MD   Ascorbic Acid (VITAMIN C) 250 MG tablet Take 250 mg by mouth daily   Yes Historical Provider, MD        Allergies:       Seasonal    Social History:     Tobacco:    reports that she has never smoked.  She has never used smokeless tobacco.  Alcohol:      reports no history of alcohol use. Drug Use:  reports no history of drug use. Family History:     Family History   Problem Relation Age of Onset    High Blood Pressure Father     Heart Disease Father     Stroke Father     Dementia Mother     Other Mother         thoracic AAA    Cancer Mother         bilateral kidney       Review of Systems:     Positive and Negative as described in HPI    Review of Systems   Respiratory:  Positive for shortness of breath. Cardiovascular:  Positive for chest pain and palpitations. Musculoskeletal:  Positive for joint swelling. Neurological:  Negative for dizziness and light-headedness. Physical Exam:   Vitals:  /70   Pulse 81   Temp 98.1 °F (36.7 °C)   Resp 18   Ht 5' 4\" (1.626 m)   Wt 200 lb (90.7 kg)   LMP  (LMP Unknown)   SpO2 98%   BMI 34.33 kg/m²     Physical Exam  Constitutional:       General: She is not in acute distress. Appearance: Normal appearance. She is normal weight. She is not ill-appearing or toxic-appearing. Cardiovascular:      Rate and Rhythm: Normal rate and regular rhythm. Heart sounds: Murmur (left sternal border) heard. Pulmonary:      Effort: No respiratory distress. Breath sounds: No stridor. No wheezing, rhonchi or rales. Musculoskeletal:      Comments: No erythema, warmth, edema, or overlying skin changes to bilateral shoulders, hands, or knees. Bilateral hand  strength 5/5. Bilateral finger taps WNL. Full ROM with flexion, extension, and abduction bilateral shoulders. 5/5 upper and lower extremity strength. Full flexion and extension bilateral knees. Neurological:      Mental Status: She is alert. Psychiatric:         Mood and Affect: Mood normal.         Behavior: Behavior normal.         Thought Content:  Thought content normal.         Judgment: Judgment normal.       Data:     Lab Results   Component Value Date/Time     02/08/2023 08:43 AM    K 4.6 02/08/2023 08:43 AM     02/08/2023 08:43 AM    CO2 32 02/08/2023 08:43 AM    BUN 11 02/08/2023 08:43 AM    CREATININE 0.76 02/08/2023 08:43 AM    GLUCOSE 105 02/08/2023 08:43 AM    GLUCOSE 98 05/08/2012 02:33 PM    PROT 7.3 02/08/2023 08:43 AM    LABALBU 3.8 02/08/2023 08:43 AM    LABALBU 4.2 05/08/2012 02:33 PM    BILITOT 0.5 02/08/2023 08:43 AM    ALKPHOS 112 02/08/2023 08:43 AM    AST 21 02/08/2023 08:43 AM    ALT 28 02/08/2023 08:43 AM     Lab Results   Component Value Date/Time    WBC 6.0 02/08/2023 08:43 AM    RBC 4.67 02/08/2023 08:43 AM    RBC 4.52 05/08/2012 02:33 PM    HGB 13.7 02/08/2023 08:43 AM    HCT 42.0 02/08/2023 08:43 AM    MCV 89.9 02/08/2023 08:43 AM    MCH 29.3 02/08/2023 08:43 AM    MCHC 32.6 02/08/2023 08:43 AM    RDW 12.5 02/08/2023 08:43 AM     02/08/2023 08:43 AM     05/08/2012 02:33 PM    MPV 9.7 02/08/2023 08:43 AM     Lab Results   Component Value Date/Time    TSH 2.20 07/15/2015 09:11 AM     Lab Results   Component Value Date/Time    CHOL 121 02/08/2023 08:42 AM    HDL 42 02/08/2023 08:42 AM    LABA1C 5.8 02/08/2023 08:43 AM       Assessment/Plan:      Diagnosis Orders   1. Pain in both hands  C-Reactive Protein    Sedimentation Rate    Rheumatoid Factor    CRISTO Screen With Reflex    XR HAND RIGHT (MIN 3 VIEWS)    XR HAND LEFT (MIN 3 VIEWS)      2. Polyarthralgia  C-Reactive Protein    Sedimentation Rate    Rheumatoid Factor    CRISTO Screen With Reflex    XR HAND RIGHT (MIN 3 VIEWS)    XR HAND LEFT (MIN 3 VIEWS)        Hypertension:   - Stable  - Continue Lasix 40 mg daily  - Counseled on healthy diet, routine exercise, low-salt diet, limiting caffeine    Chest pain, shortness of breath:  - Established with cardiology Nadeen Reynolds  - Scheduled for echocardiogram, CAM monitor, and Lexiscan next week  - Scheduled with cardiology for follow-up 3/22/2023    Bilateral hand pain:  - Reinitiate Mobic 15 mg daily. Patient confirms she has this Rx at home.   - Bilateral hand x-ray ordered  - Further evaluation with CRISTO, rheumatoid factor, inflammatory markers. 2/2023 CBC WNL    Polyarthralgia:  - Reinitiate Mobic 15 mg daily.  - Further evaluation with CRISTO, rheumatoid factor, CRP, sed rate. 2/2023 CBC WNL  - Offered to pool therapy, patient declines due to fear of water    Hyperlipidemia:   -- Reviewed lipid panel 2/2023, WNL   -- Continue atorvastatin 20mg QD     Prediabetes:   -- Reviewed A1c 5.8% 2/2023, improving when compared to 10/2022   -- Counseled on diabetic diet and routine exercise   -- Complete POC A1c in office 3 months     Completed Refills   Requested Prescriptions      No prescriptions requested or ordered in this encounter       Orders Placed This Encounter   Procedures    XR HAND RIGHT (MIN 3 VIEWS)     Standing Status:   Future     Standing Expiration Date:   2/18/2024    XR HAND LEFT (MIN 3 VIEWS)     Standing Status:   Future     Standing Expiration Date:   2/18/2024    C-Reactive Protein     Standing Status:   Future     Standing Expiration Date:   2/17/2024    Sedimentation Rate     Standing Status:   Future     Standing Expiration Date:   2/17/2024    Rheumatoid Factor     Standing Status:   Future     Standing Expiration Date:   2/17/2024    CRISTO Screen With Reflex     Standing Status:   Future     Standing Expiration Date:   2/17/2024          No results found for this visit on 02/17/23. Return in about 3 months (around 5/17/2023), or if symptoms worsen or fail to improve, for 3 month f/u .     Electronically signed by AIDEE Ely CNP on 02/17/23 at 9:15 AM.

## 2023-02-20 ENCOUNTER — HOSPITAL ENCOUNTER (OUTPATIENT)
Dept: GENERAL RADIOLOGY | Age: 68
Discharge: HOME | End: 2023-02-22
Payer: MEDICARE

## 2023-02-20 ENCOUNTER — HOSPITAL ENCOUNTER (OUTPATIENT)
Age: 68
Discharge: HOME | End: 2023-02-22
Payer: MEDICARE

## 2023-02-20 ENCOUNTER — HOSPITAL ENCOUNTER (OUTPATIENT)
Age: 68
Discharge: HOME | End: 2023-02-20
Payer: MEDICARE

## 2023-02-20 DIAGNOSIS — M79.642 PAIN IN BOTH HANDS: ICD-10-CM

## 2023-02-20 DIAGNOSIS — M79.641 PAIN IN BOTH HANDS: ICD-10-CM

## 2023-02-20 DIAGNOSIS — M25.50 POLYARTHRALGIA: ICD-10-CM

## 2023-02-20 DIAGNOSIS — N20.0 KIDNEY STONES: ICD-10-CM

## 2023-02-20 LAB
CRP SERPL HS-MCNC: 3.1 MG/L (ref 0–5)
ERYTHROCYTE [SEDIMENTATION RATE] IN BLOOD BY WESTERGREN METHOD: 6 MM/HR (ref 0–30)
RHEUMATOID FACTOR: <10 IU/ML

## 2023-02-20 PROCEDURE — 86140 C-REACTIVE PROTEIN: CPT

## 2023-02-20 PROCEDURE — 86038 ANTINUCLEAR ANTIBODIES: CPT

## 2023-02-20 PROCEDURE — 74018 RADEX ABDOMEN 1 VIEW: CPT

## 2023-02-20 PROCEDURE — 73130 X-RAY EXAM OF HAND: CPT

## 2023-02-20 PROCEDURE — 85652 RBC SED RATE AUTOMATED: CPT

## 2023-02-20 PROCEDURE — 86225 DNA ANTIBODY NATIVE: CPT

## 2023-02-20 PROCEDURE — 36415 COLL VENOUS BLD VENIPUNCTURE: CPT

## 2023-02-20 PROCEDURE — 86431 RHEUMATOID FACTOR QUANT: CPT

## 2023-02-21 LAB
ANA SER QL IA: NEGATIVE
DSDNA IGG SER QL IA: 0.7 IU/ML
NUCLEAR IGG SER IA-RTO: 0.4 U/ML

## 2023-02-22 ENCOUNTER — HOSPITAL ENCOUNTER (OUTPATIENT)
Dept: NON INVASIVE DIAGNOSTICS | Age: 68
Discharge: HOME | End: 2023-02-22
Payer: MEDICARE

## 2023-02-22 DIAGNOSIS — I35.0 MILD AORTIC STENOSIS: ICD-10-CM

## 2023-02-22 DIAGNOSIS — E66.9 CLASS 1 OBESITY WITHOUT SERIOUS COMORBIDITY WITH BODY MASS INDEX (BMI) OF 34.0 TO 34.9 IN ADULT, UNSPECIFIED OBESITY TYPE: ICD-10-CM

## 2023-02-22 DIAGNOSIS — R06.02 SOB (SHORTNESS OF BREATH): ICD-10-CM

## 2023-02-22 DIAGNOSIS — I10 ESSENTIAL HYPERTENSION: ICD-10-CM

## 2023-02-22 DIAGNOSIS — R00.2 PALPITATION: ICD-10-CM

## 2023-02-22 DIAGNOSIS — R94.31 ABNORMAL EKG: ICD-10-CM

## 2023-02-22 LAB
LV EF: 60 %
LVEF MODALITY: NORMAL

## 2023-02-22 PROCEDURE — 93017 CV STRESS TEST TRACING ONLY: CPT

## 2023-02-22 PROCEDURE — A9500 TC99M SESTAMIBI: HCPCS | Performed by: FAMILY MEDICINE

## 2023-02-22 PROCEDURE — 3430000000 HC RX DIAGNOSTIC RADIOPHARMACEUTICAL: Performed by: FAMILY MEDICINE

## 2023-02-22 PROCEDURE — 93306 TTE W/DOPPLER COMPLETE: CPT

## 2023-02-22 RX ORDER — TECHNETIUM TC-99M SESTAMIBI 1 MG/10ML
30 INJECTION INTRAVENOUS
Status: COMPLETED | OUTPATIENT
Start: 2023-02-22 | End: 2023-02-22

## 2023-02-22 RX ADMIN — Medication 30 MILLICURIE: at 08:49

## 2023-02-23 ENCOUNTER — HOSPITAL ENCOUNTER (OUTPATIENT)
Dept: NON INVASIVE DIAGNOSTICS | Age: 68
Discharge: HOME | End: 2023-02-23
Payer: MEDICARE

## 2023-02-23 PROCEDURE — 78452 HT MUSCLE IMAGE SPECT MULT: CPT

## 2023-02-23 PROCEDURE — 93243 EXT ECG>48HR<7D SCAN A/R: CPT

## 2023-02-23 PROCEDURE — 3430000000 HC RX DIAGNOSTIC RADIOPHARMACEUTICAL: Performed by: FAMILY MEDICINE

## 2023-02-23 PROCEDURE — A9500 TC99M SESTAMIBI: HCPCS | Performed by: FAMILY MEDICINE

## 2023-02-23 PROCEDURE — 93242 EXT ECG>48HR<7D RECORDING: CPT

## 2023-02-23 RX ORDER — TECHNETIUM TC-99M SESTAMIBI 1 MG/10ML
30 INJECTION INTRAVENOUS
Status: COMPLETED | OUTPATIENT
Start: 2023-02-23 | End: 2023-02-23

## 2023-02-23 RX ADMIN — Medication 30 MILLICURIE: at 14:25

## 2023-02-24 ENCOUNTER — TELEPHONE (OUTPATIENT)
Dept: CARDIOLOGY | Age: 68
End: 2023-02-24

## 2023-02-24 NOTE — TELEPHONE ENCOUNTER
----- Message from Jennifer Dickerson MD sent at 2/24/2023 11:42 AM EST -----  Let Ms. Bhakti Ny know their test result was ok. Will discuss at next visit. Thanks.

## 2023-02-24 NOTE — PROCEDURES
[address], [place] [address]                              CARDIAC STRESS TEST    PATIENT NAME: Milagros Louise                   :        1955  MED REC NO:   [id number]                              ROOM:  ACCOUNT NO:   [de-identified]                           ADMIT DATE: 2023  PROVIDER:     Malcolm Floyd MD    CARDIOVASCULAR DIAGNOSTIC DEPARTMENT    DATE OF STUDY:  2023    ORDERING PROVIDER:  Jarrod Osei MD    PRIMARY CARE PROVIDER:  AIDEE Asher-CNP    INTERPRETING PHYSICIAN:  Malcolm Floyd MD    EXERCISE MYOCARDIAL PERFUSION STRESS TEST REPORT    Stress/rest single-isotope SPECT imaging with exercise stress and gated  SPECT imaging. INDICATION:  Assessment of recent chest pain and/or discomfort. Assessment of a cardiac cause of dyspnea. CLINICAL HISTORY:  The patient is a 68-year-old woman with no known  coronary artery disease. Previous cardiac history includes stress test.    Other previous history includes chest pain, dyspnea, arthritis, family  history of coronary artery disease in father under age 61. Symptoms just prior to testing:  None. Relevant medications:  None. PROCEDURE:  The patient performed treadmill exercise using a Jacob  protocol, completing 4:12 minutes and completing an estimated workload  of 6 metabolic equivalents (METS). The test was terminated due to fatigue and shortness of breath. The heart rate was 86 beats per minute at baseline and increased to 150  beats per minute at peak exercise, which was 98% of the maximum  predicted heart rate. The rest blood pressure was 160/76 mmHg and  increased to 218/86 mmHg, which is a normal response. During the  procedure, the patient developed fatigue, shortness of breath and leg  fatigue, but denied any chest discomfort.     Myocardial perfusion imaging: Imaging was performed at rest 30-45  minutes following the injection of 30 mCi of sestamibi. At peak  exercise, the patient was injected with 30 mCi of sestamibi and exercise  was continued for 1 minute. Gating post-stress tomographic imaging was  performed 30-45 minutes after stress. STRESS ECG RESULTS:  The resting electrocardiogram demonstrated normal  sinus rhythm without definitive ST-segment abnormalities suggestive of  myocardial ischemia. At peak exercise and during recovery, the patient developed:    Upsloping ST segment changes in lead(s) II, III, aVF, V3, V4, V5 and V6,  which did not meet diagnostic criteria for myocardial ischemia with no  premature atrial contractions (PACs) and occasional premature  ventricular contractions (PVCs). NUCLEAR IMAGING RESULTS:  The overall quality of the study is fair. Mild/moderate attenuation artifact was seen. There is no evidence of  abnormal lung uptake. Additionally, the right ventricle appears normal.  The left ventricular cavity is noted to be normal in size on the stress  images. There is no evidence of transient ischemic dilatation (TID) of  the left ventricle. Gated SPECT imaging reveals normal myocardial thickening and wall motion  with a calculated left ventricular ejection fraction of 77%. The rest images demonstrated a small perfusion abnormality of mild  intensity in the apical and anteroapical region(s), which is most likely  due to artifact. On stress imaging, a small/moderate perfusion abnormality of mild  intensity was noted in the apical and anteroapical region(s), which may  be due to artifact. IMPRESSION:  1. Abnormal myocardial perfusion study. There is a small/moderate  perfusion defect of mild intensity in the apical and anteroapical  regions during stress imaging, which is most consistent with ischemia,  but may be due to artifact. 2.  Global left ventricular systolic function was normal with an EF of  77% without regional wall motion abnormalities.     3.  No significant electrocardiographic evidence of myocardial ischemia  during EKG monitoring without significant associated arrhythmias. Overall, these results are most consistent with a low/intermediate risk  for significant coronary artery disease. Summed difference score is 4. Depending on the patient symptoms and level of clinical suspicion,  aggressive medical management vs. additional testing by coronary  angiography may be indicated.         Marti Baron MD    D: 02/24/2023 10:34:24       T: 02/24/2023 10:35:53     [email or web address]  Job#: [id number]     Doc#: Unknown    CC:  AIDEE Alves-MARISA Jacome MD

## 2023-02-26 NOTE — RESULT ENCOUNTER NOTE
Please let Ms. Hattie Chew know that her test was abnormal and please make them an appointment in 2-3 WEEKS if not already done. Thanks.     Ronny

## 2023-02-27 ENCOUNTER — TELEPHONE (OUTPATIENT)
Dept: CARDIOLOGY | Age: 68
End: 2023-02-27

## 2023-02-27 ENCOUNTER — OFFICE VISIT (OUTPATIENT)
Dept: UROLOGY | Age: 68
End: 2023-02-27
Payer: MEDICARE

## 2023-02-27 ENCOUNTER — HOSPITAL ENCOUNTER (OUTPATIENT)
Age: 68
Setting detail: SPECIMEN
Discharge: HOME | End: 2023-02-27
Payer: MEDICARE

## 2023-02-27 VITALS
HEIGHT: 64 IN | HEART RATE: 80 BPM | BODY MASS INDEX: 34.31 KG/M2 | DIASTOLIC BLOOD PRESSURE: 77 MMHG | TEMPERATURE: 97.3 F | SYSTOLIC BLOOD PRESSURE: 146 MMHG | WEIGHT: 201 LBS

## 2023-02-27 DIAGNOSIS — R31.0 GROSS HEMATURIA: ICD-10-CM

## 2023-02-27 DIAGNOSIS — N20.0 KIDNEY STONES: Primary | ICD-10-CM

## 2023-02-27 LAB
BACTERIA: ABNORMAL
BILIRUBIN URINE: NEGATIVE
CASTS UA: ABNORMAL /LPF
COLOR: YELLOW
EPITHELIAL CELLS UA: ABNORMAL /HPF (ref 0–25)
GLUCOSE UR STRIP.AUTO-MCNC: NEGATIVE MG/DL
KETONES UR STRIP.AUTO-MCNC: NEGATIVE MG/DL
LEUKOCYTE ESTERASE UR QL STRIP.AUTO: ABNORMAL
NITRITE UR QL STRIP.AUTO: NEGATIVE
PROT UR STRIP.AUTO-MCNC: 7 MG/DL (ref 5–9)
PROT UR STRIP.AUTO-MCNC: ABNORMAL MG/DL
RBC CLUMPS #/AREA URNS AUTO: ABNORMAL /HPF (ref 0–2)
RENAL EPITHELIAL, UA: ABNORMAL /HPF
SPECIFIC GRAVITY UA: 1.02 (ref 1.01–1.02)
TURBIDITY: ABNORMAL
URINE HGB: ABNORMAL
UROBILINOGEN, URINE: NORMAL
WBC UA: ABNORMAL /HPF (ref 0–5)

## 2023-02-27 PROCEDURE — 99214 OFFICE O/P EST MOD 30 MIN: CPT | Performed by: NURSE PRACTITIONER

## 2023-02-27 PROCEDURE — 3077F SYST BP >= 140 MM HG: CPT | Performed by: NURSE PRACTITIONER

## 2023-02-27 PROCEDURE — 1123F ACP DISCUSS/DSCN MKR DOCD: CPT | Performed by: NURSE PRACTITIONER

## 2023-02-27 PROCEDURE — 3078F DIAST BP <80 MM HG: CPT | Performed by: NURSE PRACTITIONER

## 2023-02-27 PROCEDURE — 81001 URINALYSIS AUTO W/SCOPE: CPT

## 2023-02-27 ASSESSMENT — ENCOUNTER SYMPTOMS
APNEA: 0
COUGH: 0
CONSTIPATION: 0
COLOR CHANGE: 0
VOMITING: 0
NAUSEA: 0
ABDOMINAL PAIN: 0
EYE REDNESS: 0
BACK PAIN: 0
WHEEZING: 0
SHORTNESS OF BREATH: 0

## 2023-02-27 NOTE — PATIENT INSTRUCTIONS
SURVEY:    You may be receiving a survey from [employer] ([employer]) regarding your visit today. Please complete the survey to enable us to provide the highest quality of care to you and your family. If you cannot score us a very good on any question, please call the office to discuss how we could have made your experience a very good one. Thank you.

## 2023-02-27 NOTE — PROGRESS NOTES
HPI:        Patient is a 75 y.o. female in no acute distress. She is alert and oriented to person, place, and time. History  8/6/2018 Referral from Dr. Vilma Tovar for gross hematuria. She first noticed the hematuria in the spring and has had multiple episodes since. This has not been associated with lower urinary tract symptoms, but she does experience suprapubic \"cramping\". She was never a smoker. She does work for [employer]. She denies history of kidney stones or frequent urinary tract infection. She did have a hysterectomy in 2009, but does have her ovaries. 8/2018 CT showed punctate nonobstructing stones in the right, and a 5 mm nonobstructing stone in the left kidney but there is no hydronephrosis. There is no no masses or filling defects in the upper collecting system, ureters, or bladder to suggest malignancy. The radiologist as mentioned a multicystic residual right ovary and a mass lesion of the vaginal cuff. She did see gynecology for this and they are completing a work-up for her GYN abnormalities. Cystoscopy was negative. 10/2018 excision of pelvic mass     12/2018 left ESWL    Today  Here today to follow-up for history of stones and hematuria. She denies any dysuria or gross hematuria. She denies any flank or abdominal pain. She did have a KUB completed prior to today's visit. This film was independently reviewed and shows nonobstructing stones in the right kidney. There are multiple small stones and a larger stone noted. The largest stone does appear to be larger than it was last year. Past Medical History:   Diagnosis Date    Allergic rhinitis 1992    Aphthous ulcer of mouth     DDD (degenerative disc disease), cervical 2013    H/O echocardiogram 08/11/2015    EF 65%. Aortic valve sclerosis without stenosis. Mild aortic regurgitation. Myxomatous thickening of the mitral leaflets with Moderate mitral regurgitation. Mils (grade l) diastolic dysfunction. H/O echocardiogram 09/02/2016    EF >60%. Normal LV wall thickness and cavity size. No definite specific wall motion abnormalities were identified. Left atrium is mildly dilated (29-33) left atrial volume index of 31 ml/m2. Mild aortic stenosis. Myxomatous thickening of the mitral leaflets with moderate eccentric mitral regurg. Mild treicuspid regurg. Mild diastolic dysfunction is seen. H/O echocardiogram 08/06/2018    EF >55%. The LV wall thickness is mildly incrased. Moderate mitral regurg. Mild aortic stenosis. Mild aortic regurg. Mild tricuspid regurg. Evidence of mild (grade I) diastolic dysfucntion is seen.      Hyperglycemia 2013    Hyperlipidemia     Hypertension     Mitral regurgitation 2010    MVP (mitral valve prolapse) 2010    Obesity 2010    Pulmonary hypertension ([address] 2013    Sleep apnea 2012    Uterine fibroid 2008    Venous insufficiency 2013    Wears glasses      Past Surgical History:   Procedure Laterality Date    ABDOMEN SURGERY  10/12/2018    pelvic mass removed     COLONOSCOPY  08/13/2018    -polyp(benign lymphoid)hemorrhoids    COLONOSCOPY N/A 8/13/2018    COLONOSCOPY POLYPECTOMY SNARE/COLD BIOPSY performed by Jaskaran Reyes MD at [facility]  2008    Dr. Sully Owens ([address] [facility])  2009    HYSTERECTOMY (CERVIX STATUS UNKNOWN)  2009    supracervical, BSO with cervical conization, Dr. Taylor Cross Left 12/4/2018    ESWL [address] LITHOTRIPSY performed by Maria Luz Brownlee MD at [facility] Encounter Medications as of 2/27/2023   Medication Sig Dispense Refill    ZINC PO Take by mouth daily      pantoprazole (PROTONIX) 40 MG tablet Take 1 tablet by mouth every morning (before breakfast) 90 tablet 1    Cholecalciferol (VITAMIN D) 50 MCG (2000 UT) CAPS capsule Take by mouth      furosemide (LASIX) 40 MG tablet TAKE 1 TABLET BY MOUTH EVERY DAY 90 tablet 3    atorvastatin (LIPITOR) 20 MG tablet TAKE 1 TABLET BY MOUTH EVERY DAY 90 tablet 3    diclofenac sodium (VOLTAREN) 1 % GEL APPLY 2G TOPICALLY 2 TIMES DAILY 100 g 3    aspirin 81 MG tablet Take 81 mg by mouth daily      calcium carbonate (OSCAL) 500 MG TABS tablet Take 500 mg by mouth daily      Multiple Vitamins-Minerals (ALIVE ONCE DAILY WOMENS 50+ PO) Take by mouth      Ascorbic Acid (VITAMIN C) 250 MG tablet Take 250 mg by mouth daily       No facility-administered encounter medications on file as of 2/27/2023. Current Outpatient Medications on File Prior to Visit   Medication Sig Dispense Refill    ZINC PO Take by mouth daily      pantoprazole (PROTONIX) 40 MG tablet Take 1 tablet by mouth every morning (before breakfast) 90 tablet 1    Cholecalciferol (VITAMIN D) 50 MCG (2000 UT) CAPS capsule Take by mouth      furosemide (LASIX) 40 MG tablet TAKE 1 TABLET BY MOUTH EVERY DAY 90 tablet 3    atorvastatin (LIPITOR) 20 MG tablet TAKE 1 TABLET BY MOUTH EVERY DAY 90 tablet 3    diclofenac sodium (VOLTAREN) 1 % GEL APPLY 2G TOPICALLY 2 TIMES DAILY 100 g 3    aspirin 81 MG tablet Take 81 mg by mouth daily      calcium carbonate (OSCAL) 500 MG TABS tablet Take 500 mg by mouth daily      Multiple Vitamins-Minerals (ALIVE ONCE DAILY WOMENS 50+ PO) Take by mouth      Ascorbic Acid (VITAMIN C) 250 MG tablet Take 250 mg by mouth daily       No current facility-administered medications on file prior to visit.      Seasonal  Family History   Problem Relation Age of Onset    High Blood Pressure Father     Heart Disease Father     Stroke Father     Dementia Mother     Other Mother         thoracic AAA    Cancer Mother         bilateral kidney     Social History     Tobacco Use   Smoking Status Former    Types: Cigarettes   Smokeless Tobacco Never       Social History     Substance and Sexual Activity   Alcohol Use No       Review of Systems   Constitutional:  Negative for appetite change, chills and fever. Eyes:  Negative for redness and visual disturbance. Respiratory:  Negative for apnea, cough, shortness of breath and wheezing. Cardiovascular:  Negative for chest pain and leg swelling. Gastrointestinal:  Negative for abdominal pain, constipation, nausea and vomiting. Genitourinary:  Negative for difficulty urinating, dyspareunia, dysuria, enuresis, flank pain, frequency, hematuria, pelvic pain, urgency, vaginal bleeding and vaginal discharge. Musculoskeletal:  Negative for back pain, joint swelling and myalgias. Skin:  Negative for color change, rash and wound. Neurological:  Negative for dizziness, tremors and numbness. Hematological:  Negative for adenopathy. Does not bruise/bleed easily. Psychiatric/Behavioral:  Negative for sleep disturbance. BP (!) 146/77 (Site: Left Upper Arm, Position: Sitting, Cuff Size: Large Adult)   Pulse 80   Temp 97.3 °F (36.3 °C)   Ht 5' 4\" (1.626 m)   Wt 201 lb (91.2 kg)   LMP  (LMP Unknown)   BMI 34.50 kg/m²       PHYSICAL EXAM:  Constitutional: Patient resting comfortably, in no acute distress. Neuro: Alert and oriented to person place and time. Cranial nerves grossly intact. Psych: Mood and affect normal.  Skin: Warm, dry  HEENT: normocephalic, atraumatic  Lymphatics: No palpable lymphadenopathy  Lungs: Respiratory effort normal, unlabored  Cardiovascular:  Normal peripheral pulses  Abdomen: Soft, non-tender, non-distended with no organomegaly or palpable masses. : No CVA tenderness. Bladder non-tender and not distended. Lab Results   Component Value Date    BUN 11 02/08/2023     Lab Results   Component Value Date    CREATININE 0.76 02/08/2023       ASSESSMENT:   Diagnosis Orders   1. Kidney stones        2.  Gross hematuria  Urinalysis with Microscopic              PLAN:  Surveillance UA    Discussed risks and benefits of ESWL and stent placement (including bleeding, infection, injury to  tract, renal hematoma, and need for multiple procedures such as stent placement, subsequent HLL or repeat ESWL), details of procedure, and what to expect post-op. Patient does understand that this procedure will fragment the stone, these fragments will need to pass. Fragment passage will be associated with gross hematuria and flank pain. Patient amenable to schedule RIGHT ESWL.

## 2023-02-27 NOTE — TELEPHONE ENCOUNTER
----- Message from Jessika Suggs MD sent at 2/25/2023  9:55 PM EST -----  Please let Ms. Judith Hurst know that her test was abnormal and please make them an appointment in 2-3 WEEKS if not already done. Thanks.     Ronny

## 2023-02-28 ENCOUNTER — TELEPHONE (OUTPATIENT)
Dept: UROLOGY | Age: 68
End: 2023-02-28

## 2023-02-28 ENCOUNTER — OFFICE VISIT (OUTPATIENT)
Dept: CARDIOLOGY | Age: 68
End: 2023-02-28
Payer: MEDICARE

## 2023-02-28 ENCOUNTER — HOSPITAL ENCOUNTER (OUTPATIENT)
Age: 68
Discharge: HOME | End: 2023-02-28
Payer: MEDICARE

## 2023-02-28 ENCOUNTER — TELEPHONE (OUTPATIENT)
Dept: CARDIOLOGY | Age: 68
End: 2023-02-28

## 2023-02-28 VITALS
DIASTOLIC BLOOD PRESSURE: 75 MMHG | SYSTOLIC BLOOD PRESSURE: 137 MMHG | BODY MASS INDEX: 34.31 KG/M2 | RESPIRATION RATE: 18 BRPM | HEIGHT: 64 IN | WEIGHT: 201 LBS | OXYGEN SATURATION: 99 % | HEART RATE: 65 BPM

## 2023-02-28 DIAGNOSIS — I34.0 MODERATE MITRAL REGURGITATION: ICD-10-CM

## 2023-02-28 DIAGNOSIS — R94.39 ABNORMAL STRESS TEST: ICD-10-CM

## 2023-02-28 DIAGNOSIS — R06.02 SOB (SHORTNESS OF BREATH): ICD-10-CM

## 2023-02-28 DIAGNOSIS — E66.9 CLASS 1 OBESITY WITHOUT SERIOUS COMORBIDITY WITH BODY MASS INDEX (BMI) OF 34.0 TO 34.9 IN ADULT, UNSPECIFIED OBESITY TYPE: ICD-10-CM

## 2023-02-28 DIAGNOSIS — R00.2 PALPITATION: ICD-10-CM

## 2023-02-28 DIAGNOSIS — Z01.818 PREOPERATIVE CLEARANCE: ICD-10-CM

## 2023-02-28 DIAGNOSIS — Z01.818 PREOPERATIVE CLEARANCE: Primary | ICD-10-CM

## 2023-02-28 DIAGNOSIS — I10 ESSENTIAL HYPERTENSION: ICD-10-CM

## 2023-02-28 LAB
ANION GAP SERPL CALCULATED.3IONS-SCNC: 11 MMOL/L (ref 9–17)
BUN SERPL-MCNC: 13 MG/DL (ref 8–23)
BUN/CREAT BLD: 17 (ref 9–20)
CALCIUM SERPL-MCNC: 9.7 MG/DL (ref 8.6–10.4)
CHLORIDE SERPL-SCNC: 108 MMOL/L (ref 98–107)
CO2 SERPL-SCNC: 30 MMOL/L (ref 20–31)
CREAT SERPL-MCNC: 0.78 MG/DL (ref 0.5–0.9)
GFR SERPL CREATININE-BSD FRML MDRD: >60 ML/MIN/1.73M2
GLUCOSE SERPL-MCNC: 112 MG/DL (ref 70–99)
HCT VFR BLD AUTO: 43.9 % (ref 36.3–47.1)
HGB BLD-MCNC: 14.2 G/DL (ref 11.9–15.1)
MCH RBC QN AUTO: 29.8 PG (ref 25.2–33.5)
MCHC RBC AUTO-ENTMCNC: 32.3 G/DL (ref 28.4–34.8)
MCV RBC AUTO: 92.2 FL (ref 82.6–102.9)
NRBC AUTOMATED: 0 PER 100 WBC
PDW BLD-RTO: 12.6 % (ref 11.8–14.4)
PLATELET # BLD AUTO: 197 K/UL (ref 138–453)
PMV BLD AUTO: 10.3 FL (ref 8.1–13.5)
POTASSIUM SERPL-SCNC: 5.3 MMOL/L (ref 3.7–5.3)
RBC # BLD: 4.76 M/UL (ref 3.95–5.11)
SODIUM SERPL-SCNC: 149 MMOL/L (ref 135–144)
WBC # BLD AUTO: 7.7 K/UL (ref 3.5–11.3)

## 2023-02-28 PROCEDURE — 36415 COLL VENOUS BLD VENIPUNCTURE: CPT

## 2023-02-28 PROCEDURE — 99214 OFFICE O/P EST MOD 30 MIN: CPT | Performed by: PHYSICIAN ASSISTANT

## 2023-02-28 PROCEDURE — 85027 COMPLETE CBC AUTOMATED: CPT

## 2023-02-28 PROCEDURE — 3075F SYST BP GE 130 - 139MM HG: CPT | Performed by: PHYSICIAN ASSISTANT

## 2023-02-28 PROCEDURE — 3078F DIAST BP <80 MM HG: CPT | Performed by: PHYSICIAN ASSISTANT

## 2023-02-28 PROCEDURE — 80048 BASIC METABOLIC PNL TOTAL CA: CPT

## 2023-02-28 PROCEDURE — 1123F ACP DISCUSS/DSCN MKR DOCD: CPT | Performed by: PHYSICIAN ASSISTANT

## 2023-02-28 PROCEDURE — 93000 ELECTROCARDIOGRAM COMPLETE: CPT | Performed by: FAMILY MEDICINE

## 2023-02-28 RX ORDER — METOPROLOL SUCCINATE 25 MG/1
12.5 TABLET, EXTENDED RELEASE ORAL DAILY
Qty: 30 TABLET | Refills: 3 | Status: SHIPPED | OUTPATIENT
Start: 2023-02-28

## 2023-02-28 NOTE — PATIENT INSTRUCTIONS
SURVEY:    You may be receiving a survey from [employer] regarding your visit today. Please complete the survey to enable us to provide the highest quality of care to you and your family. If you cannot score us a very good on any question, please call the office to discuss how we could have made your experience a very good one. Thank you.

## 2023-02-28 NOTE — TELEPHONE ENCOUNTER
----- Message from Shi Bridges PA-C sent at 2/28/2023  4:03 PM EST -----  Please notify patient that their lab results are normal.   Please continue current treatment and follow up.

## 2023-02-28 NOTE — TELEPHONE ENCOUNTER
----- Message from AIDEE Becerra - CNP sent at 2/28/2023 10:25 AM EST -----  Call pt - UA reviewed and negative for signs of UTI

## 2023-02-28 NOTE — PROGRESS NOTES
Jair Banuelos am scribing for and in the presence of Umesh Ashley PA-C. Subjective:     CHIEF COMPLAINT / HPI:    Chief Complaint   Patient presents with    Follow-up     HX: mitral regurg. Pt is here to follow up for an abnormal stress test and cardiac clearance. Pt is having surgery for kidney stones with Dr. Patric Fonseca at [facility] on 4/18/23. She does feel palpitations at random times. SOB with exertion sometimes. Pt denies CP, light headed/dizziness. Dear Dr. Michael Ervin, APRN - CNP    I had the pleasure of seeing Florencio Coulter in consultation today. Franky Dacosta is 75 y.o. female with past medical history of hyperlipidemia, hypertension and mitral regurgitation. No history of coronary artery disease, myocardial infarction, heart failure or significant arrhythmia. Her father had heart disease starting at 54years old. She is former smoker, 1 ppk a week. Negative stress Echo in 2015. Her risk factors for coronary artery disease includes hypertension, hyperlipidemia and family history of premature coronary artery disease. ECG done in office 9/17/2019- Normal sinus rhythm, normal ECG. Echo done on 8/6/2018- EF >55%. The LV wall thickness is mildly incrased. Moderate mitral regurg. Mild aortic stenosis. Mild aortic regurg. Mild tricuspid regurg. Evidence of mild (grade I) diastolic dysfucntion is seen. Echo done on 2/22/23: Global left ventricular systolic function appears preserved with an estimated ejection fraction of 60%. The left ventricular cavity size is within normal limits and the left ventricular wall thickness is mildly increased. Aortic leaflet calcification with mild aortic stenosis with a mean gradient of 14 mmHg. Mild aortic regurgitation was seen. Mild to moderate posteriorly directed mitral regurgitation was seen. Mild pulmonary hypertension with an estimated right ventricular systolic pressure of 37 mmHg. Mild to moderate tricuspid regurgitation.  Evidence of moderate diastolic dysfunction is seen.    Stress test done on 2/22/23:  Abnormal myocardial perfusion study. There is a small/moderate perfusion defect of mild intensity in the apical and anteroapical  regions during stress imaging, which is most consistent with ischemia,  but may be due to artifact. Global left ventricular systolic function was normal with an EF of 77% without regional wall motion abnormalities. No significant electrocardiographic evidence of myocardial ischemia during EKG monitoring without significant associated arrhythmias. Overall, these results are most consistent with a low/intermediate risk for significant coronary artery disease.  Summed difference score is 4.    Ms. Mcknight is here for cardiac clearance and to discuss test results. She does have some chest pressure  for a couple of seconds and palpitations with activity like going up and down the steps. She reports these symptoms have been worsening over the past year. Her chest pressure can last minutes, worse with activity and improves with rest. She denies any radiation of the pain. She also has shortness of breath with activity. No light headed/dizziness. No falls or near falls. She had some abdominal pian she thinks due to the kidney stone. Denies nausea or vomiting. She is planning to have kidney stone surgery with Dr. Valiente on 4/18/23 at [facility]. No blood in her urine or stool. Denies any fever or cough. Denies any black tarry stools, blood in urine or stools.      Exercise Tolerance: Ms. Mcknight reports that she has a fairly poor exercise tolerance. Her says that she could walk about 1/2 mile without developing chest discomfort but does say that significant shortness of breath would stop her from going further as well as moderate palpitations and palpitation related chest pressure.     EKG done today in office 2/28/2023: Normal sinus rhythm, borderline ECG.     Past Medical History:    Past Medical History:   Diagnosis Date     Allergic rhinitis 1992    Aphthous ulcer of mouth     DDD (degenerative disc disease), cervical 2013    H/O echocardiogram 08/11/2015    EF 65%. Aortic valve sclerosis without stenosis. Mild aortic regurgitation. Myxomatous thickening of the mitral leaflets with Moderate mitral regurgitation. Mils (grade l) diastolic dysfunction. H/O echocardiogram 09/02/2016    EF >60%. Normal LV wall thickness and cavity size. No definite specific wall motion abnormalities were identified. Left atrium is mildly dilated (29-33) left atrial volume index of 31 ml/m2. Mild aortic stenosis. Myxomatous thickening of the mitral leaflets with moderate eccentric mitral regurg. Mild treicuspid regurg. Mild diastolic dysfunction is seen. H/O echocardiogram 08/06/2018    EF >55%. The LV wall thickness is mildly incrased. Moderate mitral regurg. Mild aortic stenosis. Mild aortic regurg. Mild tricuspid regurg. Evidence of mild (grade I) diastolic dysfucntion is seen.      Hyperglycemia 2013    Hyperlipidemia     Hypertension     Mitral regurgitation 2010    MVP (mitral valve prolapse) 2010    Obesity 2010    Pulmonary hypertension ([address] 2013    Sleep apnea 2012    Uterine fibroid 2008    Venous insufficiency 2013    Wears glasses      Past Surgical History:  Past Surgical History:   Procedure Laterality Date    ABDOMEN SURGERY  10/12/2018    pelvic mass removed     COLONOSCOPY  08/13/2018    -polyp(benign lymphoid)hemorrhoids    COLONOSCOPY N/A 8/13/2018    COLONOSCOPY POLYPECTOMY SNARE/COLD BIOPSY performed by Destini Sanchez MD at [facility]  2008    Dr. hCeryl Wright ([address])  2009    HYSTERECTOMY (CERVIX STATUS UNKNOWN)  2009    supracervical, BSO with cervical conization, Dr. Zi Crowe Left 12/4/2018    ESWL [address] LITHOTRIPSY performed by Brisa Munguia MD at [facility] OR     Social History:    Social History     Tobacco Use   Smoking Status Former    Packs/day: 0.50    Years: 5.00    Pack years: 2.50    Types: Cigarettes    Quit date: 2001    Years since quittin.1   Smokeless Tobacco Never   Tobacco Comments    Pt used to only smoke socially. Family History   Problem Relation Age of Onset    High Blood Pressure Father     Heart Disease Father     Stroke Father     Dementia Mother     Other Mother         thoracic AAA    Cancer Mother         bilateral kidney     Current Medications:  Outpatient Medications Marked as Taking for the 23 encounter (Office Visit) with Chaka De Anda PA-C   Medication Sig Dispense Refill    metoprolol succinate (TOPROL XL) 25 MG extended release tablet Take 0.5 tablets by mouth daily 30 tablet 3    ZINC PO Take by mouth daily      pantoprazole (PROTONIX) 40 MG tablet Take 1 tablet by mouth every morning (before breakfast) 90 tablet 1    Cholecalciferol (VITAMIN D) 50 MCG (2000 UT) CAPS capsule Take by mouth      furosemide (LASIX) 40 MG tablet TAKE 1 TABLET BY MOUTH EVERY DAY 90 tablet 3    atorvastatin (LIPITOR) 20 MG tablet TAKE 1 TABLET BY MOUTH EVERY DAY 90 tablet 3    diclofenac sodium (VOLTAREN) 1 % GEL APPLY 2G TOPICALLY 2 TIMES DAILY 100 g 3    aspirin 81 MG tablet Take 81 mg by mouth daily      calcium carbonate (OSCAL) 500 MG TABS tablet Take 500 mg by mouth daily      Multiple Vitamins-Minerals (ALIVE ONCE DAILY WOMENS 50+ PO) Take by mouth      Ascorbic Acid (VITAMIN C) 250 MG tablet Take 250 mg by mouth daily         REVIEW OF SYSTEMS:    CONSTITUTIONAL: No major weight gain or loss, fatigue, weakness, night sweats or fever. HEENT: No new vision difficulties or ringing in the ears. RESPIRATORY: See HPI  CARDIOVASCULAR: See HPI  GI: No nausea, vomiting, diarrhea, constipation, abdominal pain or changes in bowel habits. : No urinary frequency, urgency, incontinence hematuria or dysuria.   SKIN: No cyanosis or skin lesions. MUSCULOSKELETAL: No new muscle or joint pain. NEUROLOGICAL: No syncope or TIA-like symptoms. PSYCHIATRIC: No anxiety, pain, insomnia or depression    Objective:     Physical Examination:     /75 (Site: Right Upper Arm, Position: Sitting, Cuff Size: Medium Adult)   Pulse 65   Resp 18   Ht 5' 4\" (1.626 m)   Wt 201 lb (91.2 kg)   LMP  (LMP Unknown)   SpO2 99%   BMI 34.50 kg/m²  Body mass index is 34.5 kg/m². Constitutional: She appeared oriented to person and place. She appears well-developed and well-nourished. In no acute distress. HEENT: Normocephalic and atraumatic. No JVD present. Carotid bruit is not present. No mass and no thyromegaly present. No lymphadenopathy noted. Cardiovascular: Normal rate, regular rhythm, normal heart sounds. Exam reveals no gallop and no friction rubs. 3/6 systolic murmur, 2nd intercostal space on the RIGHT just lateral to the sternum  Pulmonary/Chest: Effort normal and breath sounds normal. No respiratory distress. She has no wheezes, rhonchi or rales. Abdominal: Soft, non-tender. She exhibits no organomegaly, mass or bruit. Extremities: Trace. No cyanosis or clubbing. 2+ radial and carotid pulses. Distal extremity pulses: 2+ bilaterally. Neurological: Alertness and orientation as per Constitutional exam. No evidence of gross cranial nerve deficit. Coordination appeared normal.   Skin: Skin is warm and dry. There is no rash or diaphoresis. Psychiatric: She has a normal mood and affect.  Her speech is normal and behavior is normal.       DATA:    Lab Results   Component Value Date    ALT 28 02/08/2023    AST 21 02/08/2023    ALKPHOS 112 (H) 02/08/2023    BILITOT 0.5 02/08/2023     Lab Results   Component Value Date    CREATININE 0.76 02/08/2023    BUN 11 02/08/2023     02/08/2023    K 4.6 02/08/2023     02/08/2023    CO2 32 (H) 02/08/2023     Lab Results   Component Value Date    TSH 2.20 07/15/2015    P2IQQXM 8.9 07/15/2015     Lab Results   Component Value Date    WBC 6.0 02/08/2023    HGB 13.7 02/08/2023    HCT 42.0 02/08/2023    MCV 89.9 02/08/2023     02/08/2023       Lab Results   Component Value Date    TRIG 76 02/08/2023    TRIG 88 10/31/2022    TRIG 77 08/04/2021     Lab Results   Component Value Date    HDL 42 02/08/2023    HDL 43 10/31/2022    HDL 47 08/04/2021     Lab Results   Component Value Date    LDLCHOLESTEROL 64 02/08/2023    LDLCHOLESTEROL 55 10/31/2022    LDLCHOLESTEROL 54 08/04/2021         Assessment:      Diagnosis Orders   1. Preoperative clearance  EKG 12 lead    Referral to Cardiac Cath      2. Abnormal stress test  EKG 12 lead    Referral to Cardiac Cath      3. Moderate mitral regurgitation  EKG 12 lead    Referral to Cardiac Cath      4. Essential hypertension  EKG 12 lead    Referral to Cardiac Cath      5. SOB (shortness of breath)  EKG 12 lead    Referral to Cardiac Cath      6. Palpitation  EKG 12 lead    Referral to Cardiac Cath      7. Class 1 obesity without serious comorbidity with body mass index (BMI) of 34.0 to 34.9 in adult, unspecified obesity type  EKG 12 lead    Referral to Cardiac Cath        Plan:   Pre-Op Clearance: Ms. Mcknight is here today for cardiac clearance for kidney stone surgery with Dr. Valiente on 4/18/2023 at [facility].   Pre-Operative Risk assessment using 2014 ACC/AHA guidelines   Emergent procedure NO  Active Cardiac Condition NO (decompensated HF, Arrhythmia, MI <3 weeks, severe valve disease)  Risk Level of Procedure Low Risk (endoscopy, superficial skin, breast, ambulatory, or cataract, etc.)  Revised Cardiac Risk Index Risk factors: None  Measurement of Exercise Tolerance before Surgery >4 YES  According to the 2014 ACC/AHA pre-operative risk assessment guidelines Annabel Mcknight is at low risk for major cardiac complications during a low risk procedure, however she had an abnormal stress test and required further testing. Specific medication recommendations  are listed below. Medications recommended to continue should be taken with a sip of water even when NPO. Medical management to reduce perioperative risk:  Antiplatelet Agent: I would prefer that her aspirin 81 mg be continued throughout the perioperative period but if bleeding risk is to high, this can be stopped 5-7 days prior to the planned procedure but please restart this as soon as safely possible. Anticoagulant Agent: Not applicable prior to the procedure. Anticoagulation Bridging: Not applicable  Additional Recommendations: I would also suggest that she continue her beta blocker and statin throughout the perioperative period. Proceed with heart cath as discussed below due to her abnormal stress test.    Abnormal Stress Test:   For these reasons, I recommended that the patient consider undergoing a cardiac catheterization with coronary angiography to assess their coronary anatomy and facilitate better treatment recommendations. I discussed the risks, benefits, and alternatives to the procedure including the risk of bleeding, contrast induced allergy and/or kidney damage, arrythmia, stroke, heart attack, death, or the need for further procedures. I also discussed the fact that although treatment with simple medical management is a potential treatment option in place of cardiac catheterization, I expressed my opinion that cardiac catheterization in order to define their coronary anatomy and rule out severe 3 vessel or left main coronary artery disease would significant help guide the most appropriate treatment strategy ranging from no treatment, to medications, stents, to even coronary bypass surgery. The patient verbalized understanding of the risks benefits and alternatives and stated that they would like to undergo the procedure. We will plan to schedule the procedure here at [facility] on 3/2/2023.   Risk factors: smoking/ tobacco exposure, family history, dyslipidemia, obesity, sedentary life style, hypertension, post-menopausal   Medical Management for suspected Coronary artery disease and myocardial ischemia:  Antiplatelet Agent: Continue Aspirin 81 mg daily. Beta Blocker: START Metoprolol succinate (Toprol XL) 25 mg 1/2 tab daily. I also discussed the potential side effects of this medication including lightheadedness and dizziness and instructed them to stop the medication of this occurs and call our office if this occurs. Anti-anginal medications: Not indicated at this time. Cholesterol Reduction Therapy: Continue Atorvastatin (Lipitor) 20 mg daily. Additional Testing List: None    Non-Rheumatic Mitral Regurgitation and Non-Rheumatic Aortic Valve Stenosis:    Diuretics: Continue furosemide (lasix) 40 mg once daily. I also discussed the potential side effects of this medication including lightheadedness and dizziness and told her to call the office if this occurs. Antiplatelet Agent: Continue aspirin 81 mg daily. I also reminded her to watch for signs of blood in her stool or black tarry stools and stop the medication immediately if this develops as this could be life threatening. No evidence of volume overload or heart failure. Essential Hypertension: Controlled  Beta Blocker: START Metoprolol succinate (Toprol XL) 25 mg 1/2 tab daily. ACE Inibitor/ARB: Not indicated at this time. Calcium Channel Blocker: Not indicated at this time. Diuretics: Continue furosemide (Lasix) 40 mg every morning. Additional Testing List: None    Shortness of breath with mild exertion:  Heart cath as above. Recurrent intermittent palpitations: Moderately bothersome both with exertion and with laying down at night  Beta Blocker: START Metoprolol succinate (Toprol XL) 25 mg 1/2 tab daily. Calcium Channel Blocker: Not indicated at this time. She is still wearing the CAM monitor, will reach out with results. Obesity: Body mass index is 34.5 kg/m².    I also briefly discussed both diet and exercise strategies for her to continue to loses weight and she was very receptive to this. Finally, I recommended that she continue her other medications and follow up with you as previously scheduled. FOLLOW UP:   I told Ms. Beto Hendrix to call my office if she had any problems, but otherwise told her to Return in about 2 weeks (around 3/14/2023). However, I would be happy to see her sooner should the need arise. Sincerely,  Lauren Clark PA-C. [facility]) [facility], [address] [place] [address]  Phone: [phone], Fax: [phone]    I believe that the risk of significant morbidity and mortality related to the patient's current medical conditions are: intermediate-high. Approximately 45 minutes were spent during prep work, discussion and exam of the patient, and follow up documentation and all of their questions were answered. The documentation recorded by the scribe, accurately and completely reflects the services I personally performed and the decisions made by me.  Lauren Clark PA-C February 28, 2023

## 2023-03-02 ENCOUNTER — HOSPITAL ENCOUNTER (OUTPATIENT)
Dept: CARDIAC CATH/INVASIVE PROCEDURES | Age: 68
Discharge: HOME | End: 2023-03-02
Attending: FAMILY MEDICINE | Admitting: FAMILY MEDICINE
Payer: MEDICARE

## 2023-03-02 VITALS
HEART RATE: 65 BPM | TEMPERATURE: 96.8 F | OXYGEN SATURATION: 97 % | RESPIRATION RATE: 16 BRPM | DIASTOLIC BLOOD PRESSURE: 57 MMHG | SYSTOLIC BLOOD PRESSURE: 135 MMHG

## 2023-03-02 PROBLEM — R94.39 ABNORMAL CARDIOVASCULAR STRESS TEST: Status: ACTIVE | Noted: 2023-03-02

## 2023-03-02 PROCEDURE — 99152 MOD SED SAME PHYS/QHP 5/>YRS: CPT

## 2023-03-02 PROCEDURE — C1894 INTRO/SHEATH, NON-LASER: HCPCS

## 2023-03-02 PROCEDURE — 2709999900 HC NON-CHARGEABLE SUPPLY

## 2023-03-02 PROCEDURE — 93458 L HRT ARTERY/VENTRICLE ANGIO: CPT | Performed by: FAMILY MEDICINE

## 2023-03-02 PROCEDURE — 93458 L HRT ARTERY/VENTRICLE ANGIO: CPT

## 2023-03-02 PROCEDURE — C1769 GUIDE WIRE: HCPCS

## 2023-03-02 PROCEDURE — 2500000003 HC RX 250 WO HCPCS

## 2023-03-02 PROCEDURE — 6360000002 HC RX W HCPCS

## 2023-03-02 PROCEDURE — 2580000003 HC RX 258: Performed by: FAMILY MEDICINE

## 2023-03-02 PROCEDURE — 99153 MOD SED SAME PHYS/QHP EA: CPT

## 2023-03-02 PROCEDURE — 6360000004 HC RX CONTRAST MEDICATION: Performed by: FAMILY MEDICINE

## 2023-03-02 RX ORDER — SODIUM CHLORIDE 0.9 % (FLUSH) 0.9 %
5-40 SYRINGE (ML) INJECTION PRN
Status: DISCONTINUED | OUTPATIENT
Start: 2023-03-02 | End: 2023-03-03 | Stop reason: HOSPADM

## 2023-03-02 RX ORDER — SODIUM CHLORIDE 0.9 % (FLUSH) 0.9 %
5-40 SYRINGE (ML) INJECTION EVERY 12 HOURS SCHEDULED
Status: DISCONTINUED | OUTPATIENT
Start: 2023-03-02 | End: 2023-03-03 | Stop reason: HOSPADM

## 2023-03-02 RX ORDER — SODIUM CHLORIDE 9 MG/ML
INJECTION, SOLUTION INTRAVENOUS CONTINUOUS
Status: DISCONTINUED | OUTPATIENT
Start: 2023-03-02 | End: 2023-03-03 | Stop reason: HOSPADM

## 2023-03-02 RX ORDER — SODIUM CHLORIDE 9 MG/ML
INJECTION, SOLUTION INTRAVENOUS PRN
Status: DISCONTINUED | OUTPATIENT
Start: 2023-03-02 | End: 2023-03-03 | Stop reason: HOSPADM

## 2023-03-02 RX ORDER — NITROGLYCERIN 0.4 MG/1
0.4 TABLET SUBLINGUAL EVERY 5 MIN PRN
Status: DISCONTINUED | OUTPATIENT
Start: 2023-03-02 | End: 2023-03-03 | Stop reason: HOSPADM

## 2023-03-02 RX ORDER — DIPHENHYDRAMINE HCL 50 MG
50 CAPSULE ORAL ONCE
Status: DISCONTINUED | OUTPATIENT
Start: 2023-03-02 | End: 2023-03-03 | Stop reason: HOSPADM

## 2023-03-02 RX ORDER — ACETAMINOPHEN 325 MG/1
650 TABLET ORAL EVERY 4 HOURS PRN
Status: DISCONTINUED | OUTPATIENT
Start: 2023-03-02 | End: 2023-03-03 | Stop reason: HOSPADM

## 2023-03-02 RX ORDER — AMLODIPINE BESYLATE 2.5 MG/1
2.5 TABLET ORAL DAILY
Qty: 30 TABLET | Refills: 11 | Status: SHIPPED | OUTPATIENT
Start: 2023-03-02

## 2023-03-02 RX ADMIN — SODIUM CHLORIDE: 9 INJECTION, SOLUTION INTRAVENOUS at 14:15

## 2023-03-02 RX ADMIN — IOPAMIDOL 80 ML: 755 INJECTION, SOLUTION INTRAVENOUS at 15:15

## 2023-03-02 NOTE — OP NOTE
-  Coronary Angiography Brief Post Operative Note:    Severe single vessel coronary artery disease involving a mid 70-80% stenosis in the left anterior descending coronary artery. Normal left ventricular end diastolic pressure. Consult to interventional cardiology for consideration of angioplasty and/or stenting of the patients severe stenosis.      Electronically signed by Darlene Stevens MD on 3/2/2023 at 3:57 PM

## 2023-03-02 NOTE — PROGRESS NOTES
Patient returned to pre/post area. Alert and oriented, denies pain. Right radial palpable distal to puncture site. TR band in place no bleeding/bruising noted. Will continue to monitor.

## 2023-03-02 NOTE — DISCHARGE INSTRUCTIONS
Discharge Instructions for Cardiac Catheterization    A cardiac catheterization is a diagnostic test used to evaluate the health of the heart and its blood vessels. The test is done with a thin catheter carefully threaded into your heart from a leg or arm artery. Most likely, you will be allowed to go home the same day as the procedure. Steps to Take at Home:   Pain- apply ice to site 15-20 minutes every hour for the first 2 days. Showering is okay 24 hours after procedure. No soaking in a pool, hot tub, bath tub, or standing water for one week. Bleeding (outward or under the skin-hematoma)- apply firm pressure for 10-15 minutes or until the bleeding stops, then call your doctor. If unable to get bleeding stopped, call 911. Kidney damage- Call if you urinate less than normal, have swelling or feel puffy, and/or gain 2 or more pounds over night in the first week. If procedure was in ARM:  You were instructed to keep wrist straight and still for two hours after the procedure. The arm and hand may now be used for normal daily activities except, avoid using the heal of hand while getting up and down from furniture for the first few days. Keep affected arm elevated, hand higher than elbow, while pressure dressing in place to decrease swelling. TR Band- After first 30 minutes, pressure will be released from    device every 15 until completely decompressed. Inflation tube    may be cut prior to discharge but band left in place. **Band can be removed after 4 hours:    TIME _______9:15 pm_______________________  *** If hand of procedure site becomes numb, tingly, and/or cold, seek medical help. Wash area with soap and water daily while leaving it open to air, no bandaids or ointment. Diet   Drink plenty of fluids after the test to flush the x-ray dye from your system. Return to your normal diet. No alcoholic beverages for 24 hours after the procedure.   Physical Activity   The sedative will make you sleepy. Rest until the effects have worn off. Nausea and vomiting from the sedative is normal and usually does not last long. Ask your doctor when you will be able to return to work. Do not drive, operate machinery, do anything that requires attention to detail, or sign important papers for at least 24 hours or until your doctor says it is safe. Avoid heavy lifting, physically demanding activities, and sexual activity for 2-3 days. Lift nothing over 10 pounds (a gallon of milk is 8 pounds). Do not sit for long periods of time. Try to change positions frequently. Medications   Resume taking your normal medicines as advised. Use acetaminophen (Tylenol) for pain relief. (Avoid anti-inflammatory drugs such as- ibuprofen (Advil, Motrin), naproxen sodium (Aleve), Excedrin for a few days)  If you had to stop taking these medications before the procedure, ask your doctor when you can resume taking them:   Anti-inflammatory drugs   Blood thinners, such as warfarin (Coumadin)   If you are taking medicines, follow these general guidelines:   Take your medicine as directed. Do not change the amount or the schedule. Do not stop taking them without talking to your doctor. Do not share them. Know the side effects and report any to your doctor. Some drugs can be dangerous when mixed. Talk to a doctor or pharmacist if you are taking more than one drug. This includes over-the-counter medicine and herb or dietary supplements. Plan ahead for refills so you don't run out. Follow-up   The test results are available right after the procedure. At that point, the doctor will discuss the findings and suggest appropriate treatment options. In some cases, the results can indicate an immediate need for surgery. Schedule a follow-up appointment as directed by your doctor.    Call Your Doctor If Any of the Following Occurs   Signs of infection- including fever and chills   Redness, swelling, increasing pain, feels warm to touch, red streak forming from site, or any discharge from the procedure site.    Call 911 If Any of the Following Occurs   Drooping facial muscles   Changes in vision or speech   Difficulty walking or using your limbs   Change in sensation, including numbness, feeling cold, or change in color   Extreme sweating, nausea or vomiting   Dizziness or lightheadedness   Chest pain   Rapid, irregular heartbeat   Palpitations   Cough, shortness of breath, or difficulty breathing   Weakness or fainting   If you think you have an emergency, CALL 911

## 2023-03-07 ENCOUNTER — HOSPITAL ENCOUNTER (OUTPATIENT)
Dept: CARDIAC CATH/INVASIVE PROCEDURES | Age: 68
Discharge: HOME | End: 2023-03-07
Payer: MEDICARE

## 2023-03-07 VITALS
DIASTOLIC BLOOD PRESSURE: 74 MMHG | SYSTOLIC BLOOD PRESSURE: 114 MMHG | BODY MASS INDEX: 33.29 KG/M2 | RESPIRATION RATE: 19 BRPM | HEART RATE: 72 BPM | OXYGEN SATURATION: 99 % | HEIGHT: 64 IN | TEMPERATURE: 97.9 F | WEIGHT: 195 LBS

## 2023-03-07 LAB — ACTIVATED CLOTTING TIME: 275 SEC (ref 79–149)

## 2023-03-07 PROCEDURE — 7100000010 HC PHASE II RECOVERY - FIRST 15 MIN

## 2023-03-07 PROCEDURE — 6360000004 HC RX CONTRAST MEDICATION

## 2023-03-07 PROCEDURE — C1887 CATHETER, GUIDING: HCPCS

## 2023-03-07 PROCEDURE — C1894 INTRO/SHEATH, NON-LASER: HCPCS

## 2023-03-07 PROCEDURE — C1769 GUIDE WIRE: HCPCS

## 2023-03-07 PROCEDURE — 85347 COAGULATION TIME ACTIVATED: CPT

## 2023-03-07 PROCEDURE — C1874 STENT, COATED/COV W/DEL SYS: HCPCS

## 2023-03-07 PROCEDURE — 6370000000 HC RX 637 (ALT 250 FOR IP)

## 2023-03-07 PROCEDURE — 2709999900 HC NON-CHARGEABLE SUPPLY

## 2023-03-07 PROCEDURE — 2500000003 HC RX 250 WO HCPCS

## 2023-03-07 PROCEDURE — 6360000002 HC RX W HCPCS

## 2023-03-07 PROCEDURE — 7100000011 HC PHASE II RECOVERY - ADDTL 15 MIN

## 2023-03-07 RX ORDER — ACETAMINOPHEN 325 MG/1
650 TABLET ORAL EVERY 4 HOURS PRN
Status: DISCONTINUED | OUTPATIENT
Start: 2023-03-07 | End: 2023-03-08 | Stop reason: HOSPADM

## 2023-03-07 RX ORDER — SODIUM CHLORIDE 9 MG/ML
INJECTION, SOLUTION INTRAVENOUS CONTINUOUS
Status: DISCONTINUED | OUTPATIENT
Start: 2023-03-07 | End: 2023-03-08 | Stop reason: HOSPADM

## 2023-03-07 RX ORDER — SODIUM CHLORIDE 0.9 % (FLUSH) 0.9 %
5-40 SYRINGE (ML) INJECTION PRN
Status: CANCELLED | OUTPATIENT
Start: 2023-03-07

## 2023-03-07 RX ORDER — SODIUM CHLORIDE 9 MG/ML
INJECTION, SOLUTION INTRAVENOUS PRN
Status: DISCONTINUED | OUTPATIENT
Start: 2023-03-07 | End: 2023-03-08 | Stop reason: HOSPADM

## 2023-03-07 RX ORDER — SODIUM CHLORIDE 0.9 % (FLUSH) 0.9 %
5-40 SYRINGE (ML) INJECTION EVERY 12 HOURS SCHEDULED
Status: CANCELLED | OUTPATIENT
Start: 2023-03-07

## 2023-03-07 NOTE — PROGRESS NOTES
[x] DISCHARGE INSTRUCTIONS GIVEN WITH 2 WEEK APPT. MADE AND . DOCUMENTED     [x] STENT/PCI GUIDELINES GIVEN    [x] ASA  PRESCRIBED POST PROCEDURE                Currently taking 81 mg  [x] WAS A BETA BLOCKER ORDERED IF YES WAS SCRIPT GIVEN TO PT.                 Currently taking Toprol XL    [] IF EF < 45 % WAS AN ACE INHIBITOR ORDERED    [x] WAS PLAVIX,EFFIENT,BRILINTA, ORDERED IF YES WAS SCRIPT GIVEN TO       PT,AND INSTRUCTIONS ON IMPORTANCE OF MED              Prescribed Brilinta to  at Pharmacy    [] DOES PT. NEED 30 & 90 DAY SCRIPTS    [] CONCERNS ON PURCHASE OF ANTIPLATELETS IF YES.  SEE PROCEDURE ON      PROCESS FOR PT.S TO OBTAIN THESE MEDS    [x] IS PT. ON STATINS IF NO WERE STATINS ORDERED AND SCRIPT GIVEN                  Currently taking Lipitor    [x] WERE MEDS RECONCILED, WAS [employer] INFORMATION ON MEDS GIVEN     TO PT.    [x] PRINT DISCHARGE MED LIST CHECK AVS FOR CURRENT MEDS    [x] WAS STENT CARD GIVEN TO PT.

## 2023-03-07 NOTE — OP NOTE
[facility]    CARDIAC CATHETERIZATION    Date:   3/7/2023  Patient name:  Annabel Mcknight  Date of admission:  3/7/2023 12:16 PM  MRN:   [id number]  YOB: 1955    Operators:  Primary:   Dr. Siddiqui           Procedure performed:     [] Left Heart Catheterization.   [] Graft Angiography.  [] Left Ventriculography.     [] Right Heart Catheterization.  [] Coronary Angiography.    [] Aortic Valve Studies.  [x] PCI:      [] Other:       Pre Procedure Conscious Sedation Data:  ASA Class:    [] I [] II [x] III [] IV    Mallampati Class:  [x] I [] II [] III [] IV      Indication:  - PCI      Procedure:  Access:  [] Femoral  [x] Radial  artery       [x] Right  [] Left    Procedure: After informed consent was obtained with explanation of the risks and benefits, patient was brought to the cath lab. The access area was prepped and draped in sterile fashion. 1% lidocaine was used for local block. The artery was cannulated with 6  Fr sheath with brisk arterial blood return. The side port was frequently flushed and aspirated with normal saline.      Findings:  Angiographic Findings        Cardiac Arteries and Lesion Findings       LAD:         Lesion on Mid LAD: Mid subsection.80% stenosis 15 mm length reduced to     0%. Pre procedure CAS II flow was noted. Post Procedure CAS III flow     was present. Good runoff was present. The lesion was diagnosed as     Moderate Risk (B).         Devices used         - Luge Wire 182 cm. Number of passes: 1.         - [place] Juab 2.5 x 26mm. 1 inflation(s) to a max pressure of: 12     monserrat.        Coronary Tree        Dominance: Right        Conclusions        Procedure Summary        Successful PTCA -AMARILIS mid LAD        Recommendations        Post stent protocol         Estimated Blood Loss: 10 mL        ____________________________________________________________________    History and Risk Factors    [x] Hypertension     [] Family history of CAD  [x]  Hyperlipidemia     [] Cerebrovascular Disease   [] Prior MI       [] Peripheral Vascular disease   [] Prior PCI              [] Diabetes Mellitus    [] Left Main PCI.     [] Currently on Dialysis.  [] Prior CABG.      [] Currently smoker.    [] Cardiac Arrest outside of healthcare facility. [] Yes    [x] No        Witnessed     [] Yes   [] No     Arrest after arrival of EMS  [] Yes   [] No     [] Cardiac Arrest at other Facility.    [] Yes   [x] No    Pre-Procedure Information.  Heart Failure       [] Yes    [x] No        Class  [] I      [] II  [] III    [] IV.     New Diagnosis    [] Yes  [] No    HF Type      [] Systolic   [] Diastolic          [] Unknown.    Diagnostic Test:   EKG       [] Normal   [x] Abnormal    New antiarrhythmia medications:    [] Yes   [] No   New onset atrial fibrillation / Flutter     [] Yes   [] No   ECG Abnormalities:      [] V. Fib   [] Diana V. Tach           [] NS V. T   [] New LBBB           [] T. Inv  []  ST dev > 0.5 mm         [] PVC's freq  [] PVC's infrequent    Stress Test Performed:      [] Yes    [x] No     Type:     [] Stress Echo   [] Exercise Stress Test (no imaging)      [] Stress Nuclear  [] Stress Imaging     Results   [] Negative   [] Positive        [] Indeterminate  [] Unavailable     If Positive/ Risk / Extent of Ischemia:       [] Low  [] Intermediate         [] High  [] Unavailable      Cardiac CTA Performed:     [] Yes    [x] No      Results   [] CAD   [] Non obstructive CAD      [] No CAD   [] Uncertain      [] Unknown   [] Structural Disease.     Pre Procedure Medications:   [x] Yes    [] No         [x] ASA   [] Beta Blockers      [] Nitrate   [] Ca Channel Blockers      [] Ranolazine   [] Statin       [] Plavix/Others antiplatelets      Electronically signed on 3/7/2023 at 3:02 PM by:    Anuel Mcintyre MD  Fellow, Cardiovascular Diseases  [facility]

## 2023-03-07 NOTE — H&P
Attestation signed by    [facility]    Consult / H&P               Today's Date: 3/7/2023  Patient Name: Blake Medina  Date of admission: 3/7/2023 12:16 PM  Patient's age: 75 y. o., 1955  Admission Dx: CAD (coronary artery disease) [I25.10]    Reason for Consult:  Cardiac evaluation    Requesting Physician: No admitting provider for patient encounter. CHIEF COMPLAINT:  Elective PCI     History Obtained From:  patient    HISTORY OF PRESENT ILLNESS:      The patient is a 75 y.o. female is here for elective PCI   She had cardiac cath done by Dr. Marquis Sosa and it showed 70-80% mid LAD stenosis    Past Medical History:   has a past medical history of Allergic rhinitis, Aphthous ulcer of mouth, CAD (coronary artery disease), H/O echocardiogram, H/O echocardiogram, H/O echocardiogram, Hyperglycemia, Hyperlipidemia, Hypertension, Mitral regurgitation, MVP (mitral valve prolapse), Obesity, Pulmonary hypertension ([address] S/P cardiac cath, Sleep apnea, Uterine fibroid, Venous insufficiency, and Wears glasses. Past Surgical History:   has a past surgical history that includes hysteroscopy; Dilation and curettage of uterus (2008); Hysterectomy (2009); Colonoscopy (08/13/2018); Colonoscopy (N/A, 08/13/2018); Abdomen surgery (10/12/2018); Lithotripsy (Left); pr lithotripsy xtrcorp shock wave (Left, 12/04/2018); Coronary angioplasty with stent (03/07/2023); and Cardiac catheterization (03/02/2023). Home Medications:    Prior to Admission medications    Medication Sig Start Date End Date Taking?  Authorizing Provider   amLODIPine (NORVASC) 2.5 MG tablet Take 1 tablet by mouth daily 3/2/23   Harris Anderson MD   metoprolol succinate (TOPROL XL) 25 MG extended release tablet Take 0.5 tablets by mouth daily 2/28/23   Cosme Francisco PA-C   ZINC PO Take by mouth daily    Historical Provider, MD   pantoprazole (PROTONIX) 40 MG tablet Take 1 tablet by mouth every morning (before breakfast) 2/13/23   Terrilee Sandifer Valeria Eckert, APRN - CNP   Cholecalciferol (VITAMIN D) 50 MCG (2000 UT) CAPS capsule Take by mouth    Historical Provider, MD   furosemide (LASIX) 40 MG tablet TAKE 1 TABLET BY MOUTH EVERY DAY 11/8/22   AIDEE Arzate CNP   atorvastatin (LIPITOR) 20 MG tablet TAKE 1 TABLET BY MOUTH EVERY DAY 9/2/22   AIDEE Arzate CNP   diclofenac sodium (VOLTAREN) 1 % GEL APPLY 2G TOPICALLY 2 TIMES DAILY 12/7/21   Rhianna Umaña MD   aspirin 81 MG tablet Take 81 mg by mouth daily    Historical Provider, MD   calcium carbonate (OSCAL) 500 MG TABS tablet Take 500 mg by mouth daily    Historical Provider, MD   Multiple Vitamins-Minerals (ALIVE ONCE DAILY WOMENS 50+ PO) Take by mouth    Historical Provider, MD   Ascorbic Acid (VITAMIN C) 250 MG tablet Take 250 mg by mouth daily    Historical Provider, MD      Current Facility-Administered Medications: 0.9 % sodium chloride infusion, , IntraVENous, Continuous    Allergies:  Seasonal    Social History:   reports that she quit smoking about 22 years ago. Her smoking use included cigarettes. She has a 2.50 pack-year smoking history. She has never used smokeless tobacco. She reports that she does not drink alcohol and does not use drugs. Family History: family history includes Cancer in her mother; Dementia in her mother; Heart Disease in her father; High Blood Pressure in her father; Other in her mother; Stroke in her father. No h/o sudden cardiac death. No for premature CAD    REVIEW OF SYSTEMS:    Constitutional: there has been no unanticipated weight loss. There's been No change in energy level, No change in activity level. Eyes: No visual changes or diplopia. No scleral icterus. ENT: No Headaches  Cardiovascular: No cardiac history  Respiratory: No previous pulmonary problems, No cough  Gastrointestinal: No abdominal pain. No change in bowel or bladder habits. Genitourinary: No dysuria, trouble voiding, or hematuria.   Musculoskeletal:  No gait disturbance, No weakness or joint complaints. Integumentary: No rash or pruritis. Neurological: No headache, diplopia, change in muscle strength, numbness or tingling. No change in gait, balance, coordination, mood, affect, memory, mentation, behavior. Psychiatric: No anxiety, or depression. Endocrine: No temperature intolerance. No excessive thirst, fluid intake, or urination. No tremor. Hematologic/Lymphatic: No abnormal bruising or bleeding, blood clots or swollen lymph nodes. Allergic/Immunologic: No nasal congestion or hives. PHYSICAL EXAM:      BP (!) 161/68   Pulse 69   Temp 97.9 °F (36.6 °C) (Oral)   Resp 12   Ht 5' 4\" (1.626 m)   Wt 195 lb (88.5 kg)   LMP  (LMP Unknown)   SpO2 99%   BMI 33.47 kg/m²    Constitutional and General Appearance: alert, cooperative, no distress and appears stated age  HEENT: PERRL, no cervical lymphadenopathy. No masses palpable. Normal oral mucosa  Respiratory:  Normal excursion and expansion without use of accessory muscles  Resp Auscultation: Good respiratory effort. No for increased work of breathing. On auscultation: clear to auscultation bilaterally  Cardiovascular: The apical impulse is not displaced  Heart tones are crisp and normal. regular S1 and S2.  Jugular venous pulsation Normal  The carotid upstroke is normal in amplitude and contour without delay or bruit  Peripheral pulses are symmetrical and full   Abdomen:   No masses or tenderness  Bowel sounds present  Extremities:   No Cyanosis or Clubbing   Lower extremity edema: No   Skin: Warm and dry  Neurological:  Alert and oriented. Moves all extremities well  No abnormalities of mood, affect, memory, mentation, or behavior are noted    Labs:     CBC: No results for input(s): WBC, HGB, HCT, PLT in the last 72 hours. BMP: No results for input(s): NA, K, CO2, BUN, CREATININE, LABGLOM, GLUCOSE in the last 72 hours. BNP: No results for input(s): BNP in the last 72 hours.   PT/INR: No results for input(s): PROTIME, INR in the last 72 hours. APTT:No results for input(s): APTT in the last 72 hours. CARDIAC ENZYMES:No results for input(s): CKTOTAL, CKMB, CKMBINDEX, TROPONINI in the last 72 hours. FASTING LIPID PANEL:  Lab Results   Component Value Date/Time    HDL 42 02/08/2023 08:42 AM    TRIG 76 02/08/2023 08:42 AM     LIVER PROFILE:No results for input(s): AST, ALT, LABALBU in the last 72 hours. IMPRESSION:    Patient Active Problem List   Diagnosis    Hyperglycemia    Obstructive sleep apnea    Other dyspnea and respiratory abnormality    Essential hypertension    Obesity    Other functional disorder of bladder    Allergic rhinitis    Symptomatic menopausal or female climacteric states    Congenital mitral insufficiency    Sleep apnea    MVP (mitral valve prolapse)    Uterine fibroid    Moderate mitral regurgitation    DDD (degenerative disc disease), cervical    Venous insufficiency    Anxiety    Hyperlipidemia    Hearing loss    Osteopenia    Kidney stone    Eczema    Inflammatory arthritis    Mild aortic stenosis    Osteoarthritis of knee    Aphthous ulcer    COVID-19    Abnormal cardiovascular stress test   CATH 03/23/2023  Coronary Angiography Brief Post Operative Note:     Severe single vessel coronary artery disease involving a mid 70-80% stenosis in the left anterior descending coronary artery. Normal left ventricular end diastolic pressure. Consult to interventional cardiology for consideration of angioplasty and/or stenting of the patients severe stenosis. Assessment     Single vessel CAD (mid 70-80% stenosis in the left anterior descending coronary artery.)  HTN   HLD        RECOMMENDATIONS:  Proceed with PCI   Follow post cath orders. Discussed with patient and Nurse.     Electronically signed by Trace Flower MD on 3/7/2023 at 2:32 PM    [facility]  \

## 2023-03-07 NOTE — PROGRESS NOTES
Patient admitted, consent signed and questions answered. Patient ready for procedure. Call light to reach with side rails up 2 of 2. Bilateral groin  clipped with writer and Barbara present. Family at bedside with patient. History and physical needs to be completed. [place]

## 2023-03-07 NOTE — PROGRESS NOTES
Patient received post cath to [facility] room 4. Assessment obtained. Post cath pathway initiated. Right radial site with Vascband intact with 9 ml of air No hematoma noted. Restrictions reviewed with patient and family. Patient without complaints.

## 2023-03-07 NOTE — DISCHARGE INSTRUCTIONS
DISCHARGE INSTRUCTIONS / ARM CARE POST CATHERIZATION        ENCOURAGE FLUIDS    NO STRENUOUS LIFTING WITH AFFECTED ARM FOR 3 DAYS ANYTHING HEAVIER THAN 8 TO 10 POUNDS    REMOVE BAND-AID/PRESSURE DRESSING THE FOLLOWING DAY AND DO NOT APPLY ANY FURTHER BAND-AIDS    KEEP INCISION CLEAN DRY AND OPEN TO THE AIR / NO HAND LOTION NEAR PUNCTURE SITE    WATCH FOR SIGNS OF INFECTION /  REDNESS / SWELLING / DRAINAGE / [place]na [place] / TEMPERATURE GREATER THAN 101    IF BLEEDING OCCURS HOLD MANUAL PRESSURE DIRECTLY OVER SITE  (YOU WILL FEEL PULSATION OF ARTERY) AND IF BLEEDING DOES NOT STOP AFTER 2 MINUTES CALL 911    OK TO SHOWER THE NEXT DAY, NO TUB BATHING OR HOT TUBS/SWIMMING FOR 7 DAYS    IF AREA BECOMES HARD AND SWOLLEN AND IF YOU ARE AT ALL CONCERNED SEEK HELP IMMEDIATELY    SEEK HELP IMMEDIATELY IF AFFECTED ARM BECOMES COLD / Mollie Bre / SEVERE PAIN / NAILBEDS TURN BLUE     IF ON METFORMIN / GLUCOPHAGE DO NOT RESTART MEDICATION FOR 48 HOURS    PLEASE PRACTICE GOOD HAND WASHING AND INCLUDE PUNCTURE SITE ESPECIALLY AFTER USING THE RESTROOM    AVOID USING ALCOHOL BASED HAND SANITIZERS FOR ONE WEEK      CALL 911 if you have symptoms including:   Drooping facial muscles   Changes in vision or speech   Difficulty walking or using your limbs   Change in sensation to affected leg, including numbness, feeling cold, or change in color   Extreme sweating, nausea or vomiting   Dizziness or lightheadedness   Chest pain   Rapid, irregular heartbeat   Palpitations   Cough, shortness of breath, or difficulty breathing   Weakness or fainting   If you think you have an emergency, CALL 911 . SEDATION / ANALGESIA INFORMATION / [address] 84 have received the sedation/analgesia medication during your visit    Sedation/analgesia is used during short medical procedures under controlled supervision. The medication will produce a strong relaxation.  You will be able to hear, speak and follow instructions, but your memory and alertness will be decreased. You will be able to swallow and breathe on your own. During sedation/analgesia your blood pressure, heart and breathing will be watched closely. After the procedure, you may not remember what was said or done. You may have the following effects from the medication. \" Drowsiness, dizziness, sleepiness or confusion. \" Difficulty remembering or delayed reaction times. \" Loss of fine muscle control or difficulty with your balance especially while walking. \" Difficulty focusing or blurred vision. You may not be aware of slight changes in your behavior and/or your reaction time because of the medication used during the procedure. Therefore you should follow these instructions. \" Have someone responsible help you with your care. \" Do not drive for 24 hours. \" Do not operate equipment for 24 hours (lawnmowers, power tools, kitchen accessories, stove). \" Do not drink any alcoholic beverages for a minimum of 24 hours. \" Do not make important personal, legal or business decisions for 24 hours. \" You may experience dizziness or lightheadedness. Move slowly and carefully, do not make sudden position changes. \" Drink extra amounts of fluids today. \" Increase your diet as tolerated (unless you have received specific instructions from your doctor). \" If you feel nauseated, continue with liquids until the nausea is gone. \" Notify your physician if you have not urinated within 8 hours after the procedure. \" Resume your medications unless otherwise instructed. Coronary artery disease (CAD) occurs when plaque builds up in the arteries that bring oxygen-rich blood to your heart. Plaque is a fatty substance made of cholesterol, calcium, and other substances in the blood. This process is called hardening of the arteries, or atherosclerosis. What happens when you have coronary artery disease? Plaque may narrow the coronary arteries. Narrowed arteries cause poor blood flow.  This can lead to angina symptoms such as chest pain or discomfort. If blood flow is completely blocked, you could have a heart attack. You can slow CAD and reduce the risk of future problems by making changes in your lifestyle. These include quitting smoking and eating heart-healthy foods. Treatments for CAD, along with changes in your lifestyle, can help you live a longer and healthier life. How can you prevent coronary artery disease? Do not smoke. It may be the best thing you can do to prevent heart disease. If you need help quitting, talk to your doctor about stop-smoking programs and medicines. These can increase your chances of quitting for good. Be active. Get at least 30 minutes of exercise on most days of the week. Walking is a good choice. You also may want to do other activities, such as running, swimming, cycling, or playing tennis or team sports. Eat heart-healthy foods. Eat more fruits and vegetables and less foods that contain saturated and trans fats. Limit alcohol, sodium, and sweets. Stay at a healthy weight. Lose weight if you need to. Manage other health problems such as diabetes, high blood pressure, and high cholesterol. Talk to your doctor about taking a daily aspirin. Manage stress. Stress can hurt your heart. To keep stress low, talk about your problems and feelings. Don't keep your feelings hidden. How is coronary artery disease treated? Your doctor will suggest that you make lifestyle changes. For example, your doctor may ask you to eat healthy foods, quit smoking, lose extra weight, and be more active. You will have to take medicines. Your doctor may suggest a procedure to open narrowed or blocked arteries. This is called angioplasty. Or your doctor may suggest using healthy blood vessels to create detours around narrowed or blocked arteries. This is called bypass surgery. Follow-up care is a key part of your treatment and safety.  Be sure to make and go to all appointments, and call your doctor if you are having problems. It's also a good idea to know your test results and keep a list of the medicines you take. Where can you learn more? Go to [email or web address]://[email or web address].[email or web address] org and sign in to your [employer] account. Enter [phone] in the [employer] box to learn more about Learning About Coronary Artery Disease (CAD).     If you do not have an account, please click on the Sign Up Now link.

## 2023-03-07 NOTE — H&P
Attestation signed by    [facility]    Consult / H&P               Today's Date: 3/7/2023  Patient Name: Branden Jha  Date of admission: No admission date for patient encounter. Patient's age: 75 y. o., 1955  Admission Dx: No admission diagnoses are documented for this encounter. Reason for Consult:  Cardiac evaluation    Requesting Physician: No admitting provider for patient encounter. CHIEF COMPLAINT:  Elective PCI     History Obtained From:  patient    HISTORY OF PRESENT ILLNESS:      The patient is a 75 y.o. female is here for elective PCI   She had cardiac cath done by Dr. Marylou Walker and it showed 70-80% mid LAD stenosis    Past Medical History:   has a past medical history of Allergic rhinitis, Aphthous ulcer of mouth, DDD (degenerative disc disease), cervical, H/O echocardiogram, H/O echocardiogram, H/O echocardiogram, Hyperglycemia, Hyperlipidemia, Hypertension, Mitral regurgitation, MVP (mitral valve prolapse), Obesity, Pulmonary hypertension ([address] S/P cardiac cath, Sleep apnea, Uterine fibroid, Venous insufficiency, and Wears glasses. Past Surgical History:   has a past surgical history that includes Hysterectomy (2009); hysteroscopy; Dilation and curettage of uterus (2008); Hysterectomy (2009); Colonoscopy (08/13/2018); Colonoscopy (N/A, 8/13/2018); Abdomen surgery (10/12/2018); Lithotripsy (Left); and pr lithotripsy xtrcorp shock wave (Left, 12/4/2018). Home Medications:    Prior to Admission medications    Medication Sig Start Date End Date Taking?  Authorizing Provider   amLODIPine (NORVASC) 2.5 MG tablet Take 1 tablet by mouth daily 3/2/23   Gab Baltazar MD   metoprolol succinate (TOPROL XL) 25 MG extended release tablet Take 0.5 tablets by mouth daily 2/28/23   Marta Clancy PA-C   ZINC PO Take by mouth daily    Historical Provider, MD   pantoprazole (PROTONIX) 40 MG tablet Take 1 tablet by mouth every morning (before breakfast) 2/13/23   AIDEE Alarcon - CNP   Cholecalciferol (VITAMIN D) 50 MCG (2000 UT) CAPS capsule Take by mouth    Historical Provider, MD   furosemide (LASIX) 40 MG tablet TAKE 1 TABLET BY MOUTH EVERY DAY 11/8/22   AIDEE Sierra - CNP   atorvastatin (LIPITOR) 20 MG tablet TAKE 1 TABLET BY MOUTH EVERY DAY 9/2/22   AIDEE Sierra - CNP   diclofenac sodium (VOLTAREN) 1 % GEL APPLY 2G TOPICALLY 2 TIMES DAILY 12/7/21   Jenni Belle MD   aspirin 81 MG tablet Take 81 mg by mouth daily    Historical Provider, MD   calcium carbonate (OSCAL) 500 MG TABS tablet Take 500 mg by mouth daily    Historical Provider, MD   Multiple Vitamins-Minerals (ALIVE ONCE DAILY WOMENS 50+ PO) Take by mouth    Historical Provider, MD   Ascorbic Acid (VITAMIN C) 250 MG tablet Take 250 mg by mouth daily    Historical Provider, MD      No current facility-administered medications for this encounter. Allergies:  Seasonal    Social History:   reports that she quit smoking about 22 years ago. Her smoking use included cigarettes. She has a 2.50 pack-year smoking history. She has never used smokeless tobacco. She reports that she does not drink alcohol and does not use drugs. Family History: family history includes Cancer in her mother; Dementia in her mother; Heart Disease in her father; High Blood Pressure in her father; Other in her mother; Stroke in her father. No h/o sudden cardiac death. No for premature CAD    REVIEW OF SYSTEMS:    Constitutional: there has been no unanticipated weight loss. There's been No change in energy level, No change in activity level. Eyes: No visual changes or diplopia. No scleral icterus. ENT: No Headaches  Cardiovascular: No cardiac history  Respiratory: No previous pulmonary problems, No cough  Gastrointestinal: No abdominal pain. No change in bowel or bladder habits. Genitourinary: No dysuria, trouble voiding, or hematuria. Musculoskeletal:  No gait disturbance, No weakness or joint complaints.   Integumentary: No rash or pruritis. Neurological: No headache, diplopia, change in muscle strength, numbness or tingling. No change in gait, balance, coordination, mood, affect, memory, mentation, behavior. Psychiatric: No anxiety, or depression. Endocrine: No temperature intolerance. No excessive thirst, fluid intake, or urination. No tremor. Hematologic/Lymphatic: No abnormal bruising or bleeding, blood clots or swollen lymph nodes. Allergic/Immunologic: No nasal congestion or hives. PHYSICAL EXAM:      LMP  (LMP Unknown)    Constitutional and General Appearance: alert, cooperative, no distress and appears stated age  [de-identified]: PERRL, no cervical lymphadenopathy. No masses palpable. Normal oral mucosa  Respiratory:  Normal excursion and expansion without use of accessory muscles  Resp Auscultation: Good respiratory effort. No for increased work of breathing. On auscultation: clear to auscultation bilaterally  Cardiovascular: The apical impulse is not displaced  Heart tones are crisp and normal. regular S1 and S2.  Jugular venous pulsation Normal  The carotid upstroke is normal in amplitude and contour without delay or bruit  Peripheral pulses are symmetrical and full   Abdomen:   No masses or tenderness  Bowel sounds present  Extremities:   No Cyanosis or Clubbing   Lower extremity edema: No   Skin: Warm and dry  Neurological:  Alert and oriented. Moves all extremities well  No abnormalities of mood, affect, memory, mentation, or behavior are noted    Labs:     CBC: No results for input(s): WBC, HGB, HCT, PLT in the last 72 hours. BMP: No results for input(s): NA, K, CO2, BUN, CREATININE, LABGLOM, GLUCOSE in the last 72 hours. BNP: No results for input(s): BNP in the last 72 hours. PT/INR: No results for input(s): PROTIME, INR in the last 72 hours. APTT:No results for input(s): APTT in the last 72 hours. CARDIAC ENZYMES:No results for input(s): CKTOTAL, CKMB, CKMBINDEX, TROPONINI in the last 72 hours.   FASTING LIPID PANEL:  Lab Results   Component Value Date/Time    HDL 42 02/08/2023 08:42 AM    TRIG 76 02/08/2023 08:42 AM     LIVER PROFILE:No results for input(s): AST, ALT, LABALBU in the last 72 hours. IMPRESSION:    Patient Active Problem List   Diagnosis    Hyperglycemia    Obstructive sleep apnea    Other dyspnea and respiratory abnormality    Essential hypertension    Obesity    Other functional disorder of bladder    Allergic rhinitis    Symptomatic menopausal or female climacteric states    Congenital mitral insufficiency    Sleep apnea    MVP (mitral valve prolapse)    Uterine fibroid    Moderate mitral regurgitation    DDD (degenerative disc disease), cervical    Venous insufficiency    Anxiety    Hyperlipidemia    Hearing loss    Osteopenia    Kidney stone    Eczema    Inflammatory arthritis    Mild aortic stenosis    Osteoarthritis of knee    Aphthous ulcer    COVID-19    Abnormal cardiovascular stress test   CATH 03/23/2023  Coronary Angiography Brief Post Operative Note:     Severe single vessel coronary artery disease involving a mid 70-80% stenosis in the left anterior descending coronary artery. Normal left ventricular end diastolic pressure. Consult to interventional cardiology for consideration of angioplasty and/or stenting of the patients severe stenosis. Assessment     Single vessel CAD (mid 70-80% stenosis in the left anterior descending coronary artery.)  HTN   HLD        RECOMMENDATIONS:  Proceed with PCI   Follow post cath orders. Discussed with patient and Nurse.     Electronically signed by Massimo Calabrese MD on 3/7/2023 at 8:33 [address] [place] [address] [facility]  \

## 2023-03-08 NOTE — PROGRESS NOTES
Dr Lizzy Mckeon at bedside to assess patient. No new orders, ok to discharge. All discharge instructions reviewed, questions answered, paper signed and given copy. Patient discharged per wheelchair with family and belongings.

## 2023-03-10 ENCOUNTER — TELEPHONE (OUTPATIENT)
Dept: CARDIOLOGY | Age: 68
End: 2023-03-10

## 2023-03-10 NOTE — TELEPHONE ENCOUNTER
----- Message from Yanely Salinas MD sent at 3/10/2023 12:00 AM EST -----  Let Ms. Wing Murdockau know their test result was ok. Will discuss at next visit. Thanks.

## 2023-03-20 ENCOUNTER — OFFICE VISIT (OUTPATIENT)
Dept: PRIMARY CARE CLINIC | Age: 68
End: 2023-03-20
Payer: MEDICARE

## 2023-03-20 VITALS
TEMPERATURE: 97.8 F | BODY MASS INDEX: 33.63 KG/M2 | HEIGHT: 64 IN | DIASTOLIC BLOOD PRESSURE: 84 MMHG | HEART RATE: 92 BPM | OXYGEN SATURATION: 100 % | WEIGHT: 197 LBS | SYSTOLIC BLOOD PRESSURE: 130 MMHG

## 2023-03-20 DIAGNOSIS — J02.0 STREP PHARYNGITIS: Primary | ICD-10-CM

## 2023-03-20 DIAGNOSIS — H92.02 LEFT EAR PAIN: ICD-10-CM

## 2023-03-20 LAB — S PYO AG THROAT QL: POSITIVE

## 2023-03-20 PROCEDURE — 87880 STREP A ASSAY W/OPTIC: CPT | Performed by: STUDENT IN AN ORGANIZED HEALTH CARE EDUCATION/TRAINING PROGRAM

## 2023-03-20 PROCEDURE — 3079F DIAST BP 80-89 MM HG: CPT | Performed by: STUDENT IN AN ORGANIZED HEALTH CARE EDUCATION/TRAINING PROGRAM

## 2023-03-20 PROCEDURE — 1123F ACP DISCUSS/DSCN MKR DOCD: CPT | Performed by: STUDENT IN AN ORGANIZED HEALTH CARE EDUCATION/TRAINING PROGRAM

## 2023-03-20 PROCEDURE — 99213 OFFICE O/P EST LOW 20 MIN: CPT | Performed by: STUDENT IN AN ORGANIZED HEALTH CARE EDUCATION/TRAINING PROGRAM

## 2023-03-20 PROCEDURE — 3075F SYST BP GE 130 - 139MM HG: CPT | Performed by: STUDENT IN AN ORGANIZED HEALTH CARE EDUCATION/TRAINING PROGRAM

## 2023-03-20 RX ORDER — AMOXICILLIN AND CLAVULANATE POTASSIUM 875; 125 MG/1; MG/1
1 TABLET, FILM COATED ORAL 2 TIMES DAILY
Qty: 28 TABLET | Refills: 0 | Status: SHIPPED | OUTPATIENT
Start: 2023-03-20 | End: 2023-04-03

## 2023-03-20 NOTE — PROGRESS NOTES
has no wheezes. Abdominal: Soft. Bowel sounds are normal. Patient exhibits no distension. There is no tenderness. Musculoskeletal:  Patient exhibits no edema and tenderness. Patient exhibits no deformity. Neurological: Patient is alert and oriented to person, place, and time. Skin: Skin is warm and dry. Patient is not diaphoretic. Psychiatric: Patient's speech is normal and behavior is normal. Thought content normal.   Vitals reviewed. Lab Results   Component Value Date    WBC 7.7 02/28/2023    HGB 14.2 02/28/2023    HCT 43.9 02/28/2023     02/28/2023    CHOL 121 02/08/2023    TRIG 76 02/08/2023    HDL 42 02/08/2023    ALT 28 02/08/2023    AST 21 02/08/2023     (H) 02/28/2023    K 5.3 02/28/2023     (H) 02/28/2023    CREATININE 0.78 02/28/2023    BUN 13 02/28/2023    CO2 30 02/28/2023    TSH 2.20 07/15/2015    LABA1C 5.8 02/08/2023    LABMICR 6 08/27/2014     Lab Results   Component Value Date    CALCIUM 9.7 02/28/2023     Lab Results   Component Value Date    LDLCHOLESTEROL 64 02/08/2023       Please note that this chart was generated using voice recognition Dragon dictation software. Although every effort was made to ensure the accuracy of this automated transcription, some errors in transcription may have occurred.     Electronically signed by Dr. Bell Amador MD on 3/20/2023 at 10:37 AM

## 2023-03-22 ENCOUNTER — OFFICE VISIT (OUTPATIENT)
Dept: CARDIOLOGY | Age: 68
End: 2023-03-22

## 2023-03-22 VITALS
DIASTOLIC BLOOD PRESSURE: 78 MMHG | HEART RATE: 74 BPM | OXYGEN SATURATION: 98 % | RESPIRATION RATE: 18 BRPM | SYSTOLIC BLOOD PRESSURE: 132 MMHG | HEIGHT: 64 IN | WEIGHT: 197 LBS | BODY MASS INDEX: 33.63 KG/M2

## 2023-03-22 DIAGNOSIS — I25.10 ASHD (ARTERIOSCLEROTIC HEART DISEASE): ICD-10-CM

## 2023-03-22 DIAGNOSIS — R06.02 SOB (SHORTNESS OF BREATH): ICD-10-CM

## 2023-03-22 DIAGNOSIS — I10 PRIMARY HYPERTENSION: ICD-10-CM

## 2023-03-22 DIAGNOSIS — Z01.810 PREOP CARDIOVASCULAR EXAM: Primary | ICD-10-CM

## 2023-03-22 DIAGNOSIS — I35.0 NONRHEUMATIC AORTIC (VALVE) STENOSIS: ICD-10-CM

## 2023-03-22 DIAGNOSIS — Z98.890 S/P CARDIAC CATH: ICD-10-CM

## 2023-03-22 DIAGNOSIS — I34.0 NONRHEUMATIC MITRAL VALVE REGURGITATION: ICD-10-CM

## 2023-03-22 DIAGNOSIS — Z95.820 S/P ANGIOPLASTY WITH STENT: ICD-10-CM

## 2023-03-22 DIAGNOSIS — E66.9 CLASS 1 OBESITY WITH BODY MASS INDEX (BMI) OF 33.0 TO 33.9 IN ADULT, UNSPECIFIED OBESITY TYPE, UNSPECIFIED WHETHER SERIOUS COMORBIDITY PRESENT: ICD-10-CM

## 2023-03-22 RX ORDER — ATORVASTATIN CALCIUM 40 MG/1
40 TABLET, FILM COATED ORAL DAILY
Qty: 90 TABLET | Refills: 3 | Status: SHIPPED | OUTPATIENT
Start: 2023-03-22

## 2023-03-22 RX ORDER — CLOPIDOGREL BISULFATE 75 MG/1
75 TABLET ORAL DAILY
Qty: 90 TABLET | Refills: 3 | Status: SHIPPED | OUTPATIENT
Start: 2023-03-22

## 2023-03-22 NOTE — PROGRESS NOTES
MG tablet    [facility]    atorvastatin (LIPITOR) 40 MG tablet      8. SOB (shortness of breath)  clopidogrel (PLAVIX) 75 MG tablet    [facility]    atorvastatin (LIPITOR) 40 MG tablet      9. Class 1 obesity with body mass index (BMI) of 33.0 to 33.9 in adult, unspecified obesity type, unspecified whether serious comorbidity present  clopidogrel (PLAVIX) 75 MG tablet    [facility]    atorvastatin (LIPITOR) 40 MG tablet          Plan:   Pre-Op Clearance: Kidney stone surgery with Dr. You Perez on 4/18/2023 at [facility].   We discussed how we didn't want to stop this Plavix to at least 3 month for urgent procedures and up to 6 months for elective procedures. She reports she was going to be okay with waiting the 6 months to make sure she can reduce the risk of problems with her stent from stopping her plavix. Coronary Artery Disease: S/P Cardiac Cath - S/P Stent on 3/7/2023  History of Abnormal Stress Test  Risk factors: smoking/ tobacco exposure, family history, dyslipidemia, obesity, sedentary life style, hypertension, post-menopausal   Antiplatelet Agent: Continue Aspirin 81 mg daily. Antiplatelet Agent: STOP {Ticagrelor (Brilinta) and START clopidogrel (Plavix): Plavix 75 mg daily. I also reminded them to watch for signs of bloody or black tarry stools and stop the medication immediately if this develops as this could be life threatening. Beta Blocker: STOP Metoprolol succinate (Toprol XL)   Anti-anginal medications: Not indicated at this time. Cholesterol Reduction Therapy: INCREASE to Atorvastatin (Lipitor) 40 mg daily. I discussed the potential benefits of statin therapy as well as the potential risks including myalgia as well as the rare but potentially serious complication of liver or kidney damage.  Although rare, I told them that this could be serious and therefore told them to stop the medication immediately and call if they developed

## 2023-03-22 NOTE — PATIENT INSTRUCTIONS
SURVEY:    You may be receiving a survey from [employer] regarding your visit today. Please complete the survey to enable us to provide the highest quality of care to you and your family. If you cannot score us a very good on any question, please call the office to discuss how we could have made your experience a very good one. Thank you.

## 2023-03-23 ENCOUNTER — TELEPHONE (OUTPATIENT)
Dept: UROLOGY | Age: 68
End: 2023-03-23

## 2023-03-23 DIAGNOSIS — N20.0 KIDNEY STONES: Primary | ICD-10-CM

## 2023-03-23 NOTE — TELEPHONE ENCOUNTER
Since patient was scheduled for surgery she had a stent put in. She said that Dr. Deyvi Varela said no surgery  for 6 month due to blood thinners. Patient is currently scheduled for surgery with us on 4/18/23.

## 2023-03-24 ENCOUNTER — HOSPITAL ENCOUNTER (OUTPATIENT)
Dept: CARDIAC REHAB | Age: 68
Setting detail: THERAPIES SERIES
Discharge: HOME | End: 2023-03-24

## 2023-03-24 NOTE — PROGRESS NOTES
Phase II Cardiac Rehabilitation   Physician Order Nicole Henry  1955  [id number]  3/24/2023    Procedure/Code:  Phase 2 Cardiac Rehabilitation/96811  Diagnosis/Code:  Z95.5  Onset/Procudure Date: 03/07/2023    Prescribed Exercise Plan:  Submaximal exercise evaluation at program beginning and completion  Target HR: 126-139    Initial MET Level: 2-4 METs     Duration: 31 - 60 Minutes  Frequency: 3 Days per week  Modalities:  Treadmill   Bicycle ergometer/UBE  Seated Stepper  Rowing Machine  Weights/bands  May increase duration per F.I.T.T. protocol parameters on average 5-10 minutes every 1-2 weeks for the first 4-6 weeks. After 3-4 weeks completed, continue to gradually increase F.I.T.T. parameters gradually at the established duration on average 0.5-1.0 METs per 30 days over the course of the remaining program, as established by patient centered goals and guidelines, maintaining RPE 12-16. Introduce 8-15 bilateral upper /lower extremity resistance exercise at 1-3 sets per lift, on 2-3 non-consecutive days using TheraBand/weights to 8-15 reps on at lease 2 occasions, maintaining RPE 12-16. Once repetition maximum has been achieved, additional weight sets may be added. Standing Orders:  Initiate ACLS for cardiac events  Nitroglycerine 0.4mg SL every 5 minutes X 3 for angina pain  12 lead EKG for chest pain or dysrhythmia  O2 per nasal cannula as needed for SpO2 <90% with symptoms  Blood sugar monitoring for hyper/hypoglycemia symptoms  Lipid panel pre/post program as needed.
Physician Referral     Medications:  N/A    Education:    [] Stress Management   [] Depression    Target Goal:  -Assess presence or absence of depression using a valid screening tool. -Maximize coping skills.  -Positive support system. Preventative Medication:   [x] Aspirin       [] Beta Blockade      [x] Statin or other lipid lowering agent     [x] Antiplatelet   [x] ACE Inhibitor/ARB/Calcium Channel Blocker   [] Anticoagulant   Medication Compliance (stated):    [x] 100%  [] 75%           [] 50%  [] 25%      [] 0%         Target Goal:  -100% medication adherence    Fall Risk Assessment:  History of falls with or without injury  [] Yes   [x] No   Use of ambulatory aid  [] Yes  [x] No   Difficulty walking/impaired gait  [] Yes  [x] No   Numbness in feet  [] Yes   [x] No   Vision changes  [] Yes  [x] No   Dizziness  [] Yes  [x] No   Shortness of breath  [x] Yes  [] No   Medications  [x] Anticoagulant/Antiplatelet  [x] Betablocker /ACE/ARB/Calcium channel blocker  [] Antidepressant  [] Seizure medication   [] NA  Risk of fall  [] Low (none of above)  [x] Medium (less than 3 above)  [] High (3 or more above)    Patient 90-Day Goals: (Specific, Measurable, Achievable, Relevant, and Time-Bound)  \"Get heart stronger, becomes less short of breath, and lose some weight. \"    Program Goals:   Achieve and progress prescribed exercise frequency, intensity, time, and type based upon initial evaluation and submaximal graded exercise results as evidenced by the attaining and maintaining the prescribed target heart rate range, a Juan Diego rating of perceived exertion between 11 and 16, duration of 31 - 60 minutes using multiple exercise modes for at least 3 days/week, progressing at least 0.5-1.0 METs/week as tolerated, as evidenced by daily session reports and pre/post MET levels.      Introduce and progress 8-10 different bilateral UE and/or LE progressive resistance exercises focused on major muscle groups using 1-3 sets each per
regular cardiovascular exercise program, as evidenced by routine BP monitoring. Minimize self-reported psycho-social feelings of stress over the program by educating patient, monitoring medication compliance, introducing and maintaining regular cardiovascular exercise as evidenced by pre- and post- surveys and by routine rounding with patient. Demonstrate knowledge about risk factor reduction, lifestyle modification, and heart health strategies with a score of >=75% via education classes and counseling as evidenced by pre- and post-program education assessment screening tool. Complete abstinence from tobacco products over the cardiac rehab program through intensive counseling, behavior modification, and medication compliance as evidenced by routine rounding with patient.     Electronically signed by PIPPA Crowell on 3/24/23 at 8:08 AM EDT

## 2023-03-27 ENCOUNTER — HOSPITAL ENCOUNTER (OUTPATIENT)
Dept: CARDIAC REHAB | Age: 68
Discharge: HOME | End: 2023-03-27

## 2023-03-27 PROCEDURE — 93798 PHYS/QHP OP CAR RHAB W/ECG: CPT

## 2023-03-29 ENCOUNTER — HOSPITAL ENCOUNTER (OUTPATIENT)
Dept: CARDIAC REHAB | Age: 68
Discharge: HOME | End: 2023-03-29

## 2023-03-29 PROCEDURE — 93798 PHYS/QHP OP CAR RHAB W/ECG: CPT

## 2023-03-30 ENCOUNTER — HOSPITAL ENCOUNTER (OUTPATIENT)
Dept: CARDIAC REHAB | Age: 68
Discharge: HOME | End: 2023-03-30

## 2023-03-30 PROCEDURE — 93798 PHYS/QHP OP CAR RHAB W/ECG: CPT

## 2023-04-03 ENCOUNTER — HOSPITAL ENCOUNTER (OUTPATIENT)
Dept: CARDIAC REHAB | Age: 68
Discharge: HOME | End: 2023-04-03
Payer: MEDICARE

## 2023-04-03 PROCEDURE — 93798 PHYS/QHP OP CAR RHAB W/ECG: CPT

## 2023-04-05 ENCOUNTER — HOSPITAL ENCOUNTER (OUTPATIENT)
Dept: CARDIAC REHAB | Age: 68
Discharge: HOME | End: 2023-04-05
Payer: MEDICARE

## 2023-04-05 PROCEDURE — 93798 PHYS/QHP OP CAR RHAB W/ECG: CPT

## 2023-04-06 ENCOUNTER — HOSPITAL ENCOUNTER (OUTPATIENT)
Dept: CARDIAC REHAB | Age: 68
Discharge: HOME | End: 2023-04-06
Payer: MEDICARE

## 2023-04-06 PROCEDURE — 93798 PHYS/QHP OP CAR RHAB W/ECG: CPT

## 2023-04-17 ENCOUNTER — HOSPITAL ENCOUNTER (OUTPATIENT)
Dept: CARDIAC REHAB | Age: 68
Discharge: HOME | End: 2023-04-17
Payer: MEDICARE

## 2023-04-17 PROCEDURE — 93798 PHYS/QHP OP CAR RHAB W/ECG: CPT

## 2023-04-19 ENCOUNTER — HOSPITAL ENCOUNTER (OUTPATIENT)
Dept: CARDIAC REHAB | Age: 68
Discharge: HOME | End: 2023-04-19
Payer: MEDICARE

## 2023-04-19 PROCEDURE — 93798 PHYS/QHP OP CAR RHAB W/ECG: CPT

## 2023-04-19 NOTE — PROGRESS NOTES
Cardiopulmonary Rehab   Medical Nutrition Therapy Food Diary Evaluation    Patient Name: Sharon Ledezma  Registered Dietitian:  Laura Osler, RD, LD, RDN, LD  Date:  4/19/2023    Dear Zoë Calloway,    Thank you for sharing your information about your eating patterns, it serves not only to help me see what you are eating, but you as well. Eating 3 meals a day is great way to start, I am pleased to see that you are doing that. Whole grains, fruits and vegetables, along with lean meats and low fat dairy are the cornerstones of your health. It was good to see wheat bread and mandarin oranges on your food diary. With that in mind, look below for some suggestions. Your REAPS Score was: 26, which means: there are some ways you can make your eating habits healthier    Recommendations from the Dietitian based on your RYP and Food Diary / activity record to improve health and decrease risk factors    Limit sodium to less than 2,000 mg every day (added salt and hidden sodium combined). Include a minimum of 2 servings of non starchy vegetables such as broccoli, cauliflower, green beans, carrots, etc. every day (1/2 cup cooked, 1 cup raw). Add 3 servings of fruit every day (fresh, frozen, or canned in lite syrup or own juice). Drink 56 oz water every day. Include sources of whole grains in daily diet (whole wheat bread, buns, English muffins, brown rice, whole wheat pasta, etc.). 6.  Limit cheese to 1 oz serving size, choose white cheeses for lower sodium, lower fat option such as mozzarella, provolone, etc.   7.  Avoid foods high in fat such as guzman, hot dogs, pepperoni, sausage, fried foods, desserts, etc.   8.  Include 150 minutes of physical activity weekly. 9. Eat 3 well balanced meals with a variety of lean meats, fresh fruits, vegetables, whole grains and low fat dairy. 10. Limit added sugars to less than 25 grams each day. 11. Choose unsweetened beverages such as water, tea, etc. For fluids.    12.

## 2023-04-20 ENCOUNTER — HOSPITAL ENCOUNTER (OUTPATIENT)
Dept: CARDIAC REHAB | Age: 68
Discharge: HOME | End: 2023-04-20
Payer: MEDICARE

## 2023-04-20 PROCEDURE — 93798 PHYS/QHP OP CAR RHAB W/ECG: CPT

## 2023-04-21 NOTE — PROGRESS NOTES
Phase II Cardiac Rehab Individualized Treatment Plan - 30 Day    Patient Name: Kaitlyn Galvan  Date: 4/21/2023  ACCOUNT #: [de-identified]  Diagnosis: PCI   Onset Date: 3/7/2023  Referring Physician: Dr. Tamra Hernandez  Session Number: 15  EXERCISE    Stages of Change:   [] pre-contemplation  [x] Action   [] Contemplate    [] Maintenance   [] Prep   [] Relapse          Exercise Prescription:  Mode: [x] TM [x] B [x] STP  [x] R   [x] UBE     Frequency: 3 days per week  Duration: 31-60 minutes  Intensity: 3.5-4.0 METs            THRR: 126-139 bpm  Progression:   Based on risk stratification, may increase duration per F.I.T.T. protocol parameters on average 5-10 minutes every 1-2 weeks for the first 4-6 weeks. After 3-4 weeks completed, continue to gradually increase F.I.T.T. parameters gradually at the established duration on average 0.5-1.0 METs per 30 days over the course of the remaining program, as established by patient centered goals and guidelines, maintaining RPE 12-16. [x] Resistance Training  Introduce 8-15 bilateral upper extremity resistance exercise at 1-3 sets per lift, on 2-3 non-consecutive days using TheraBand/Weights to 8-15 reps on at lease 2 occasions, maintaining RPE 12-16. Once repetition maximum has been achieved, additional weight sets may be added. Hypertension:  [x] Yes  [] No  Resting BP: 126/62  Peak Exercise BP: 152/66  BP Meds: Norvasc    Intervention:  Home Exercise:  Current Exercise -    [x] Yes - walks 15 min on off-rehab days   [] No   [] Resistance Training  Progression:  20-60 minutes of aerobic exercise on at lease 2 non-rehab days. 8-12 bilateral upper extremity resistance exercise at 1-3 sets per lift, on 2 non-consecutive days using TheraBand/Free Weights/Weight Machine to 8-15 reps on at lease 2 occasions. Once repetition maximum has been achieved, additional weight sets may be added.     Education:   [x]  Equipment [place]  [] Understanding BP   [x] S/S to report  [] Sodium

## 2023-04-24 ENCOUNTER — HOSPITAL ENCOUNTER (OUTPATIENT)
Dept: CARDIAC REHAB | Age: 68
Discharge: HOME | End: 2023-04-24
Payer: MEDICARE

## 2023-04-24 PROCEDURE — 93798 PHYS/QHP OP CAR RHAB W/ECG: CPT

## 2023-04-26 ENCOUNTER — HOSPITAL ENCOUNTER (OUTPATIENT)
Dept: CARDIAC REHAB | Age: 68
Discharge: HOME | End: 2023-04-26
Payer: MEDICARE

## 2023-04-26 PROCEDURE — 93798 PHYS/QHP OP CAR RHAB W/ECG: CPT

## 2023-04-27 ENCOUNTER — HOSPITAL ENCOUNTER (OUTPATIENT)
Dept: CARDIAC REHAB | Age: 68
Discharge: HOME | End: 2023-04-27
Payer: MEDICARE

## 2023-04-27 PROCEDURE — 93798 PHYS/QHP OP CAR RHAB W/ECG: CPT

## 2023-05-01 ENCOUNTER — HOSPITAL ENCOUNTER (OUTPATIENT)
Dept: CARDIAC REHAB | Age: 68
Discharge: HOME | End: 2023-05-01
Payer: MEDICARE

## 2023-05-01 PROCEDURE — 93798 PHYS/QHP OP CAR RHAB W/ECG: CPT

## 2023-05-03 ENCOUNTER — OFFICE VISIT (OUTPATIENT)
Dept: CARDIOLOGY | Age: 68
End: 2023-05-03

## 2023-05-03 ENCOUNTER — HOSPITAL ENCOUNTER (OUTPATIENT)
Dept: CARDIAC REHAB | Age: 68
Discharge: HOME | End: 2023-05-03
Payer: MEDICARE

## 2023-05-03 ENCOUNTER — TELEPHONE (OUTPATIENT)
Dept: CARDIOLOGY | Age: 68
End: 2023-05-03

## 2023-05-03 ENCOUNTER — HOSPITAL ENCOUNTER (OUTPATIENT)
Age: 68
Discharge: HOME | End: 2023-05-03
Payer: MEDICARE

## 2023-05-03 VITALS
DIASTOLIC BLOOD PRESSURE: 71 MMHG | WEIGHT: 195.4 LBS | SYSTOLIC BLOOD PRESSURE: 135 MMHG | HEART RATE: 72 BPM | HEIGHT: 64 IN | OXYGEN SATURATION: 96 % | RESPIRATION RATE: 18 BRPM | BODY MASS INDEX: 33.36 KG/M2

## 2023-05-03 DIAGNOSIS — I25.10 ASHD (ARTERIOSCLEROTIC HEART DISEASE): ICD-10-CM

## 2023-05-03 DIAGNOSIS — R00.2 PALPITATION: ICD-10-CM

## 2023-05-03 DIAGNOSIS — I35.0 NONRHEUMATIC AORTIC (VALVE) STENOSIS: ICD-10-CM

## 2023-05-03 DIAGNOSIS — I25.10 ASHD (ARTERIOSCLEROTIC HEART DISEASE): Primary | ICD-10-CM

## 2023-05-03 DIAGNOSIS — R06.02 SOB (SHORTNESS OF BREATH): ICD-10-CM

## 2023-05-03 DIAGNOSIS — I34.0 NONRHEUMATIC MITRAL VALVE REGURGITATION: ICD-10-CM

## 2023-05-03 DIAGNOSIS — Z95.820 S/P ANGIOPLASTY WITH STENT: ICD-10-CM

## 2023-05-03 DIAGNOSIS — I10 PRIMARY HYPERTENSION: ICD-10-CM

## 2023-05-03 DIAGNOSIS — E66.9 CLASS 1 OBESITY WITH BODY MASS INDEX (BMI) OF 33.0 TO 33.9 IN ADULT, UNSPECIFIED OBESITY TYPE, UNSPECIFIED WHETHER SERIOUS COMORBIDITY PRESENT: ICD-10-CM

## 2023-05-03 LAB — TSH SERPL-ACNC: 3.17 UIU/ML (ref 0.3–5)

## 2023-05-03 PROCEDURE — 36415 COLL VENOUS BLD VENIPUNCTURE: CPT

## 2023-05-03 PROCEDURE — 93798 PHYS/QHP OP CAR RHAB W/ECG: CPT

## 2023-05-03 PROCEDURE — 84443 ASSAY THYROID STIM HORMONE: CPT

## 2023-05-03 NOTE — TELEPHONE ENCOUNTER
----- Message from Gisel Mai PA-C sent at 5/3/2023  1:25 PM EDT -----  Please notify patient that their lab results are normal.   Please continue current treatment and follow up.

## 2023-05-03 NOTE — PATIENT INSTRUCTIONS
SURVEY:    You may be receiving a survey from [employer] regarding your visit today. Please complete the survey to enable us to provide the highest quality of care to you and your family. If you cannot score us a very good on any question, please call the office to discuss how we could have made your experience a very good one. Thank you.

## 2023-05-03 NOTE — PROGRESS NOTES
I, Becky Hardy am scribing for and in the presence of Chantale Redman PA-C. Subjective:     CHIEF COMPLAINT / HPI:    Chief Complaint   Patient presents with    Follow-up     HX:ASHD, s/p stent, mitral valve regurg PT is here for 6 week follow up she states she is feeling better still has some fatigue, sob with exertion. Denies:CP, lightheaded/dizziness,palp      Dear Dr. Reese Hanna, APRN - CNP,    I had the pleasure of seeing Grant Stephens in consultation today. Anand Caruso is 75 y.o. female with past medical history of hyperlipidemia, hypertension and mitral regurgitation. No history of coronary artery disease, myocardial infarction, heart failure or significant arrhythmia. Her father had heart disease starting at 54years old. She is former smoker, 1 ppk a week. Negative stress Echo in 2015. Her risk factors for coronary artery disease includes hypertension, hyperlipidemia and family history of premature coronary artery disease. ECG done in office 9/17/2019- Normal sinus rhythm, normal ECG. Echo done on 8/6/2018- EF >55%. The LV wall thickness is mildly incrased. Moderate mitral regurg. Mild aortic stenosis. Mild aortic regurg. Mild tricuspid regurg. Evidence of mild (grade I) diastolic dysfucntion is seen. Echo done on 2/22/23: Global left ventricular systolic function appears preserved with an estimated ejection fraction of 60%. The left ventricular cavity size is within normal limits and the left ventricular wall thickness is mildly increased. Aortic leaflet calcification with mild aortic stenosis with a mean gradient of 14 mmHg. Mild aortic regurgitation was seen. Mild to moderate posteriorly directed mitral regurgitation was seen. Mild pulmonary hypertension with an estimated right ventricular systolic pressure of 37 mmHg. Mild to moderate tricuspid regurgitation. Evidence of moderate diastolic dysfunction is seen.     Stress test done on 2/22/23:  Abnormal myocardial perfusion

## 2023-05-04 ENCOUNTER — HOSPITAL ENCOUNTER (OUTPATIENT)
Dept: NUTRITION | Age: 68
Discharge: HOME | End: 2023-05-04

## 2023-05-04 ENCOUNTER — HOSPITAL ENCOUNTER (OUTPATIENT)
Dept: CARDIAC REHAB | Age: 68
Discharge: HOME | End: 2023-05-04
Payer: MEDICARE

## 2023-05-04 PROCEDURE — 93798 PHYS/QHP OP CAR RHAB W/ECG: CPT

## 2023-05-04 NOTE — PROGRESS NOTES
MEDICAL NUTRITION THERAPY - CARDIOPULMONARY 1:1 VISIT      Food diary:  Reveals Misty Moss is eating 3 meals per day, states if she doesn't eat breakfast within 1 hour of rising, she gets shaky sometimes. Does not have a glucometer to check her blood sugar. A1c 5.8/. Pt encouraged to consume meals at consistent times with consistent carbohydrates and have protein with meal and snack. She enjoys vegetables and eats red peppers, cucumbers, raw carrots, and celery with peanut butter. She has stopped snacking on cookies and is eating vegetables instead. Other snacks include pretzels, sweet and salty granola bars, almonds, apple with peanut butter, or tangerines. She does prepare meals at home and avoids packaged foods. She often has a salad at lunch and has raspberry vinaigrette or [place] dressing on her salad, does not use much dressing. Misty Moss encouraged to have adequate amount of carbohydrate with salad, add fruit, whole grain crackers, yogurt. She is having wheat toast at breakfast with a yoplait yogurt. Misty Moss is more active      Recommendations include: Increase whole grains (choose instead of refined wheat products)      Increase whole fruit and vegetable use (at least 5 a day combined)      Eat, instead of drinking fruit items.        Incorporate daily activity (>30 minutes)      Read food labels for Total Fat (< 45 grams daily), Saturated (<9 grams daily) and Trans fats (zero daily)      Read food labels for Sodium content (goal is <2000 mg daily)      Measure food portions and use MyPlate as guide for meal variety      Maintain consistent carbohydrate intakes at meals and snacks (45 grams at meals and 15 grams for a night snack)      Drink 48 oz water daily                                                 Include 150 minutes of physical activity daily    Provided literature on Heart Healthy Eating     Electronically signed by Timmy Hunt RD, LD on 5/4/2023 at 12:55 PM

## 2023-05-08 ENCOUNTER — HOSPITAL ENCOUNTER (OUTPATIENT)
Dept: CARDIAC REHAB | Age: 68
Discharge: HOME | End: 2023-05-08

## 2023-05-08 PROCEDURE — 93798 PHYS/QHP OP CAR RHAB W/ECG: CPT

## 2023-05-10 ENCOUNTER — HOSPITAL ENCOUNTER (OUTPATIENT)
Dept: CARDIAC REHAB | Age: 68
Discharge: HOME | End: 2023-05-10

## 2023-05-10 PROCEDURE — 93798 PHYS/QHP OP CAR RHAB W/ECG: CPT

## 2023-05-11 ENCOUNTER — HOSPITAL ENCOUNTER (OUTPATIENT)
Dept: CARDIAC REHAB | Age: 68
Discharge: HOME | End: 2023-05-11

## 2023-05-11 PROCEDURE — 93798 PHYS/QHP OP CAR RHAB W/ECG: CPT

## 2023-05-15 ENCOUNTER — HOSPITAL ENCOUNTER (OUTPATIENT)
Dept: CARDIAC REHAB | Age: 68
Discharge: HOME | End: 2023-05-15
Payer: MEDICARE

## 2023-05-15 PROCEDURE — 93798 PHYS/QHP OP CAR RHAB W/ECG: CPT

## 2023-05-17 ENCOUNTER — HOSPITAL ENCOUNTER (OUTPATIENT)
Age: 68
Discharge: HOME | End: 2023-05-17
Payer: MEDICARE

## 2023-05-17 ENCOUNTER — OFFICE VISIT (OUTPATIENT)
Dept: PRIMARY CARE CLINIC | Age: 68
End: 2023-05-17
Payer: MEDICARE

## 2023-05-17 ENCOUNTER — HOSPITAL ENCOUNTER (OUTPATIENT)
Dept: CARDIAC REHAB | Age: 68
Discharge: HOME | End: 2023-05-17
Payer: MEDICARE

## 2023-05-17 VITALS
HEIGHT: 64 IN | WEIGHT: 197 LBS | OXYGEN SATURATION: 98 % | RESPIRATION RATE: 18 BRPM | HEART RATE: 96 BPM | DIASTOLIC BLOOD PRESSURE: 80 MMHG | SYSTOLIC BLOOD PRESSURE: 126 MMHG | TEMPERATURE: 97.5 F | BODY MASS INDEX: 33.63 KG/M2

## 2023-05-17 DIAGNOSIS — R73.03 PREDIABETES: ICD-10-CM

## 2023-05-17 DIAGNOSIS — E78.5 HYPERLIPIDEMIA, UNSPECIFIED HYPERLIPIDEMIA TYPE: ICD-10-CM

## 2023-05-17 DIAGNOSIS — R73.03 PREDIABETES: Primary | ICD-10-CM

## 2023-05-17 DIAGNOSIS — I10 ESSENTIAL HYPERTENSION: ICD-10-CM

## 2023-05-17 LAB
EST. AVERAGE GLUCOSE BLD GHB EST-MCNC: 114 MG/DL
HBA1C MFR BLD: 5.6 % (ref 4–6)

## 2023-05-17 PROCEDURE — 99214 OFFICE O/P EST MOD 30 MIN: CPT | Performed by: NURSE PRACTITIONER

## 2023-05-17 PROCEDURE — 3074F SYST BP LT 130 MM HG: CPT | Performed by: NURSE PRACTITIONER

## 2023-05-17 PROCEDURE — 3079F DIAST BP 80-89 MM HG: CPT | Performed by: NURSE PRACTITIONER

## 2023-05-17 PROCEDURE — 1123F ACP DISCUSS/DSCN MKR DOCD: CPT | Performed by: NURSE PRACTITIONER

## 2023-05-17 PROCEDURE — 93798 PHYS/QHP OP CAR RHAB W/ECG: CPT

## 2023-05-17 PROCEDURE — 83036 HEMOGLOBIN GLYCOSYLATED A1C: CPT

## 2023-05-17 PROCEDURE — 36415 COLL VENOUS BLD VENIPUNCTURE: CPT

## 2023-05-17 ASSESSMENT — ENCOUNTER SYMPTOMS: SHORTNESS OF BREATH: 0

## 2023-05-17 NOTE — PROGRESS NOTES
or dizziness. Hyperlipidemia:  Current medication regimen includes atorvastatin 40mg QD. She denies side effects with this medication. She is following a well balanced diet. She is following a low fat, low cholesterol diet. For exercise, she is completing cardiac rehab and walking. Cardiac:  Following with [facility] Cardiology. Continuing with cardiac rehab three times per week. Denies SOB or chest pain. Past Medical History:     Past Medical History:   Diagnosis Date    Allergic rhinitis 1992    Aphthous ulcer of mouth     CAD (coronary artery disease)     H/O echocardiogram 08/11/2015    EF 65%. Aortic valve sclerosis without stenosis. Mild aortic regurgitation. Myxomatous thickening of the mitral leaflets with Moderate mitral regurgitation. Mils (grade l) diastolic dysfunction. H/O echocardiogram 09/02/2016    EF >60%. Normal LV wall thickness and cavity size. No definite specific wall motion abnormalities were identified. Left atrium is mildly dilated (29-33) left atrial volume index of 31 ml/m2. Mild aortic stenosis. Myxomatous thickening of the mitral leaflets with moderate eccentric mitral regurg. Mild treicuspid regurg. Mild diastolic dysfunction is seen. H/O echocardiogram 08/06/2018    EF >55%. The LV wall thickness is mildly incrased. Moderate mitral regurg. Mild aortic stenosis. Mild aortic regurg. Mild tricuspid regurg. Evidence of mild (grade I) diastolic dysfucntion is seen.      Hyperglycemia 2013    Hyperlipidemia     Hypertension     Mitral regurgitation 2010    MVP (mitral valve prolapse) 2010    Obesity 2010    Pulmonary hypertension ([address] 2013    S/P cardiac cath 03/02/2023    [facility]) [place]/Dr. Jefferson/Right Radial    Sleep apnea 2012    Uterine fibroid 2008    Venous insufficiency 2013    Wears glasses       Reviewed all health maintenance requirements and ordered appropriate tests  Health Maintenance Due   Topic Date Due    Hepatitis C screen  Never done

## 2023-05-17 NOTE — PATIENT INSTRUCTIONS
SURVEY:    You may be receiving a survey from [employer] regarding your visit today. Please complete the survey to enable us to provide the highest quality of care to you and your family. If you cannot score us a very good on any question, please call the office to discuss how we could of made your experience a very good one. Thank you.

## 2023-05-18 ENCOUNTER — HOSPITAL ENCOUNTER (OUTPATIENT)
Dept: CARDIAC REHAB | Age: 68
Discharge: HOME | End: 2023-05-18
Payer: MEDICARE

## 2023-05-18 PROCEDURE — 93798 PHYS/QHP OP CAR RHAB W/ECG: CPT

## 2023-05-19 NOTE — PROGRESS NOTES
Phase II Cardiac Rehab Individualized Treatment Plan - 61 Day    Patient Name: Quin Diaz  Date: 5/19/2023  ACCOUNT #: [de-identified]  Diagnosis: PCI w/ AMARILIS   Onset Date: 3/7/23  Referring Physician: Dr. Osmin Lazaro  Session Number: 25   EXERCISE    Stages of Change:   [] pre-contemplation  [x] Action   [] Contemplate    [] Maintenance   [] Prep   [] Relapse          Exercise Prescription:  Mode: [x] TM [x] B [x] STP  [x] R   [x] UBE     Frequency: 3 days per week  Duration: 31-60 minutes  Intensity: 3.8-4.8 METs             THRR:  126-139  Progression:   Based on risk stratification, may increase duration per F.I.T.T. protocol parameters on average 5-10 minutes every 1-2 weeks for the first 4-6 weeks. After 3-4 weeks completed, continue to gradually increase F.I.T.T. parameters gradually at the established duration on average 0.5-1.0 METs per 30 days over the course of the remaining program, as established by patient centered goals and guidelines, maintaining RPE 12-16. [] Resistance Training  Introduce 8-15 bilateral upper extremity resistance exercise at 1-3 sets per lift, on 2-3 non-consecutive days using TheraBand/Weights to 8-15 reps on at lease 2 occasions, maintaining RPE 12-16. Once repetition maximum has been achieved, additional weight sets may be added. Hypertension:  [x] Yes  [] No  Resting BP: 142/70  Peak Exercise BP: 164/62  BP Meds: Norvasc    Intervention:  Home Exercise:  Current Exercise -    [x] Yes - walking 30-60 min 2 times per week   [] No   [] Resistance Training  Progression:  20-60 minutes of aerobic exercise on at lease 2 non-rehab days. 8-12 bilateral upper extremity resistance exercise at 1-3 sets per lift, on 2 non-consecutive days using TheraBand/Free Weights/Weight Machine to 8-15 reps on at lease 2 occasions. Once repetition maximum has been achieved, additional weight sets may be added.     Education:   [x]  Equipment [place]  [x] Understanding BP   [x] S/S to report  [x]

## 2023-05-22 ENCOUNTER — HOSPITAL ENCOUNTER (OUTPATIENT)
Dept: CARDIAC REHAB | Age: 68
Discharge: HOME | End: 2023-05-22
Payer: MEDICARE

## 2023-05-22 PROCEDURE — 93798 PHYS/QHP OP CAR RHAB W/ECG: CPT

## 2023-05-24 ENCOUNTER — HOSPITAL ENCOUNTER (OUTPATIENT)
Dept: CARDIAC REHAB | Age: 68
Discharge: HOME | End: 2023-05-24
Payer: MEDICARE

## 2023-05-24 PROCEDURE — 93798 PHYS/QHP OP CAR RHAB W/ECG: CPT

## 2023-05-25 ENCOUNTER — HOSPITAL ENCOUNTER (OUTPATIENT)
Dept: CARDIAC REHAB | Age: 68
Discharge: HOME | End: 2023-05-25
Payer: MEDICARE

## 2023-05-25 PROCEDURE — 93798 PHYS/QHP OP CAR RHAB W/ECG: CPT

## 2023-05-30 ENCOUNTER — HOSPITAL ENCOUNTER (OUTPATIENT)
Dept: CARDIAC REHAB | Age: 68
Setting detail: THERAPIES SERIES
Discharge: HOME | End: 2023-05-30
Payer: MEDICARE

## 2023-05-30 PROCEDURE — 93798 PHYS/QHP OP CAR RHAB W/ECG: CPT

## 2023-05-31 ENCOUNTER — HOSPITAL ENCOUNTER (OUTPATIENT)
Dept: CARDIAC REHAB | Age: 68
Discharge: HOME | End: 2023-05-31
Payer: MEDICARE

## 2023-05-31 PROCEDURE — 93798 PHYS/QHP OP CAR RHAB W/ECG: CPT

## 2023-06-01 ENCOUNTER — HOSPITAL ENCOUNTER (OUTPATIENT)
Dept: CARDIAC REHAB | Age: 68
Discharge: HOME | End: 2023-06-01
Payer: MEDICARE

## 2023-06-01 PROCEDURE — 93798 PHYS/QHP OP CAR RHAB W/ECG: CPT

## 2023-06-05 ENCOUNTER — HOSPITAL ENCOUNTER (OUTPATIENT)
Dept: CARDIAC REHAB | Age: 68
Discharge: HOME | End: 2023-06-05
Payer: MEDICARE

## 2023-06-05 PROCEDURE — 93798 PHYS/QHP OP CAR RHAB W/ECG: CPT

## 2023-06-07 ENCOUNTER — HOSPITAL ENCOUNTER (OUTPATIENT)
Dept: CARDIAC REHAB | Age: 68
Discharge: HOME | End: 2023-06-07
Payer: MEDICARE

## 2023-06-07 PROCEDURE — 93798 PHYS/QHP OP CAR RHAB W/ECG: CPT

## 2023-06-08 ENCOUNTER — HOSPITAL ENCOUNTER (OUTPATIENT)
Dept: CARDIAC REHAB | Age: 68
Discharge: HOME | End: 2023-06-08
Payer: MEDICARE

## 2023-06-08 PROCEDURE — 93798 PHYS/QHP OP CAR RHAB W/ECG: CPT

## 2023-06-19 DIAGNOSIS — I34.0 NONRHEUMATIC MITRAL VALVE REGURGITATION: ICD-10-CM

## 2023-06-19 DIAGNOSIS — I25.10 ASHD (ARTERIOSCLEROTIC HEART DISEASE): ICD-10-CM

## 2023-06-19 DIAGNOSIS — E66.9 CLASS 1 OBESITY WITH BODY MASS INDEX (BMI) OF 33.0 TO 33.9 IN ADULT, UNSPECIFIED OBESITY TYPE, UNSPECIFIED WHETHER SERIOUS COMORBIDITY PRESENT: ICD-10-CM

## 2023-06-19 DIAGNOSIS — Z95.820 S/P ANGIOPLASTY WITH STENT: ICD-10-CM

## 2023-06-19 DIAGNOSIS — I10 PRIMARY HYPERTENSION: ICD-10-CM

## 2023-06-19 DIAGNOSIS — I35.0 NONRHEUMATIC AORTIC (VALVE) STENOSIS: ICD-10-CM

## 2023-06-19 DIAGNOSIS — R06.02 SOB (SHORTNESS OF BREATH): ICD-10-CM

## 2023-06-19 DIAGNOSIS — Z01.810 PREOP CARDIOVASCULAR EXAM: ICD-10-CM

## 2023-06-19 DIAGNOSIS — Z98.890 S/P CARDIAC CATH: ICD-10-CM

## 2023-06-19 RX ORDER — FUROSEMIDE 40 MG/1
40 TABLET ORAL DAILY
Qty: 90 TABLET | Refills: 3 | Status: SHIPPED | OUTPATIENT
Start: 2023-06-19

## 2023-08-16 DIAGNOSIS — I10 ESSENTIAL HYPERTENSION: ICD-10-CM

## 2023-08-16 DIAGNOSIS — R73.9 HYPERGLYCEMIA: ICD-10-CM

## 2023-08-16 DIAGNOSIS — K21.9 GASTROESOPHAGEAL REFLUX DISEASE, UNSPECIFIED WHETHER ESOPHAGITIS PRESENT: ICD-10-CM

## 2023-08-16 DIAGNOSIS — E78.5 HYPERLIPIDEMIA, UNSPECIFIED HYPERLIPIDEMIA TYPE: ICD-10-CM

## 2023-08-16 RX ORDER — PANTOPRAZOLE SODIUM 40 MG/1
TABLET, DELAYED RELEASE ORAL
Qty: 90 TABLET | Refills: 0 | Status: SHIPPED | OUTPATIENT
Start: 2023-08-16

## 2023-09-12 ENCOUNTER — HOSPITAL ENCOUNTER (OUTPATIENT)
Age: 68
Discharge: HOME | End: 2023-09-12
Payer: MEDICARE

## 2023-09-12 DIAGNOSIS — R73.03 PREDIABETES: ICD-10-CM

## 2023-09-12 DIAGNOSIS — E78.5 HYPERLIPIDEMIA, UNSPECIFIED HYPERLIPIDEMIA TYPE: ICD-10-CM

## 2023-09-12 DIAGNOSIS — I10 ESSENTIAL HYPERTENSION: ICD-10-CM

## 2023-09-12 LAB
ALBUMIN SERPL-MCNC: 3.8 G/DL (ref 3.5–5.2)
ALBUMIN/GLOB SERPL: 1.1 {RATIO} (ref 1–2.5)
ALP SERPL-CCNC: 102 U/L (ref 35–104)
ALT SERPL-CCNC: 26 U/L (ref 5–33)
ANION GAP SERPL CALCULATED.3IONS-SCNC: 7 MMOL/L (ref 9–17)
AST SERPL-CCNC: 27 U/L
BILIRUB SERPL-MCNC: 0.9 MG/DL (ref 0.3–1.2)
BUN SERPL-MCNC: 11 MG/DL (ref 8–23)
BUN/CREAT SERPL: 16 (ref 9–20)
CALCIUM SERPL-MCNC: 9.4 MG/DL (ref 8.6–10.4)
CHLORIDE SERPL-SCNC: 103 MMOL/L (ref 98–107)
CHOLEST SERPL-MCNC: 121 MG/DL (ref 0–199)
CHOLESTEROL/HDL RATIO: 3
CO2 SERPL-SCNC: 30 MMOL/L (ref 20–31)
CREAT SERPL-MCNC: 0.7 MG/DL (ref 0.5–0.9)
ERYTHROCYTE [DISTWIDTH] IN BLOOD BY AUTOMATED COUNT: 12.7 % (ref 11.8–14.4)
EST. AVERAGE GLUCOSE BLD GHB EST-MCNC: 123 MG/DL
GFR SERPL CREATININE-BSD FRML MDRD: >60 ML/MIN/1.73M2
GLUCOSE SERPL-MCNC: 105 MG/DL (ref 70–99)
HBA1C MFR BLD: 5.9 % (ref 4–6)
HCT VFR BLD AUTO: 42.8 % (ref 36.3–47.1)
HDLC SERPL-MCNC: 45 MG/DL (ref 0–40)
HGB BLD-MCNC: 13.9 G/DL (ref 11.9–15.1)
LDLC SERPL CALC-MCNC: 51 MG/DL (ref 0–100)
MCH RBC QN AUTO: 28.8 PG (ref 25.2–33.5)
MCHC RBC AUTO-ENTMCNC: 32.5 G/DL (ref 28.4–34.8)
MCV RBC AUTO: 88.8 FL (ref 82.6–102.9)
NRBC BLD-RTO: 0 PER 100 WBC
PLATELET # BLD AUTO: 194 K/UL (ref 138–453)
PMV BLD AUTO: 9.9 FL (ref 8.1–13.5)
POTASSIUM SERPL-SCNC: 3.7 MMOL/L (ref 3.7–5.3)
PROT SERPL-MCNC: 7.3 G/DL (ref 6.4–8.3)
RBC # BLD AUTO: 4.82 M/UL (ref 3.95–5.11)
SODIUM SERPL-SCNC: 140 MMOL/L (ref 135–144)
TRIGL SERPL-MCNC: 124 MG/DL (ref 0–149)
VLDLC SERPL CALC-MCNC: 25 MG/DL
WBC OTHER # BLD: 7.1 K/UL (ref 3.5–11.3)

## 2023-09-12 PROCEDURE — 80061 LIPID PANEL: CPT

## 2023-09-12 PROCEDURE — 83036 HEMOGLOBIN GLYCOSYLATED A1C: CPT

## 2023-09-12 PROCEDURE — 36415 COLL VENOUS BLD VENIPUNCTURE: CPT

## 2023-09-12 PROCEDURE — 80053 COMPREHEN METABOLIC PANEL: CPT

## 2023-09-12 PROCEDURE — 85027 COMPLETE CBC AUTOMATED: CPT

## 2023-09-18 ENCOUNTER — OFFICE VISIT (OUTPATIENT)
Dept: PRIMARY CARE CLINIC | Age: 68
End: 2023-09-18
Payer: MEDICARE

## 2023-09-18 VITALS
WEIGHT: 193 LBS | DIASTOLIC BLOOD PRESSURE: 70 MMHG | SYSTOLIC BLOOD PRESSURE: 130 MMHG | HEART RATE: 73 BPM | BODY MASS INDEX: 32.95 KG/M2 | HEIGHT: 64 IN | RESPIRATION RATE: 18 BRPM | TEMPERATURE: 98 F | OXYGEN SATURATION: 98 %

## 2023-09-18 DIAGNOSIS — H93.12 TINNITUS OF LEFT EAR: ICD-10-CM

## 2023-09-18 DIAGNOSIS — R42 DIZZINESS: ICD-10-CM

## 2023-09-18 DIAGNOSIS — Z78.0 POST-MENOPAUSAL: ICD-10-CM

## 2023-09-18 DIAGNOSIS — M85.80 OSTEOPENIA, UNSPECIFIED LOCATION: ICD-10-CM

## 2023-09-18 DIAGNOSIS — Z00.00 MEDICARE ANNUAL WELLNESS VISIT, SUBSEQUENT: Primary | ICD-10-CM

## 2023-09-18 PROCEDURE — 3075F SYST BP GE 130 - 139MM HG: CPT | Performed by: NURSE PRACTITIONER

## 2023-09-18 PROCEDURE — G0439 PPPS, SUBSEQ VISIT: HCPCS | Performed by: NURSE PRACTITIONER

## 2023-09-18 PROCEDURE — 99214 OFFICE O/P EST MOD 30 MIN: CPT | Performed by: NURSE PRACTITIONER

## 2023-09-18 PROCEDURE — 1123F ACP DISCUSS/DSCN MKR DOCD: CPT | Performed by: NURSE PRACTITIONER

## 2023-09-18 PROCEDURE — 3078F DIAST BP <80 MM HG: CPT | Performed by: NURSE PRACTITIONER

## 2023-09-18 ASSESSMENT — ENCOUNTER SYMPTOMS
COUGH: 0
RHINORRHEA: 0
DIARRHEA: 0
SINUS PAIN: 0
WHEEZING: 0
SINUS PRESSURE: 1
SORE THROAT: 0
ABDOMINAL PAIN: 1
SHORTNESS OF BREATH: 0
CONSTIPATION: 0

## 2023-09-18 ASSESSMENT — PATIENT HEALTH QUESTIONNAIRE - PHQ9
1. LITTLE INTEREST OR PLEASURE IN DOING THINGS: 0
SUM OF ALL RESPONSES TO PHQ QUESTIONS 1-9: 0
2. FEELING DOWN, DEPRESSED OR HOPELESS: 0
SUM OF ALL RESPONSES TO PHQ9 QUESTIONS 1 & 2: 0

## 2023-09-18 ASSESSMENT — LIFESTYLE VARIABLES
HOW MANY STANDARD DRINKS CONTAINING ALCOHOL DO YOU HAVE ON A TYPICAL DAY: PATIENT DOES NOT DRINK
HOW OFTEN DO YOU HAVE A DRINK CONTAINING ALCOHOL: NEVER

## 2023-09-18 NOTE — PATIENT INSTRUCTIONS
SURVEY:    You may be receiving a survey from [employer] regarding your visit today. Please complete the survey to enable us to provide the highest quality of care to you and your family. If you cannot score us a very good on any question, please call the office to discuss how we could of made your experience a very good one. Thank you.

## 2023-09-18 NOTE — PROGRESS NOTES
UTD   - Encouraged covid booster, pneumococcal vaccine, shingles, and flu vaccine   - Mammogram UTD   - Colorectal cancer screening UTD   - Mini cog score 5/5   - Reviewed labs 9/2023    - Repeat DEXA ordered     Abdominal cramping:   - No other GI sx such as diarrhea, nausea, or vomiting   - Patient has  work up scheduled this week including KUB and f/u with [facility] 9/26/23.  Cannot rule out renal stone   - If persistent sx after  work up ordered by urology, patient to notify office so that further evaluation can be completed     Dizziness:   - Worse with positional changes   - Encouraged 64 ounces of water daily   - Sx not suggestive of vertigo   - Tilt table test ordered     Tinnitus:  - Referral to Dr. Barahona Minus audiology placed today       CareTeam (Including outside providers/suppliers regularly involved in providing care):   Patient Care Team:  AIDEE Marlow CNP as PCP - General (Family Nurse Practitioner)  AIDEE Marlow CNP as PCP - Empaneled Provider  AIDEE Marlow CNP (Family Nurse Practitioner)     Reviewed and updated this visit:  Allergies  Meds

## 2023-09-20 ENCOUNTER — HOSPITAL ENCOUNTER (OUTPATIENT)
Dept: GENERAL RADIOLOGY | Age: 68
Discharge: HOME | End: 2023-09-22
Payer: MEDICARE

## 2023-09-20 ENCOUNTER — HOSPITAL ENCOUNTER (OUTPATIENT)
Age: 68
Discharge: HOME | End: 2023-09-22
Payer: MEDICARE

## 2023-09-20 DIAGNOSIS — N20.0 KIDNEY STONES: ICD-10-CM

## 2023-09-20 PROCEDURE — 74018 RADEX ABDOMEN 1 VIEW: CPT

## 2023-09-26 ENCOUNTER — HOSPITAL ENCOUNTER (OUTPATIENT)
Age: 68
Setting detail: SPECIMEN
Discharge: HOME | End: 2023-09-26
Payer: MEDICARE

## 2023-09-26 ENCOUNTER — OFFICE VISIT (OUTPATIENT)
Dept: UROLOGY | Age: 68
End: 2023-09-26
Payer: MEDICARE

## 2023-09-26 ENCOUNTER — TELEPHONE (OUTPATIENT)
Dept: UROLOGY | Age: 68
End: 2023-09-26

## 2023-09-26 VITALS
TEMPERATURE: 97.1 F | DIASTOLIC BLOOD PRESSURE: 65 MMHG | HEIGHT: 64 IN | HEART RATE: 69 BPM | WEIGHT: 192 LBS | BODY MASS INDEX: 32.78 KG/M2 | SYSTOLIC BLOOD PRESSURE: 104 MMHG

## 2023-09-26 DIAGNOSIS — R31.0 GROSS HEMATURIA: ICD-10-CM

## 2023-09-26 DIAGNOSIS — N20.0 KIDNEY STONES: Primary | ICD-10-CM

## 2023-09-26 DIAGNOSIS — Z01.818 PRE-OP TESTING: ICD-10-CM

## 2023-09-26 LAB
BACTERIA URNS QL MICRO: ABNORMAL
BILIRUB UR QL STRIP: NEGATIVE
CLARITY UR: CLEAR
COLOR UR: YELLOW
EPI CELLS #/AREA URNS HPF: ABNORMAL /HPF (ref 0–25)
GLUCOSE UR STRIP-MCNC: NEGATIVE MG/DL
HGB UR QL STRIP.AUTO: NEGATIVE
KETONES UR STRIP-MCNC: NEGATIVE MG/DL
LEUKOCYTE ESTERASE UR QL STRIP: ABNORMAL
NITRITE UR QL STRIP: NEGATIVE
PH UR STRIP: 6 [PH] (ref 5–9)
PROT UR STRIP-MCNC: NEGATIVE MG/DL
RBC #/AREA URNS HPF: ABNORMAL /HPF (ref 0–2)
SP GR UR STRIP: 1.01 (ref 1.01–1.02)
UROBILINOGEN UR STRIP-ACNC: NORMAL EU/DL (ref 0–1)
WBC #/AREA URNS HPF: ABNORMAL /HPF (ref 0–5)

## 2023-09-26 PROCEDURE — 3074F SYST BP LT 130 MM HG: CPT | Performed by: PHYSICIAN ASSISTANT

## 2023-09-26 PROCEDURE — 99213 OFFICE O/P EST LOW 20 MIN: CPT | Performed by: PHYSICIAN ASSISTANT

## 2023-09-26 PROCEDURE — 3078F DIAST BP <80 MM HG: CPT | Performed by: PHYSICIAN ASSISTANT

## 2023-09-26 PROCEDURE — 81001 URINALYSIS AUTO W/SCOPE: CPT

## 2023-09-26 PROCEDURE — 1123F ACP DISCUSS/DSCN MKR DOCD: CPT | Performed by: PHYSICIAN ASSISTANT

## 2023-09-26 ASSESSMENT — ENCOUNTER SYMPTOMS
APNEA: 0
ABDOMINAL PAIN: 0
COLOR CHANGE: 0
BACK PAIN: 0
SHORTNESS OF BREATH: 0
COUGH: 0
NAUSEA: 0
WHEEZING: 0
VOMITING: 0
CONSTIPATION: 0
EYE REDNESS: 0

## 2023-09-26 NOTE — PATIENT INSTRUCTIONS
STONE PREVENTION    To prevent future stone formation:    1) DO drink ~65-80 oz (2-2.5L) of water per day to stay adequately hydrated     2) AVOID/LIMIT intake of \"bad fluids\": soda/pop, coffee, tea, alcohol, energy drinks, any beverage with caffeine can act to dehydrate you       \"BAD FLUIDS\" DO NOT COUNT TOWARD THE 65-80oz of water    3) REDUCE consumption of sodium/salt - DO NOT salt your food, read labels (lunch meats, canned soups, prepared meals are high in salt), choose low salt options    4) DO NOT EAT animal protein/meat more than 2 meals a day - this includes red meat, pork, poultry and fish    SURVEY:    You may be receiving a survey from [employer] regarding your visit today. Please complete the survey to enable us to provide the highest quality of care to you and your family. If you cannot score us a very good on any question, please call the office to discuss how we could have made your experience a very good one. Thank you.

## 2023-09-26 NOTE — TELEPHONE ENCOUNTER
----- Message from Judson Ricks PA-C sent at 9/26/2023  1:28 PM EDT -----  Please let her know there is no significant blood seen in her urine

## 2023-10-04 ENCOUNTER — HOSPITAL ENCOUNTER (OUTPATIENT)
Dept: WOMENS IMAGING | Age: 68
Discharge: HOME | End: 2023-10-06
Payer: MEDICARE

## 2023-10-04 ENCOUNTER — HOSPITAL ENCOUNTER (OUTPATIENT)
Age: 68
Discharge: HOME | End: 2023-10-06
Payer: MEDICARE

## 2023-10-04 DIAGNOSIS — R42 DIZZINESS: ICD-10-CM

## 2023-10-04 DIAGNOSIS — Z78.0 POST-MENOPAUSAL: ICD-10-CM

## 2023-10-04 DIAGNOSIS — M85.80 OSTEOPENIA, UNSPECIFIED LOCATION: ICD-10-CM

## 2023-10-04 LAB
TILT CV INITIAL SUPINE HEART RATE: 61 BPM
TILT CV INITIAL SUPINE MAX BP: NORMAL BPM
TILT CV INITIAL SUPINE RHYTHM: NORMAL
TILT CV INITIAL TILT BLOOD PRESSURE: NORMAL MMHG
TILT CV INITIAL TILT HEART RATE: 71 BPM
TILT CV INITIAL TILT RHYTHM: NORMAL
TILT CV MAX BP BLOOD PRESSURE: NORMAL MMHG
TILT CV MAX BP HEART RATE: 79 BPM
TILT CV MAX BP MINUTES: 18
TILT CV MAX BP RHYTHM: NORMAL
TILT CV MAX HEART RATE: 118 BPM
TILT CV MAX HR BLOOD PRESSURE: NORMAL MMHG
TILT CV MAX HR MINUTES: 24
TILT CV MAX HR RHYTHM: NORMAL
TILT CV MINIMUM BP BLOOD PRESSURE: NORMAL MMHG
TILT CV MINIMUM BP HEART RATE: 37 BPM
TILT CV MINIMUM BP MINUTES: 27
TILT CV MINIMUM BP RHYTHM: NORMAL
TILT CV MINIMUM HR BP: NORMAL MMHG
TILT CV MINIMUM HR HEART RATE: 37 BPM
TILT CV MINIMUM HR MINUTES: 27
TILT CV MINIMUM HR RHYTHM: NORMAL

## 2023-10-04 PROCEDURE — 93660 TILT TABLE EVALUATION: CPT | Performed by: FAMILY MEDICINE

## 2023-10-04 PROCEDURE — 93660 TILT TABLE EVALUATION: CPT

## 2023-10-04 PROCEDURE — 77080 DXA BONE DENSITY AXIAL: CPT

## 2023-10-04 PROCEDURE — 6370000000 HC RX 637 (ALT 250 FOR IP): Performed by: FAMILY MEDICINE

## 2023-10-04 RX ORDER — NITROGLYCERIN 0.3 MG/1
0.3 TABLET SUBLINGUAL
Status: COMPLETED | OUTPATIENT
Start: 2023-10-04 | End: 2023-10-04

## 2023-10-04 RX ADMIN — NITROGLYCERIN 0.3 MG: 0.3 TABLET SUBLINGUAL at 14:50

## 2023-10-17 ENCOUNTER — TELEPHONE (OUTPATIENT)
Dept: PRIMARY CARE CLINIC | Age: 68
End: 2023-10-17

## 2023-10-17 NOTE — TELEPHONE ENCOUNTER
Please notify patient I have talked with her audiologist, Dr. Alec Hernandez regarding his concern with \"blotches/pockets\" of bright red blood during his video otoscopy exam of the left TM.  Will discuss further at upcoming appt 10/19/23    [facility] - please add \"+discuss audiology report\" to 10/19/23 appt notes

## 2023-10-19 ENCOUNTER — OFFICE VISIT (OUTPATIENT)
Dept: PRIMARY CARE CLINIC | Age: 68
End: 2023-10-19
Payer: MEDICARE

## 2023-10-19 ENCOUNTER — TELEPHONE (OUTPATIENT)
Dept: PHARMACY | Facility: CLINIC | Age: 68
End: 2023-10-19

## 2023-10-19 VITALS
WEIGHT: 193 LBS | SYSTOLIC BLOOD PRESSURE: 131 MMHG | DIASTOLIC BLOOD PRESSURE: 78 MMHG | BODY MASS INDEX: 32.95 KG/M2 | TEMPERATURE: 97 F | HEART RATE: 77 BPM | OXYGEN SATURATION: 97 % | HEIGHT: 64 IN

## 2023-10-19 DIAGNOSIS — G90.1 DYSAUTONOMIA (HCC): Primary | ICD-10-CM

## 2023-10-19 DIAGNOSIS — H93.13 TINNITUS OF BOTH EARS: ICD-10-CM

## 2023-10-19 DIAGNOSIS — H73.93 DISORDER OF TYMPANIC MEMBRANE OF BOTH EARS: ICD-10-CM

## 2023-10-19 DIAGNOSIS — R42 DIZZINESS: ICD-10-CM

## 2023-10-19 PROCEDURE — 3074F SYST BP LT 130 MM HG: CPT | Performed by: NURSE PRACTITIONER

## 2023-10-19 PROCEDURE — 3078F DIAST BP <80 MM HG: CPT | Performed by: NURSE PRACTITIONER

## 2023-10-19 PROCEDURE — G0008 ADMIN INFLUENZA VIRUS VAC: HCPCS | Performed by: NURSE PRACTITIONER

## 2023-10-19 PROCEDURE — 99214 OFFICE O/P EST MOD 30 MIN: CPT | Performed by: NURSE PRACTITIONER

## 2023-10-19 PROCEDURE — G0009 ADMIN PNEUMOCOCCAL VACCINE: HCPCS | Performed by: NURSE PRACTITIONER

## 2023-10-19 PROCEDURE — 1123F ACP DISCUSS/DSCN MKR DOCD: CPT | Performed by: NURSE PRACTITIONER

## 2023-10-19 PROCEDURE — 90670 PCV13 VACCINE IM: CPT | Performed by: NURSE PRACTITIONER

## 2023-10-19 PROCEDURE — 90694 VACC AIIV4 NO PRSRV 0.5ML IM: CPT | Performed by: NURSE PRACTITIONER

## 2023-10-19 NOTE — PROGRESS NOTES
Name: Catherine aHll  : 1955         Chief Complaint:     Chief Complaint   Patient presents with    Dizziness     -Pt here forTilt table results       History of Present Illness:      Catherine Hall is a 75 y.o.  female who presents with Dizziness (-Pt here forTilt table results)      HPI    The patient presents to discuss tilt table test results and audiology report. Dysautonomia:  Tilt table test completed 10/4/2023 d/t symptoms of dizziness showed abnormal head upright tilt study, most consistent with dysautonomia. Current rx treatment includes none. She is not on SSRI or beta blocker therapy. She does follow with [facility] cardiology. Left TM Lesions:  Audiology Dr. Nahun Kirk noted during his most recent appointment \"blotches/pockets\" of bright red blood during his video otoscopy exam of the left TM. She does have hx of tinnitus. Past Medical History:     Past Medical History:   Diagnosis Date    Allergic rhinitis     Aphthous ulcer of mouth     CAD (coronary artery disease)     H/O echocardiogram 2015    EF 65%. Aortic valve sclerosis without stenosis. Mild aortic regurgitation. Myxomatous thickening of the mitral leaflets with Moderate mitral regurgitation. Mils (grade l) diastolic dysfunction. H/O echocardiogram 2016    EF >60%. Normal LV wall thickness and cavity size. No definite specific wall motion abnormalities were identified. Left atrium is mildly dilated (29-33) left atrial volume index of 31 ml/m2. Mild aortic stenosis. Myxomatous thickening of the mitral leaflets with moderate eccentric mitral regurg. Mild treicuspid regurg. Mild diastolic dysfunction is seen. H/O echocardiogram 2018    EF >55%. The LV wall thickness is mildly incrased. Moderate mitral regurg. Mild aortic stenosis. Mild aortic regurg. Mild tricuspid regurg. Evidence of mild (grade I) diastolic dysfucntion is seen.      Hyperglycemia 2013    Hyperlipidemia     Hypertension

## 2023-10-19 NOTE — PATIENT INSTRUCTIONS
SURVEY:    You may be receiving a survey from [employer] regarding your visit today. Please complete the survey to enable us to provide the highest quality of care to you and your family. If you cannot score us a very good on any question, please call the office to discuss how we could have made your experience a very good one. Thank you.

## 2023-10-19 NOTE — TELEPHONE ENCOUNTER
Influenza, High Dose (Fluzone 65 yrs and older) 11/13/2014    Influenza, Trivalent Vaccine 6-35 Months, IM, Preservative Free 10/01/2016    Pneumococcal, PCV-13, PREVNAR 13, (age 6w+), IM, 0.5mL 10/19/2023    TDaP, ADACEL (age 6y-58y), Wilfredo Yoo (age 10y+), IM, 0.5mL 11/08/2022       ASSESSMENT/PLAN:   - General Assessment:   Adherence: Taking all meds as prescribed  Cost: not an issue  Drug interactions: No clinically significant interactions identified via [employer] Interaction Analysis as category D or higher. Renal dosing: No renal adjustments necessary. Pt reports issues with ongoing, worsening tinnitus and wonders if related to medication  Furosemide use has been associated with tinnitus. Typically only high doses in patients with low renal function. Also tends to be acute. Pt has been taking furosemide for a long time now  Pantoprazole- There have been some studies that showed PPI use being associated with tinnitus, but there is conflicting data. Amlodipine- There is possible association with development/worsening of tinnitus and antihypertensives. There is a stronger association with ACE/ARBs, but it has shown to be possible with any antihypertensive. Overall pt reports underlying tinnitus for a very long time. Reports she had a stent placed in her heart vessel last March. At that time she started Clopidogrel, Atorvastatin, and amlodipine. Since then she reports that her tinnitus has worsened. Advised her to speak with her cardiologist.  Clopidogrel and Atorvastatin should not be stopped, but it may be possible to trial stopping amlodipine to see if this helps at all. Discussed all OTC items that she is taking. Doses are all acceptable. There is some information available showing that B12 and magnesium supplementation may actually help with tinnitus. Pt is going to try OTC B12.   No concerns with any other meds      - Upcoming appointments:   Future Appointments   Date Time Provider

## 2023-10-20 RX ORDER — CHOLECALCIFEROL (VITAMIN D3) 125 MCG
500 CAPSULE ORAL DAILY
COMMUNITY

## 2023-10-22 ENCOUNTER — TELEPHONE (OUTPATIENT)
Dept: PRIMARY CARE CLINIC | Age: 68
End: 2023-10-22

## 2023-10-22 PROBLEM — G90.1 DYSAUTONOMIA (HCC): Status: ACTIVE | Noted: 2023-10-22

## 2023-10-22 NOTE — TELEPHONE ENCOUNTER
Alfonzo Rubio and Quynh,     Please notify patient that she was inadvertently administered prevnar 13 vaccination as opposed to the ordered pneumococcal 20 vaccine by Cammie Madrigal. Patient had received prevnar 13 vaccine 4/2022 per records. She should experience no adverse reaction for this. Will plan to complete pneumococcal 20 in 1 year. At [facility], supervisor of [facility], please notify patient and place safe care.

## 2023-10-23 NOTE — TELEPHONE ENCOUNTER
Attempted to contact patient, unable to reach and unable to leave voice message. Ladonna Lopes, if I am unable to notify patient prior to end of day, today, due to upcoming time off, please contact patient to make her aware of this and if she has any questions or concerns, let her know that I would be happy to discuss with her upon returning on 10/30. Please document and forward back to me if patient requests additional follow up. Thank you!

## 2023-11-08 LAB — DIABETIC RETINOPATHY: NEGATIVE

## 2023-11-13 ENCOUNTER — HOSPITAL ENCOUNTER (OUTPATIENT)
Age: 68
Discharge: HOME | End: 2023-11-15
Payer: MEDICARE

## 2023-11-13 ENCOUNTER — OFFICE VISIT (OUTPATIENT)
Dept: PRIMARY CARE CLINIC | Age: 68
End: 2023-11-13
Payer: MEDICARE

## 2023-11-13 ENCOUNTER — HOSPITAL ENCOUNTER (OUTPATIENT)
Dept: GENERAL RADIOLOGY | Age: 68
Discharge: HOME | End: 2023-11-15
Payer: MEDICARE

## 2023-11-13 VITALS
TEMPERATURE: 96.2 F | BODY MASS INDEX: 33.63 KG/M2 | HEART RATE: 70 BPM | SYSTOLIC BLOOD PRESSURE: 120 MMHG | OXYGEN SATURATION: 100 % | HEIGHT: 64 IN | WEIGHT: 197 LBS | DIASTOLIC BLOOD PRESSURE: 60 MMHG

## 2023-11-13 DIAGNOSIS — R42 DIZZINESS: Primary | ICD-10-CM

## 2023-11-13 DIAGNOSIS — M54.2 NECK PAIN: ICD-10-CM

## 2023-11-13 DIAGNOSIS — G44.201 ACUTE INTRACTABLE TENSION-TYPE HEADACHE: ICD-10-CM

## 2023-11-13 DIAGNOSIS — R11.0 NAUSEA: ICD-10-CM

## 2023-11-13 DIAGNOSIS — H93.13 TINNITUS OF BOTH EARS: ICD-10-CM

## 2023-11-13 PROCEDURE — 72050 X-RAY EXAM NECK SPINE 4/5VWS: CPT

## 2023-11-13 PROCEDURE — 3074F SYST BP LT 130 MM HG: CPT | Performed by: NURSE PRACTITIONER

## 2023-11-13 PROCEDURE — 3078F DIAST BP <80 MM HG: CPT | Performed by: NURSE PRACTITIONER

## 2023-11-13 PROCEDURE — 1123F ACP DISCUSS/DSCN MKR DOCD: CPT | Performed by: NURSE PRACTITIONER

## 2023-11-13 PROCEDURE — 99214 OFFICE O/P EST MOD 30 MIN: CPT | Performed by: NURSE PRACTITIONER

## 2023-11-13 NOTE — PROGRESS NOTES
Name: Ledy Garcia  : 1955         Chief Complaint:     Chief Complaint   Patient presents with    Dysautonomia     -1 Month f/u   -pts states she is some what better  -not as much dizziness but still having headaches  -drinking a lot of water  -ring in her ears         History of Present Illness:      Ledy Garcia is a 75 y.o.  female who presents with Dysautonomia (-1 Month f/u /-pts states she is some what better/-not as much dizziness but still having headaches/-drinking a lot of water/-ring in her ears/)      HPI    The patient presents to follow up on dysautonomia and dizziness. Tilt table test completed 10/4/2023 d/t symptoms of dizziness showed abnormal head upright tilt study, most consistent with dysautonomia. Current rx treatment includes none. She is not on SSRI or beta blocker therapy. She does follow with [facility] cardiology. She was seen in office 10/2023 when she was encouraged to drink 64-80 ounces of water daily, continue moderate salt intake, and wear compression stockings today. Today, she states she has increased her fluid intake to 50-60 fluid ounces of water daily. The dizziness is not as severe as it was. She is continuing with bilateral tinnitus and is planning to f/u with ENT next week at [facility]. She is having headaches that have been ongoing for 1 month. Headaches start in occipital area and radiates forward. She admits \"crunching and crackling\" in her neck. She is taking tylenol with minimal relief. She is having headaches daily. Admits nausea only when dizziness is present. She has been wearing compression stockings. Past Medical History:     Past Medical History:   Diagnosis Date    Allergic rhinitis     Aphthous ulcer of mouth     CAD (coronary artery disease)     H/O echocardiogram 2015    EF 65%. Aortic valve sclerosis without stenosis. Mild aortic regurgitation. Myxomatous thickening of the mitral leaflets with Moderate mitral regurgitation.  Mils

## 2023-11-13 NOTE — PATIENT INSTRUCTIONS
SURVEY:    You may be receiving a survey from [employer] regarding your visit today. Please complete the survey to enable us to provide the highest quality of care to you and your family. If you cannot score us a very good on any question, please call the office to discuss how we could have made your experience a very good one. Thank you.

## 2023-11-14 PROBLEM — M47.812 CERVICAL SPINE ARTHRITIS: Status: ACTIVE | Noted: 2023-11-14

## 2023-12-05 ENCOUNTER — HOSPITAL ENCOUNTER (OUTPATIENT)
Dept: MRI IMAGING | Age: 68
Discharge: HOME | End: 2023-12-07
Payer: MEDICARE

## 2023-12-05 DIAGNOSIS — R42 DIZZINESS: ICD-10-CM

## 2023-12-05 DIAGNOSIS — G44.201 ACUTE INTRACTABLE TENSION-TYPE HEADACHE: ICD-10-CM

## 2023-12-05 DIAGNOSIS — H93.13 TINNITUS OF BOTH EARS: ICD-10-CM

## 2023-12-05 DIAGNOSIS — R11.0 NAUSEA: ICD-10-CM

## 2023-12-05 PROCEDURE — 70551 MRI BRAIN STEM W/O DYE: CPT

## 2024-01-18 ENCOUNTER — OFFICE VISIT (OUTPATIENT)
Dept: PRIMARY CARE CLINIC | Age: 69
End: 2024-01-18
Payer: MEDICARE

## 2024-01-18 VITALS
OXYGEN SATURATION: 99 % | BODY MASS INDEX: 33.57 KG/M2 | SYSTOLIC BLOOD PRESSURE: 132 MMHG | HEIGHT: 64 IN | RESPIRATION RATE: 18 BRPM | WEIGHT: 196.6 LBS | DIASTOLIC BLOOD PRESSURE: 72 MMHG | TEMPERATURE: 97.2 F | HEART RATE: 64 BPM

## 2024-01-18 DIAGNOSIS — E78.5 HYPERLIPIDEMIA, UNSPECIFIED HYPERLIPIDEMIA TYPE: ICD-10-CM

## 2024-01-18 DIAGNOSIS — Z11.59 NEED FOR HEPATITIS C SCREENING TEST: ICD-10-CM

## 2024-01-18 DIAGNOSIS — Z12.31 BREAST CANCER SCREENING BY MAMMOGRAM: ICD-10-CM

## 2024-01-18 DIAGNOSIS — R73.03 PREDIABETES: ICD-10-CM

## 2024-01-18 DIAGNOSIS — G90.1 DYSAUTONOMIA (HCC): ICD-10-CM

## 2024-01-18 DIAGNOSIS — R51.9 ACUTE NONINTRACTABLE HEADACHE, UNSPECIFIED HEADACHE TYPE: ICD-10-CM

## 2024-01-18 DIAGNOSIS — R42 DIZZINESS: Primary | ICD-10-CM

## 2024-01-18 DIAGNOSIS — I10 ESSENTIAL HYPERTENSION: ICD-10-CM

## 2024-01-18 LAB — HBA1C MFR BLD: 6.1 %

## 2024-01-18 PROCEDURE — 3078F DIAST BP <80 MM HG: CPT | Performed by: NURSE PRACTITIONER

## 2024-01-18 PROCEDURE — 99214 OFFICE O/P EST MOD 30 MIN: CPT | Performed by: NURSE PRACTITIONER

## 2024-01-18 PROCEDURE — 1123F ACP DISCUSS/DSCN MKR DOCD: CPT | Performed by: NURSE PRACTITIONER

## 2024-01-18 PROCEDURE — 83036 HEMOGLOBIN GLYCOSYLATED A1C: CPT | Performed by: NURSE PRACTITIONER

## 2024-01-18 PROCEDURE — 3075F SYST BP GE 130 - 139MM HG: CPT | Performed by: NURSE PRACTITIONER

## 2024-01-18 RX ORDER — FLUTICASONE PROPIONATE 50 MCG
SPRAY, SUSPENSION (ML) NASAL
COMMUNITY
Start: 2023-11-22

## 2024-01-18 ASSESSMENT — PATIENT HEALTH QUESTIONNAIRE - PHQ9
SUM OF ALL RESPONSES TO PHQ QUESTIONS 1-9: 0
SUM OF ALL RESPONSES TO PHQ QUESTIONS 1-9: 0
SUM OF ALL RESPONSES TO PHQ9 QUESTIONS 1 & 2: 0
2. FEELING DOWN, DEPRESSED OR HOPELESS: 0
SUM OF ALL RESPONSES TO PHQ QUESTIONS 1-9: 0
SUM OF ALL RESPONSES TO PHQ QUESTIONS 1-9: 0
1. LITTLE INTEREST OR PLEASURE IN DOING THINGS: 0

## 2024-01-18 NOTE — PROGRESS NOTES
Name: Annabel Mcknight  : 1955         Chief Complaint:     Chief Complaint   Patient presents with    Headache     -patient reports that headaches are occasional and not as frequent.   -seen ENT and taking vitamin b12.  -patient states tinnitus is getting better    Dizziness     -patient states that dizziness is getting better       History of Present Illness:      Annabel Mcknight is a 68 y.o.  female who presents with Headache (-patient reports that headaches are occasional and not as frequent. /-seen ENT and taking vitamin b12./-patient states tinnitus is getting better) and Dizziness (-patient states that dizziness is getting better)      HPI    24 HPI:  The patient presents for follow up of dizziness and headaches. MRI brain 23 shows chronic microvascular disease without acute intracranial abnormality. XR cervical spine 23 shows multilevel degenerative change. She was evaluated by ENT at [facility]. Records from ENT not available for review, but patient states \"everything is pretty much okay\". She started taking fluticasone nasal spray and B12. Today, she states \"things have been a lot better\". She states her headaches have been less frequent and less severe. Headaches are worse during stress. She has been increasingly stressed d/t health of her sister. She has had less ringing in her ears. . She states her dizziness has been \"good\" lately.      2023 HPI:  The patient presents to follow up on dysautonomia and dizziness. Tilt table test completed 10/4/2023 d/t symptoms of dizziness showed abnormal head upright tilt study, most consistent with dysautonomia. Current rx treatment includes none. She is not on SSRI or beta blocker therapy. She does follow with [facility] cardiology. She was seen in office 10/2023 when she was encouraged to drink 64-80 ounces of water daily, continue moderate salt intake, and wear compression stockings today. Today, she states she has increased her fluid

## 2024-02-19 DIAGNOSIS — E78.5 HYPERLIPIDEMIA, UNSPECIFIED HYPERLIPIDEMIA TYPE: ICD-10-CM

## 2024-02-19 DIAGNOSIS — I10 ESSENTIAL HYPERTENSION: ICD-10-CM

## 2024-02-19 DIAGNOSIS — K21.9 GASTROESOPHAGEAL REFLUX DISEASE, UNSPECIFIED WHETHER ESOPHAGITIS PRESENT: ICD-10-CM

## 2024-02-19 DIAGNOSIS — R73.9 HYPERGLYCEMIA: ICD-10-CM

## 2024-02-19 RX ORDER — PANTOPRAZOLE SODIUM 40 MG/1
TABLET, DELAYED RELEASE ORAL
Qty: 90 TABLET | Refills: 0 | Status: SHIPPED | OUTPATIENT
Start: 2024-02-19

## 2024-02-26 RX ORDER — AMLODIPINE BESYLATE 2.5 MG/1
2.5 TABLET ORAL DAILY
Qty: 30 TABLET | Refills: 0 | Status: SHIPPED | OUTPATIENT
Start: 2024-02-26

## 2024-03-01 ENCOUNTER — HOSPITAL ENCOUNTER (OUTPATIENT)
Dept: WOMENS IMAGING | Age: 69
End: 2024-03-01
Payer: MEDICARE

## 2024-03-01 DIAGNOSIS — Z12.31 BREAST CANCER SCREENING BY MAMMOGRAM: ICD-10-CM

## 2024-03-01 PROCEDURE — 77063 BREAST TOMOSYNTHESIS BI: CPT

## 2024-03-11 ENCOUNTER — OFFICE VISIT (OUTPATIENT)
Dept: CARDIOLOGY | Age: 69
End: 2024-03-11
Payer: MEDICARE

## 2024-03-11 VITALS
HEART RATE: 70 BPM | HEIGHT: 64 IN | SYSTOLIC BLOOD PRESSURE: 121 MMHG | WEIGHT: 198 LBS | RESPIRATION RATE: 18 BRPM | BODY MASS INDEX: 33.8 KG/M2 | DIASTOLIC BLOOD PRESSURE: 61 MMHG | OXYGEN SATURATION: 98 %

## 2024-03-11 DIAGNOSIS — R07.89 CHEST DISCOMFORT: Primary | ICD-10-CM

## 2024-03-11 DIAGNOSIS — I20.9 ANGINA, CLASS III (HCC): ICD-10-CM

## 2024-03-11 DIAGNOSIS — E66.9 CLASS 1 OBESITY WITH BODY MASS INDEX (BMI) OF 33.0 TO 33.9 IN ADULT, UNSPECIFIED OBESITY TYPE, UNSPECIFIED WHETHER SERIOUS COMORBIDITY PRESENT: ICD-10-CM

## 2024-03-11 DIAGNOSIS — I35.0 MILD AORTIC STENOSIS: ICD-10-CM

## 2024-03-11 DIAGNOSIS — I25.10 CORONARY ARTERY DISEASE INVOLVING NATIVE CORONARY ARTERY OF NATIVE HEART WITHOUT ANGINA PECTORIS: ICD-10-CM

## 2024-03-11 DIAGNOSIS — I10 ESSENTIAL HYPERTENSION: ICD-10-CM

## 2024-03-11 DIAGNOSIS — Z95.820 S/P ANGIOPLASTY WITH STENT: ICD-10-CM

## 2024-03-11 PROCEDURE — 93000 ELECTROCARDIOGRAM COMPLETE: CPT | Performed by: FAMILY MEDICINE

## 2024-03-11 PROCEDURE — 3074F SYST BP LT 130 MM HG: CPT | Performed by: FAMILY MEDICINE

## 2024-03-11 PROCEDURE — 3078F DIAST BP <80 MM HG: CPT | Performed by: FAMILY MEDICINE

## 2024-03-11 PROCEDURE — 99214 OFFICE O/P EST MOD 30 MIN: CPT | Performed by: FAMILY MEDICINE

## 2024-03-11 PROCEDURE — 1123F ACP DISCUSS/DSCN MKR DOCD: CPT | Performed by: FAMILY MEDICINE

## 2024-03-11 RX ORDER — AMLODIPINE BESYLATE 5 MG/1
5 TABLET ORAL DAILY
Qty: 90 TABLET | Refills: 3 | Status: SHIPPED | OUTPATIENT
Start: 2024-03-11

## 2024-03-11 NOTE — PROGRESS NOTES
symptoms can certainly be caused by significant coronary artery disease, I ordered a Regadenoson (Lexiscan) stress test with SPECT imaging to try and rule out this possibility.    Essential Hypertension: Controlled  Calcium Channel Blocker: INCREASE to amlodipine (Norvasc) 5 mg once daily. I also discussed the potential side effects of this medication including lightheadedness and dizziness and instructed them to stop the medication of this occurs and call our office if this occurs.  Diuretics: Continue furosemide (Lasix) 40 mg every morning.     Obesity: Body mass index is 33.99 kg/m².   I also briefly discussed both diet and exercise strategies for her to continue to loses weight and she was very receptive to this.    Chronic Headaches:   She's feels this could be related to the weather, arthritis in her neck or even her allergies. I tend to agree and I advised her to continue to follow up with Stephania Ryan APRN - CNP.     Finally, I recommended that she continue her other medications and follow up with you as previously scheduled.    FOLLOW UP:   I told Ms. Mcknight to call my office if she had any problems, but otherwise told her to Return in about 3 months (around 6/11/2024). However, I would be happy to see her sooner should the need arise.    Sincerely,   Dk Jefferson  [facility], [place] [id number]  Phone: [phone], Fax: [phone]    I believe that the risk of significant morbidity and mortality related to the patient's current medical conditions are: intermediate-high. Approximately 30 minutes were spent during prep work, discussion and exam of the patient, and follow up documentation and all of their questions were answered.    The documentation recorded by the scribe, accurately and completely reflects the services I personally performed and the decisions made by me. Dk Jefferson MD, MS, F.A.C.C. March 11, 2024

## 2024-03-16 DIAGNOSIS — Z01.810 PREOP CARDIOVASCULAR EXAM: ICD-10-CM

## 2024-03-16 DIAGNOSIS — Z98.890 S/P CARDIAC CATH: ICD-10-CM

## 2024-03-16 DIAGNOSIS — I10 PRIMARY HYPERTENSION: ICD-10-CM

## 2024-03-16 DIAGNOSIS — E66.9 CLASS 1 OBESITY WITH BODY MASS INDEX (BMI) OF 33.0 TO 33.9 IN ADULT, UNSPECIFIED OBESITY TYPE, UNSPECIFIED WHETHER SERIOUS COMORBIDITY PRESENT: ICD-10-CM

## 2024-03-16 DIAGNOSIS — Z95.820 S/P ANGIOPLASTY WITH STENT: ICD-10-CM

## 2024-03-16 DIAGNOSIS — R06.02 SOB (SHORTNESS OF BREATH): ICD-10-CM

## 2024-03-16 DIAGNOSIS — I25.10 ASHD (ARTERIOSCLEROTIC HEART DISEASE): ICD-10-CM

## 2024-03-16 DIAGNOSIS — I35.0 NONRHEUMATIC AORTIC (VALVE) STENOSIS: ICD-10-CM

## 2024-03-16 DIAGNOSIS — I34.0 NONRHEUMATIC MITRAL VALVE REGURGITATION: ICD-10-CM

## 2024-03-18 RX ORDER — CLOPIDOGREL BISULFATE 75 MG/1
75 TABLET ORAL DAILY
Qty: 90 TABLET | Refills: 0 | Status: SHIPPED | OUTPATIENT
Start: 2024-03-18

## 2024-03-23 DIAGNOSIS — I35.0 NONRHEUMATIC AORTIC (VALVE) STENOSIS: ICD-10-CM

## 2024-03-23 DIAGNOSIS — Z95.820 S/P ANGIOPLASTY WITH STENT: ICD-10-CM

## 2024-03-23 DIAGNOSIS — Z01.810 PREOP CARDIOVASCULAR EXAM: ICD-10-CM

## 2024-03-23 DIAGNOSIS — I25.10 ASHD (ARTERIOSCLEROTIC HEART DISEASE): ICD-10-CM

## 2024-03-23 DIAGNOSIS — I10 PRIMARY HYPERTENSION: ICD-10-CM

## 2024-03-23 DIAGNOSIS — I34.0 NONRHEUMATIC MITRAL VALVE REGURGITATION: ICD-10-CM

## 2024-03-23 DIAGNOSIS — R06.02 SOB (SHORTNESS OF BREATH): ICD-10-CM

## 2024-03-23 DIAGNOSIS — Z98.890 S/P CARDIAC CATH: ICD-10-CM

## 2024-03-23 DIAGNOSIS — E66.9 CLASS 1 OBESITY WITH BODY MASS INDEX (BMI) OF 33.0 TO 33.9 IN ADULT, UNSPECIFIED OBESITY TYPE, UNSPECIFIED WHETHER SERIOUS COMORBIDITY PRESENT: ICD-10-CM

## 2024-03-25 ENCOUNTER — HOSPITAL ENCOUNTER (OUTPATIENT)
Dept: NUCLEAR MEDICINE | Age: 69
Discharge: HOME | End: 2024-03-27
Attending: FAMILY MEDICINE
Payer: MEDICARE

## 2024-03-25 ENCOUNTER — HOSPITAL ENCOUNTER (OUTPATIENT)
Age: 69
Discharge: HOME | End: 2024-03-27
Attending: FAMILY MEDICINE
Payer: MEDICARE

## 2024-03-25 VITALS
SYSTOLIC BLOOD PRESSURE: 121 MMHG | WEIGHT: 197.97 LBS | HEIGHT: 64 IN | DIASTOLIC BLOOD PRESSURE: 61 MMHG | BODY MASS INDEX: 33.8 KG/M2

## 2024-03-25 DIAGNOSIS — I35.0 MILD AORTIC STENOSIS: ICD-10-CM

## 2024-03-25 DIAGNOSIS — R07.89 CHEST DISCOMFORT: ICD-10-CM

## 2024-03-25 DIAGNOSIS — E66.9 CLASS 1 OBESITY WITH BODY MASS INDEX (BMI) OF 33.0 TO 33.9 IN ADULT, UNSPECIFIED OBESITY TYPE, UNSPECIFIED WHETHER SERIOUS COMORBIDITY PRESENT: ICD-10-CM

## 2024-03-25 DIAGNOSIS — Z95.820 S/P ANGIOPLASTY WITH STENT: ICD-10-CM

## 2024-03-25 DIAGNOSIS — I20.9 ANGINA, CLASS III (HCC): ICD-10-CM

## 2024-03-25 DIAGNOSIS — I25.10 CORONARY ARTERY DISEASE INVOLVING NATIVE CORONARY ARTERY OF NATIVE HEART WITHOUT ANGINA PECTORIS: ICD-10-CM

## 2024-03-25 DIAGNOSIS — I10 ESSENTIAL HYPERTENSION: ICD-10-CM

## 2024-03-25 LAB
ECHO AV AREA PEAK VELOCITY: 1.3 CM2
ECHO AV AREA VTI: 1.3 CM2
ECHO AV AREA/BSA PEAK VELOCITY: 0.7 CM2/M2
ECHO AV AREA/BSA VTI: 0.7 CM2/M2
ECHO AV CUSP MM: 1.5 CM
ECHO AV MEAN GRADIENT: 12 MMHG
ECHO AV MEAN VELOCITY: 1.6 M/S
ECHO AV PEAK GRADIENT: 22 MMHG
ECHO AV PEAK VELOCITY: 2.3 M/S
ECHO AV VELOCITY RATIO: 0.48
ECHO AV VTI: 49.6 CM
ECHO BSA: 2.01 M2
ECHO EST RA PRESSURE: 3 MMHG
ECHO LA AREA 2C: 14.2 CM2
ECHO LA AREA 4C: 12 CM2
ECHO LA MAJOR AXIS: 4.5 CM
ECHO LA MINOR AXIS: 5.4 CM
ECHO LA VOL BP: 31 ML (ref 22–52)
ECHO LA VOL MOD A2C: 30 ML (ref 22–52)
ECHO LA VOL MOD A4C: 26 ML (ref 22–52)
ECHO LA VOL/BSA BIPLANE: 16 ML/M2 (ref 16–34)
ECHO LA VOLUME INDEX MOD A2C: 15 ML/M2 (ref 16–34)
ECHO LA VOLUME INDEX MOD A4C: 13 ML/M2 (ref 16–34)
ECHO LV E' LATERAL VELOCITY: 6 CM/S
ECHO LV EDV A2C: 30 ML
ECHO LV EDV A4C: 57 ML
ECHO LV EDV INDEX A4C: 29 ML/M2
ECHO LV EDV NDEX A2C: 15 ML/M2
ECHO LV EJECTION FRACTION A2C: 62 %
ECHO LV EJECTION FRACTION A4C: 56 %
ECHO LV EJECTION FRACTION BIPLANE: 58 % (ref 55–100)
ECHO LV ESV A2C: 11 ML
ECHO LV ESV A4C: 25 ML
ECHO LV ESV INDEX A2C: 6 ML/M2
ECHO LV ESV INDEX A4C: 13 ML/M2
ECHO LV FRACTIONAL SHORTENING: 34 % (ref 28–44)
ECHO LV INTERNAL DIMENSION DIASTOLE INDEX: 2.26 CM/M2
ECHO LV INTERNAL DIMENSION DIASTOLIC: 4.4 CM (ref 3.9–5.3)
ECHO LV INTERNAL DIMENSION SYSTOLIC INDEX: 1.49 CM/M2
ECHO LV INTERNAL DIMENSION SYSTOLIC: 2.9 CM
ECHO LV IVSD: 0.8 CM (ref 0.6–0.9)
ECHO LV MASS 2D: 118.6 G (ref 67–162)
ECHO LV MASS INDEX 2D: 60.8 G/M2 (ref 43–95)
ECHO LV POSTERIOR WALL DIASTOLIC: 0.9 CM (ref 0.6–0.9)
ECHO LV RELATIVE WALL THICKNESS RATIO: 0.41
ECHO LVOT AREA: 2.8 CM2
ECHO LVOT AV VTI INDEX: 0.44
ECHO LVOT DIAM: 1.9 CM
ECHO LVOT MEAN GRADIENT: 2 MMHG
ECHO LVOT PEAK GRADIENT: 4 MMHG
ECHO LVOT PEAK VELOCITY: 1.1 M/S
ECHO LVOT STROKE VOLUME INDEX: 31.7 ML/M2
ECHO LVOT SV: 61.8 ML
ECHO LVOT VTI: 21.8 CM
ECHO MV A VELOCITY: 1.32 M/S
ECHO MV E DECELERATION TIME (DT): 174 MS
ECHO MV E VELOCITY: 0.88 M/S
ECHO MV E/A RATIO: 0.67
ECHO MV E/E' LATERAL: 14.67
ECHO PV MAX VELOCITY: 1.2 M/S
ECHO PV PEAK GRADIENT: 6 MMHG
ECHO RIGHT VENTRICULAR SYSTOLIC PRESSURE (RVSP): 29 MMHG
ECHO TV REGURGITANT MAX VELOCITY: 2.54 M/S
ECHO TV REGURGITANT PEAK GRADIENT: 26 MMHG

## 2024-03-25 PROCEDURE — 3430000000 HC RX DIAGNOSTIC RADIOPHARMACEUTICAL: Performed by: FAMILY MEDICINE

## 2024-03-25 PROCEDURE — 93306 TTE W/DOPPLER COMPLETE: CPT

## 2024-03-25 PROCEDURE — 93017 CV STRESS TEST TRACING ONLY: CPT

## 2024-03-25 PROCEDURE — 93306 TTE W/DOPPLER COMPLETE: CPT | Performed by: FAMILY MEDICINE

## 2024-03-25 PROCEDURE — A9500 TC99M SESTAMIBI: HCPCS | Performed by: FAMILY MEDICINE

## 2024-03-25 RX ORDER — TETRAKIS(2-METHOXYISOBUTYLISOCYANIDE)COPPER(I) TETRAFLUOROBORATE 1 MG/ML
30 INJECTION, POWDER, LYOPHILIZED, FOR SOLUTION INTRAVENOUS
Status: COMPLETED | OUTPATIENT
Start: 2024-03-25 | End: 2024-03-25

## 2024-03-25 RX ORDER — ATORVASTATIN CALCIUM 40 MG/1
40 TABLET, FILM COATED ORAL DAILY
Qty: 90 TABLET | Refills: 0 | Status: SHIPPED | OUTPATIENT
Start: 2024-03-25

## 2024-03-25 RX ADMIN — Medication 30 MILLICURIE: at 09:44

## 2024-03-26 ENCOUNTER — HOSPITAL ENCOUNTER (OUTPATIENT)
Dept: NUCLEAR MEDICINE | Age: 69
Discharge: HOME | End: 2024-03-28
Attending: FAMILY MEDICINE
Payer: MEDICARE

## 2024-03-26 ENCOUNTER — TELEPHONE (OUTPATIENT)
Dept: CARDIOLOGY | Age: 69
End: 2024-03-26

## 2024-03-26 LAB
NUC STRESS EJECTION FRACTION: 79 %
STRESS BASELINE DIAS BP: 78 MMHG
STRESS BASELINE HR: 81 BPM
STRESS BASELINE ST DEPRESSION: 0 MM
STRESS BASELINE SYS BP: 130 MMHG
STRESS ESTIMATED WORKLOAD: 4.6 METS
STRESS EXERCISE DUR MIN: 2 MIN
STRESS EXERCISE DUR SEC: 59 SEC
STRESS PEAK DIAS BP: 80 MMHG
STRESS PEAK SYS BP: 210 MMHG
STRESS PERCENT HR ACHIEVED: 89 %
STRESS POST PEAK HR: 134 BPM
STRESS RATE PRESSURE PRODUCT: NORMAL BPM*MMHG
STRESS STAGE RECOVERY 1 BP: NORMAL MMHG
STRESS STAGE RECOVERY 1 DURATION: 0 MIN:SEC
STRESS STAGE RECOVERY 1 HR: 127 BPM
STRESS STAGE RECOVERY 2 DURATION: 1 MIN:SEC
STRESS STAGE RECOVERY 2 HR: 98 BPM
STRESS STAGE RECOVERY 3 BP: NORMAL MMHG
STRESS STAGE RECOVERY 3 DURATION: 3 MIN:SEC
STRESS STAGE RECOVERY 3 HR: 96 BPM
STRESS STAGE RECOVERY 4 BP: NORMAL MMHG
STRESS STAGE RECOVERY 4 DURATION: 5 MIN:SEC
STRESS STAGE RECOVERY 4 HR: 96 BPM
STRESS TARGET HR: 151 BPM
TID: 1.03

## 2024-03-26 PROCEDURE — 78452 HT MUSCLE IMAGE SPECT MULT: CPT | Performed by: INTERNAL MEDICINE

## 2024-03-26 PROCEDURE — 93016 CV STRESS TEST SUPVJ ONLY: CPT | Performed by: INTERNAL MEDICINE

## 2024-03-26 PROCEDURE — A9500 TC99M SESTAMIBI: HCPCS | Performed by: FAMILY MEDICINE

## 2024-03-26 PROCEDURE — 3430000000 HC RX DIAGNOSTIC RADIOPHARMACEUTICAL: Performed by: FAMILY MEDICINE

## 2024-03-26 PROCEDURE — 93018 CV STRESS TEST I&R ONLY: CPT | Performed by: INTERNAL MEDICINE

## 2024-03-26 RX ORDER — TETRAKIS(2-METHOXYISOBUTYLISOCYANIDE)COPPER(I) TETRAFLUOROBORATE 1 MG/ML
30 INJECTION, POWDER, LYOPHILIZED, FOR SOLUTION INTRAVENOUS
Status: COMPLETED | OUTPATIENT
Start: 2024-03-26 | End: 2024-03-26

## 2024-03-26 RX ADMIN — Medication 30 MILLICURIE: at 08:07

## 2024-03-26 NOTE — TELEPHONE ENCOUNTER
----- Message from Dk Jefferson MD sent at 3/25/2024  4:37 PM EDT -----  Let Ms. Mcknight know their test result was ok. Will discuss at next visit. Thanks.

## 2024-03-27 ENCOUNTER — TELEPHONE (OUTPATIENT)
Dept: CARDIOLOGY | Age: 69
End: 2024-03-27

## 2024-03-27 NOTE — RESULT ENCOUNTER NOTE
Please let Ms. Mcknight know that her test was abnormal and please make them an appointment in 2 WEEKS if not already done. Thanks.    Ronny

## 2024-03-27 NOTE — TELEPHONE ENCOUNTER
----- Message from Dk Jefferson MD sent at 3/27/2024  1:30 AM EDT -----  Please let Ms. Mcknight know that her test was abnormal and please make them an appointment in 2 WEEKS if not already done. Thanks.    Ronny

## 2024-04-12 ENCOUNTER — OFFICE VISIT (OUTPATIENT)
Dept: CARDIOLOGY | Age: 69
End: 2024-04-12
Payer: MEDICARE

## 2024-04-12 ENCOUNTER — HOSPITAL ENCOUNTER (OUTPATIENT)
Age: 69
Discharge: HOME | End: 2024-04-12
Payer: MEDICARE

## 2024-04-12 ENCOUNTER — HOSPITAL ENCOUNTER (OUTPATIENT)
Age: 69
End: 2024-04-12
Payer: MEDICARE

## 2024-04-12 VITALS
SYSTOLIC BLOOD PRESSURE: 115 MMHG | BODY MASS INDEX: 33.63 KG/M2 | WEIGHT: 197 LBS | HEIGHT: 64 IN | HEART RATE: 68 BPM | RESPIRATION RATE: 18 BRPM | DIASTOLIC BLOOD PRESSURE: 72 MMHG | OXYGEN SATURATION: 98 %

## 2024-04-12 DIAGNOSIS — I25.10 CORONARY ARTERY DISEASE INVOLVING NATIVE CORONARY ARTERY OF NATIVE HEART WITHOUT ANGINA PECTORIS: ICD-10-CM

## 2024-04-12 DIAGNOSIS — Z95.820 S/P ANGIOPLASTY WITH STENT: ICD-10-CM

## 2024-04-12 DIAGNOSIS — R94.39 ABNORMAL STRESS TEST: ICD-10-CM

## 2024-04-12 DIAGNOSIS — E66.9 CLASS 1 OBESITY WITH BODY MASS INDEX (BMI) OF 33.0 TO 33.9 IN ADULT, UNSPECIFIED OBESITY TYPE, UNSPECIFIED WHETHER SERIOUS COMORBIDITY PRESENT: ICD-10-CM

## 2024-04-12 DIAGNOSIS — I10 ESSENTIAL HYPERTENSION: ICD-10-CM

## 2024-04-12 DIAGNOSIS — R07.89 CHEST DISCOMFORT: ICD-10-CM

## 2024-04-12 DIAGNOSIS — I35.0 MILD AORTIC STENOSIS: ICD-10-CM

## 2024-04-12 LAB
ANION GAP SERPL CALCULATED.3IONS-SCNC: 10 MMOL/L (ref 9–17)
BUN SERPL-MCNC: 11 MG/DL (ref 8–23)
BUN/CREAT SERPL: 16 (ref 9–20)
CALCIUM SERPL-MCNC: 9.3 MG/DL (ref 8.6–10.4)
CHLORIDE SERPL-SCNC: 103 MMOL/L (ref 98–107)
CO2 SERPL-SCNC: 30 MMOL/L (ref 20–31)
CREAT SERPL-MCNC: 0.7 MG/DL (ref 0.5–0.9)
ECHO BSA: 2.01 M2
ERYTHROCYTE [DISTWIDTH] IN BLOOD BY AUTOMATED COUNT: 12.3 % (ref 11.8–14.4)
GFR SERPL CREATININE-BSD FRML MDRD: >90 ML/MIN/1.73M2
GLUCOSE SERPL-MCNC: 106 MG/DL (ref 70–99)
HCT VFR BLD AUTO: 43.9 % (ref 36.3–47.1)
HGB BLD-MCNC: 14.5 G/DL (ref 11.9–15.1)
MCH RBC QN AUTO: 29.7 PG (ref 25.2–33.5)
MCHC RBC AUTO-ENTMCNC: 33 G/DL (ref 28.4–34.8)
MCV RBC AUTO: 89.8 FL (ref 82.6–102.9)
NRBC BLD-RTO: 0 PER 100 WBC
PLATELET # BLD AUTO: 215 K/UL (ref 138–453)
PMV BLD AUTO: 9.7 FL (ref 8.1–13.5)
POTASSIUM SERPL-SCNC: 4 MMOL/L (ref 3.7–5.3)
RBC # BLD AUTO: 4.89 M/UL (ref 3.95–5.11)
SODIUM SERPL-SCNC: 143 MMOL/L (ref 135–144)
WBC OTHER # BLD: 7 K/UL (ref 3.5–11.3)

## 2024-04-12 PROCEDURE — 1123F ACP DISCUSS/DSCN MKR DOCD: CPT | Performed by: PHYSICIAN ASSISTANT

## 2024-04-12 PROCEDURE — 3074F SYST BP LT 130 MM HG: CPT | Performed by: PHYSICIAN ASSISTANT

## 2024-04-12 PROCEDURE — 36415 COLL VENOUS BLD VENIPUNCTURE: CPT

## 2024-04-12 PROCEDURE — 93243 EXT ECG>48HR<7D SCAN A/R: CPT

## 2024-04-12 PROCEDURE — 85027 COMPLETE CBC AUTOMATED: CPT

## 2024-04-12 PROCEDURE — 3078F DIAST BP <80 MM HG: CPT | Performed by: PHYSICIAN ASSISTANT

## 2024-04-12 PROCEDURE — 99214 OFFICE O/P EST MOD 30 MIN: CPT | Performed by: PHYSICIAN ASSISTANT

## 2024-04-12 PROCEDURE — 80048 BASIC METABOLIC PNL TOTAL CA: CPT

## 2024-04-12 RX ORDER — METOPROLOL SUCCINATE 25 MG/1
25 TABLET, EXTENDED RELEASE ORAL DAILY
Qty: 30 TABLET | Refills: 3 | Status: SHIPPED | OUTPATIENT
Start: 2024-04-12

## 2024-04-12 NOTE — PROGRESS NOTES
I, Gabrielle Lyons am scribing for and in the presence of Sulema Skelton PA-C.    Subjective:     CHIEF COMPLAINT / HPI:    Chief Complaint   Patient presents with    Coronary Artery Disease       HX: CAD S/p Stent, HTN. Pt is here for abn stress she states she feels ok, CP on and off, tightness, pressure, once time when laying down stabbing pain lasted a second, sob with exertion, lightheaded/dizziness denies any falls, palp    Echo & Stress on 3/25/2024.      Dear Stephania Ryan, APRN - CNP,    I had the pleasure of seeing Annabel Mcknight in consultation today.    Annabel Mcknight is 68 y.o. female with past medical history of hyperlipidemia, hypertension and mitral regurgitation. No history of coronary artery disease, myocardial infarction, heart failure or significant arrhythmia. Her father had heart disease starting at 55 years old. She is former smoker, 1 ppk a week. Negative stress Echo in 2015. Her risk factors for coronary artery disease includes hypertension, hyperlipidemia and family history of premature coronary artery disease. ECG done in office 9/17/2019- Normal sinus rhythm, normal ECG. Echo done on 8/6/2018- EF >55%. The LV wall thickness is mildly incrased. Moderate mitral regurg. Mild aortic stenosis. Mild aortic regurg. Mild tricuspid regurg. Evidence of mild (grade I) diastolic dysfucntion is seen.     Echo done on 2/22/23: Global left ventricular systolic function appears preserved with an estimated ejection fraction of 60%. The left ventricular cavity size is within normal limits and the left ventricular wall thickness is mildly increased. Aortic leaflet calcification with mild aortic stenosis with a mean gradient of 14 mmHg. Mild aortic regurgitation was seen. Mild to moderate posteriorly directed mitral regurgitation was seen. Mild pulmonary hypertension with an estimated right ventricular systolic pressure of 37 mmHg. Mild to moderate tricuspid regurgitation. Evidence of moderate

## 2024-04-15 ENCOUNTER — TELEPHONE (OUTPATIENT)
Dept: CARDIOLOGY | Age: 69
End: 2024-04-15

## 2024-04-15 NOTE — TELEPHONE ENCOUNTER
----- Message from Sulema Skelton PA-C sent at 4/15/2024  8:00 AM EDT -----  Please notify patient that their lab results are normal.   Please continue current treatment and follow up.

## 2024-04-18 ENCOUNTER — HOSPITAL ENCOUNTER (OUTPATIENT)
Age: 69
Setting detail: OUTPATIENT SURGERY
Discharge: HOME | End: 2024-04-18
Attending: FAMILY MEDICINE | Admitting: FAMILY MEDICINE
Payer: MEDICARE

## 2024-04-18 VITALS
SYSTOLIC BLOOD PRESSURE: 137 MMHG | OXYGEN SATURATION: 97 % | DIASTOLIC BLOOD PRESSURE: 52 MMHG | HEART RATE: 69 BPM | RESPIRATION RATE: 12 BRPM | TEMPERATURE: 97.2 F

## 2024-04-18 DIAGNOSIS — R94.39 ABNORMAL CARDIOVASCULAR STRESS TEST: ICD-10-CM

## 2024-04-18 PROCEDURE — 7100000011 HC PHASE II RECOVERY - ADDTL 15 MIN: Performed by: FAMILY MEDICINE

## 2024-04-18 PROCEDURE — 6360000002 HC RX W HCPCS: Performed by: FAMILY MEDICINE

## 2024-04-18 PROCEDURE — 2500000003 HC RX 250 WO HCPCS: Performed by: FAMILY MEDICINE

## 2024-04-18 PROCEDURE — C1769 GUIDE WIRE: HCPCS | Performed by: FAMILY MEDICINE

## 2024-04-18 PROCEDURE — 99152 MOD SED SAME PHYS/QHP 5/>YRS: CPT | Performed by: FAMILY MEDICINE

## 2024-04-18 PROCEDURE — 2709999900 HC NON-CHARGEABLE SUPPLY: Performed by: FAMILY MEDICINE

## 2024-04-18 PROCEDURE — 99153 MOD SED SAME PHYS/QHP EA: CPT | Performed by: FAMILY MEDICINE

## 2024-04-18 PROCEDURE — 7100000010 HC PHASE II RECOVERY - FIRST 15 MIN: Performed by: FAMILY MEDICINE

## 2024-04-18 PROCEDURE — 93458 L HRT ARTERY/VENTRICLE ANGIO: CPT | Performed by: FAMILY MEDICINE

## 2024-04-18 PROCEDURE — 6360000004 HC RX CONTRAST MEDICATION: Performed by: FAMILY MEDICINE

## 2024-04-18 PROCEDURE — C1894 INTRO/SHEATH, NON-LASER: HCPCS | Performed by: FAMILY MEDICINE

## 2024-04-18 PROCEDURE — 2580000003 HC RX 258: Performed by: FAMILY MEDICINE

## 2024-04-18 RX ORDER — SODIUM CHLORIDE 0.9 % (FLUSH) 0.9 %
5-40 SYRINGE (ML) INJECTION EVERY 12 HOURS SCHEDULED
Status: DISCONTINUED | OUTPATIENT
Start: 2024-04-18 | End: 2024-04-18 | Stop reason: HOSPADM

## 2024-04-18 RX ORDER — MIDAZOLAM HYDROCHLORIDE 1 MG/ML
INJECTION INTRAMUSCULAR; INTRAVENOUS PRN
Status: DISCONTINUED | OUTPATIENT
Start: 2024-04-18 | End: 2024-04-18 | Stop reason: HOSPADM

## 2024-04-18 RX ORDER — VERAPAMIL HYDROCHLORIDE 2.5 MG/ML
INJECTION, SOLUTION INTRAVENOUS PRN
Status: DISCONTINUED | OUTPATIENT
Start: 2024-04-18 | End: 2024-04-18 | Stop reason: HOSPADM

## 2024-04-18 RX ORDER — DIPHENHYDRAMINE HCL 25 MG
50 CAPSULE ORAL ONCE
Status: DISCONTINUED | OUTPATIENT
Start: 2024-04-18 | End: 2024-04-18 | Stop reason: HOSPADM

## 2024-04-18 RX ORDER — LIDOCAINE HYDROCHLORIDE 10 MG/ML
INJECTION, SOLUTION INFILTRATION; PERINEURAL PRN
Status: DISCONTINUED | OUTPATIENT
Start: 2024-04-18 | End: 2024-04-18 | Stop reason: HOSPADM

## 2024-04-18 RX ORDER — NITROGLYCERIN 0.4 MG/1
0.4 TABLET SUBLINGUAL EVERY 5 MIN PRN
Status: DISCONTINUED | OUTPATIENT
Start: 2024-04-18 | End: 2024-04-18 | Stop reason: HOSPADM

## 2024-04-18 RX ORDER — SODIUM CHLORIDE 9 MG/ML
INJECTION, SOLUTION INTRAVENOUS PRN
Status: DISCONTINUED | OUTPATIENT
Start: 2024-04-18 | End: 2024-04-18 | Stop reason: HOSPADM

## 2024-04-18 RX ORDER — HEPARIN SODIUM 1000 [USP'U]/ML
INJECTION, SOLUTION INTRAVENOUS; SUBCUTANEOUS PRN
Status: DISCONTINUED | OUTPATIENT
Start: 2024-04-18 | End: 2024-04-18 | Stop reason: HOSPADM

## 2024-04-18 RX ORDER — NITROGLYCERIN 20 MG/100ML
INJECTION INTRAVENOUS PRN
Status: DISCONTINUED | OUTPATIENT
Start: 2024-04-18 | End: 2024-04-18 | Stop reason: HOSPADM

## 2024-04-18 RX ORDER — SODIUM CHLORIDE 0.9 % (FLUSH) 0.9 %
5-40 SYRINGE (ML) INJECTION PRN
Status: DISCONTINUED | OUTPATIENT
Start: 2024-04-18 | End: 2024-04-18 | Stop reason: HOSPADM

## 2024-04-18 RX ORDER — ACETAMINOPHEN 325 MG/1
650 TABLET ORAL EVERY 4 HOURS PRN
Status: DISCONTINUED | OUTPATIENT
Start: 2024-04-18 | End: 2024-04-18 | Stop reason: HOSPADM

## 2024-04-18 RX ORDER — SODIUM CHLORIDE 9 MG/ML
INJECTION, SOLUTION INTRAVENOUS CONTINUOUS
Status: DISCONTINUED | OUTPATIENT
Start: 2024-04-18 | End: 2024-04-18 | Stop reason: HOSPADM

## 2024-04-18 RX ADMIN — SODIUM CHLORIDE: 9 INJECTION, SOLUTION INTRAVENOUS at 11:33

## 2024-04-18 NOTE — PROGRESS NOTES
Inpatients must meet criteria 1 through 7.   1. Minimum 30 minutes after last dose of sedative medication, minimum 120 minutes after last dose of reversal agent.   Yes   2. Systolic BP stable within 20 mmHg for 30 minutes & systolic BP between 90 & 180 or within 10 mmHg of baseline.   Yes   3. Pulse between 60 and 100 or within 10 bpm of baseline.   Yes   4. Spontaneous respiratory rate >/= 10 per minute.   Yes   5. SaO2 >/= 95 or >/= baseline.   Yes   6. Able to cough and swallow or return to baseline function.   Yes   7. Alert and oriented or return to baseline mental status.   Yes   8. Demonstrates controlled, coordinated movements, ambulates with steady gait, or return to baseline activity function.   Yes   9. Minimal or no pain or nausea, or at a level tolerable and acceptable to patient.   Yes   10. Takes and retains oral fluids as allowed.   Yes   11. Procedural / perioperative site stable. Minimal or no bleeding.   Yes   12. If GI endoscopy procedure, minimal or no abdominal distention or passing flatus.   N/A   13. Written discharge instructions and emergency telephone number provided.   Yes   14. Accompanied by a responsible adult.   Yes   Adult patient discharged from facility without responsible person meets above criteria plus the following:   a) remains awake without stimulus for 30 minutes   b) oriented appropriate for age   c) all vital signs stable   d) no significant risk of losing protective reflexes   e) able to maintain pre-procedure mobility without assistance   f) no nausea or dizziness   g) transportation arrangements that do not require patient to operate motor Vehicle.   Yes

## 2024-04-18 NOTE — DISCHARGE INSTRUCTIONS
Discharge Instructions for Cardiac Catheterization    A cardiac catheterization is a diagnostic test used to evaluate the health of the heart and its blood vessels. The test is done with a thin catheter carefully threaded into your heart from a leg or arm artery. Most likely, you will be allowed to go home the same day as the procedure.   Steps to Take at Home:   Pain- apply ice to site 15-20 minutes every hour for the first 2 days.   Showering is okay 24 hours after procedure.    No soaking in a pool, hot tub, bath tub, or standing water for one week.   Bleeding (outward or under the skin-hematoma)- apply firm pressure for 10-15 minutes or until the bleeding stops, then call your doctor. If unable to get bleeding stopped, call 911.    Kidney damage- Call if you urinate less than normal, have swelling or feel puffy, and/or gain 2 or more pounds over night in the first week.       *** If hand feels numb, tingly, and/or cold- loosen first 1-2 layers of tape if dressing still in place.  If no relief noticed, remove pressure dressing as per above instructions. Seek medical help if no relief or if dressing already off.    TR Band- After first 30 minutes, pressure will be released from    device every 15 until completely decompressed.  Inflation tube    may be cut prior to discharge but band left in place.   **Band can be removed after 4 hours:    TIME __________7:00 pm____________________  *** If hand of procedure site becomes numb, tingly, and/or cold, seek medical help.    Diet   Drink plenty of fluids after the test to flush the x-ray dye from your system.   Return to your normal diet.   No alcoholic beverages for 24 hours after the procedure.  Physical Activity   The sedative will make you sleepy. Rest until the effects have worn off.   Nausea and vomiting from the sedative is normal and usually does not last long.  Ask your doctor when you will be able to return to work.   Do not drive, operate machinery, do

## 2024-04-21 LAB — ECHO BSA: 2.01 M2

## 2024-04-22 ENCOUNTER — TELEPHONE (OUTPATIENT)
Dept: CARDIOLOGY | Age: 69
End: 2024-04-22

## 2024-04-22 ENCOUNTER — HOSPITAL ENCOUNTER (OUTPATIENT)
Age: 69
Discharge: HOME | End: 2024-04-22
Payer: MEDICARE

## 2024-04-22 DIAGNOSIS — I10 ESSENTIAL HYPERTENSION: ICD-10-CM

## 2024-04-22 DIAGNOSIS — R73.03 PREDIABETES: ICD-10-CM

## 2024-04-22 DIAGNOSIS — Z11.59 NEED FOR HEPATITIS C SCREENING TEST: ICD-10-CM

## 2024-04-22 DIAGNOSIS — E78.5 HYPERLIPIDEMIA, UNSPECIFIED HYPERLIPIDEMIA TYPE: ICD-10-CM

## 2024-04-22 LAB
ALBUMIN SERPL-MCNC: 3.7 G/DL (ref 3.5–5.2)
ALBUMIN/GLOB SERPL: 1.1 {RATIO} (ref 1–2.5)
ALP SERPL-CCNC: 98 U/L (ref 35–104)
ALT SERPL-CCNC: 22 U/L (ref 5–33)
ANION GAP SERPL CALCULATED.3IONS-SCNC: 11 MMOL/L (ref 9–17)
AST SERPL-CCNC: 17 U/L
BILIRUB SERPL-MCNC: 0.5 MG/DL (ref 0.3–1.2)
BUN SERPL-MCNC: 10 MG/DL (ref 8–23)
BUN/CREAT SERPL: 14 (ref 9–20)
CALCIUM SERPL-MCNC: 9.2 MG/DL (ref 8.6–10.4)
CHLORIDE SERPL-SCNC: 102 MMOL/L (ref 98–107)
CHOLEST SERPL-MCNC: 116 MG/DL (ref 0–199)
CHOLESTEROL/HDL RATIO: 3
CO2 SERPL-SCNC: 28 MMOL/L (ref 20–31)
CREAT SERPL-MCNC: 0.7 MG/DL (ref 0.5–0.9)
ERYTHROCYTE [DISTWIDTH] IN BLOOD BY AUTOMATED COUNT: 12.3 % (ref 11.8–14.4)
EST. AVERAGE GLUCOSE BLD GHB EST-MCNC: 120 MG/DL
GFR SERPL CREATININE-BSD FRML MDRD: >90 ML/MIN/1.73M2
GLUCOSE SERPL-MCNC: 90 MG/DL (ref 70–99)
HBA1C MFR BLD: 5.8 % (ref 4–6)
HCT VFR BLD AUTO: 43.2 % (ref 36.3–47.1)
HCV AB SERPL QL IA: NONREACTIVE
HDLC SERPL-MCNC: 43 MG/DL
HGB BLD-MCNC: 14 G/DL (ref 11.9–15.1)
LDLC SERPL CALC-MCNC: 53 MG/DL (ref 0–100)
MCH RBC QN AUTO: 29.2 PG (ref 25.2–33.5)
MCHC RBC AUTO-ENTMCNC: 32.4 G/DL (ref 28.4–34.8)
MCV RBC AUTO: 90.2 FL (ref 82.6–102.9)
NRBC BLD-RTO: 0 PER 100 WBC
PLATELET # BLD AUTO: 229 K/UL (ref 138–453)
PMV BLD AUTO: 10 FL (ref 8.1–13.5)
POTASSIUM SERPL-SCNC: 4.1 MMOL/L (ref 3.7–5.3)
PROT SERPL-MCNC: 7.1 G/DL (ref 6.4–8.3)
RBC # BLD AUTO: 4.79 M/UL (ref 3.95–5.11)
SODIUM SERPL-SCNC: 141 MMOL/L (ref 135–144)
TRIGL SERPL-MCNC: 100 MG/DL
VLDLC SERPL CALC-MCNC: 20 MG/DL
WBC OTHER # BLD: 7.2 K/UL (ref 3.5–11.3)

## 2024-04-22 PROCEDURE — 80053 COMPREHEN METABOLIC PANEL: CPT

## 2024-04-22 PROCEDURE — 85027 COMPLETE CBC AUTOMATED: CPT

## 2024-04-22 PROCEDURE — 86803 HEPATITIS C AB TEST: CPT

## 2024-04-22 PROCEDURE — 36415 COLL VENOUS BLD VENIPUNCTURE: CPT

## 2024-04-22 PROCEDURE — 80061 LIPID PANEL: CPT

## 2024-04-22 PROCEDURE — 83036 HEMOGLOBIN GLYCOSYLATED A1C: CPT

## 2024-04-22 NOTE — TELEPHONE ENCOUNTER
----- Message from Sulema Skelton PA-C sent at 4/22/2024  8:04 AM EDT -----  Please let them know that their CAM monitor was overall unremarkable. We will discuss at their follow up appointment.

## 2024-04-25 ENCOUNTER — OFFICE VISIT (OUTPATIENT)
Dept: PRIMARY CARE CLINIC | Age: 69
End: 2024-04-25
Payer: MEDICARE

## 2024-04-25 VITALS
DIASTOLIC BLOOD PRESSURE: 76 MMHG | HEART RATE: 86 BPM | TEMPERATURE: 97.3 F | HEIGHT: 64 IN | BODY MASS INDEX: 33.7 KG/M2 | WEIGHT: 197.4 LBS | RESPIRATION RATE: 16 BRPM | SYSTOLIC BLOOD PRESSURE: 140 MMHG | OXYGEN SATURATION: 99 %

## 2024-04-25 DIAGNOSIS — I20.9 ANGINAL PAIN (HCC): Primary | ICD-10-CM

## 2024-04-25 PROCEDURE — 1123F ACP DISCUSS/DSCN MKR DOCD: CPT | Performed by: NURSE PRACTITIONER

## 2024-04-25 PROCEDURE — 3077F SYST BP >= 140 MM HG: CPT | Performed by: NURSE PRACTITIONER

## 2024-04-25 PROCEDURE — 99214 OFFICE O/P EST MOD 30 MIN: CPT | Performed by: NURSE PRACTITIONER

## 2024-04-25 PROCEDURE — 3078F DIAST BP <80 MM HG: CPT | Performed by: NURSE PRACTITIONER

## 2024-04-25 RX ORDER — METOPROLOL SUCCINATE 25 MG/1
50 TABLET, EXTENDED RELEASE ORAL DAILY
Qty: 30 TABLET | Refills: 3
Start: 2024-04-25

## 2024-04-25 SDOH — ECONOMIC STABILITY: FOOD INSECURITY: WITHIN THE PAST 12 MONTHS, YOU WORRIED THAT YOUR FOOD WOULD RUN OUT BEFORE YOU GOT MONEY TO BUY MORE.: NEVER TRUE

## 2024-04-25 SDOH — ECONOMIC STABILITY: FOOD INSECURITY: WITHIN THE PAST 12 MONTHS, THE FOOD YOU BOUGHT JUST DIDN'T LAST AND YOU DIDN'T HAVE MONEY TO GET MORE.: NEVER TRUE

## 2024-04-25 SDOH — ECONOMIC STABILITY: INCOME INSECURITY: HOW HARD IS IT FOR YOU TO PAY FOR THE VERY BASICS LIKE FOOD, HOUSING, MEDICAL CARE, AND HEATING?: NOT HARD AT ALL

## 2024-04-25 ASSESSMENT — ENCOUNTER SYMPTOMS: SHORTNESS OF BREATH: 1

## 2024-04-25 NOTE — PROGRESS NOTES
Name: Annabel Mcknight  : 1955         Chief Complaint:     Chief Complaint   Patient presents with    Follow-Up from Hospital     Patient recently had a heart cath on 24 with Dr. Jefferson. She states she isnt feeling best. She reports she has SOB and having some chest pain upon exertion, especially while laying down at night. She c/o feeling very fatigue. She has surgery scheduled for stents to be placed at [facility] on 24.        History of Present Illness:      Annabel Mcknight is a 68 y.o.  female who presents with Follow-Up from Hospital (Patient recently had a heart cath on 24 with Dr. Jefferson. She states she isnt feeling best. She reports she has SOB and having some chest pain upon exertion, especially while laying down at night. She c/o feeling very fatigue. She has surgery scheduled for stents to be placed at [facility] on 24. )      HPI    Patient presents for follow-up.  She did undergo cardiac catheterization 2024 with Dr. Jefferson ([facility]). Findings not yet available for review. Today, she states she feels fired. She is feeling more SOB when compared to her baseline. She admits feeling \"off\". She admits chest tightness in the center of her chest, worse than her baseline, worse with exertion. Admits sweating with exertion. Denies jaw pain. She does have some left upper arm pain that started 2 months ago. She admits feeling about the same as she did prior to the heart cath. She is scheduled for cardiac stent placement tomorrow at [facility] tomorrow 24.     Past Medical History:     Past Medical History:   Diagnosis Date    Allergic rhinitis     Aphthous ulcer of mouth     CAD (coronary artery disease)     H/O echocardiogram 2015    EF 65%. Aortic valve sclerosis without stenosis. Mild aortic regurgitation.  Myxomatous thickening of the mitral leaflets with Moderate mitral regurgitation. Mils (grade l) diastolic dysfunction.

## 2024-04-26 ENCOUNTER — HOSPITAL ENCOUNTER (OUTPATIENT)
Age: 69
Setting detail: OUTPATIENT SURGERY
Discharge: HOME | End: 2024-04-26
Attending: INTERNAL MEDICINE | Admitting: INTERNAL MEDICINE
Payer: MEDICARE

## 2024-04-26 VITALS
BODY MASS INDEX: 33.46 KG/M2 | HEART RATE: 57 BPM | TEMPERATURE: 98 F | WEIGHT: 196 LBS | SYSTOLIC BLOOD PRESSURE: 127 MMHG | DIASTOLIC BLOOD PRESSURE: 56 MMHG | RESPIRATION RATE: 13 BRPM | OXYGEN SATURATION: 97 % | HEIGHT: 64 IN

## 2024-04-26 DIAGNOSIS — Z98.890 S/P CARDIAC CATH: ICD-10-CM

## 2024-04-26 DIAGNOSIS — I35.0 NONRHEUMATIC AORTIC (VALVE) STENOSIS: ICD-10-CM

## 2024-04-26 DIAGNOSIS — E66.9 CLASS 1 OBESITY WITH BODY MASS INDEX (BMI) OF 33.0 TO 33.9 IN ADULT, UNSPECIFIED OBESITY TYPE, UNSPECIFIED WHETHER SERIOUS COMORBIDITY PRESENT: ICD-10-CM

## 2024-04-26 DIAGNOSIS — I25.119 CORONARY ARTERY DISEASE WITH ANGINA PECTORIS, UNSPECIFIED VESSEL OR LESION TYPE, UNSPECIFIED WHETHER NATIVE OR TRANSPLANTED HEART (HCC): ICD-10-CM

## 2024-04-26 DIAGNOSIS — Z95.820 S/P ANGIOPLASTY WITH STENT: ICD-10-CM

## 2024-04-26 DIAGNOSIS — I10 PRIMARY HYPERTENSION: ICD-10-CM

## 2024-04-26 DIAGNOSIS — I25.10 ASHD (ARTERIOSCLEROTIC HEART DISEASE): ICD-10-CM

## 2024-04-26 DIAGNOSIS — I34.0 NONRHEUMATIC MITRAL VALVE REGURGITATION: ICD-10-CM

## 2024-04-26 DIAGNOSIS — Z01.810 PREOP CARDIOVASCULAR EXAM: ICD-10-CM

## 2024-04-26 DIAGNOSIS — R06.02 SOB (SHORTNESS OF BREATH): ICD-10-CM

## 2024-04-26 LAB — ECHO BSA: 2 M2

## 2024-04-26 PROCEDURE — 2580000003 HC RX 258: Performed by: INTERNAL MEDICINE

## 2024-04-26 PROCEDURE — 7100000010 HC PHASE II RECOVERY - FIRST 15 MIN: Performed by: INTERNAL MEDICINE

## 2024-04-26 PROCEDURE — 99152 MOD SED SAME PHYS/QHP 5/>YRS: CPT | Performed by: INTERNAL MEDICINE

## 2024-04-26 PROCEDURE — 99153 MOD SED SAME PHYS/QHP EA: CPT | Performed by: INTERNAL MEDICINE

## 2024-04-26 PROCEDURE — C1769 GUIDE WIRE: HCPCS | Performed by: INTERNAL MEDICINE

## 2024-04-26 PROCEDURE — 7100000011 HC PHASE II RECOVERY - ADDTL 15 MIN: Performed by: INTERNAL MEDICINE

## 2024-04-26 PROCEDURE — C9600 PERC DRUG-EL COR STENT SING: HCPCS | Performed by: INTERNAL MEDICINE

## 2024-04-26 PROCEDURE — 2500000003 HC RX 250 WO HCPCS: Performed by: INTERNAL MEDICINE

## 2024-04-26 PROCEDURE — C9601 PERC DRUG-EL COR STENT BRAN: HCPCS | Performed by: INTERNAL MEDICINE

## 2024-04-26 PROCEDURE — 2709999900 HC NON-CHARGEABLE SUPPLY: Performed by: INTERNAL MEDICINE

## 2024-04-26 PROCEDURE — 92928 PRQ TCAT PLMT NTRAC ST 1 LES: CPT | Performed by: INTERNAL MEDICINE

## 2024-04-26 PROCEDURE — C1760 CLOSURE DEV, VASC: HCPCS | Performed by: INTERNAL MEDICINE

## 2024-04-26 PROCEDURE — 6360000002 HC RX W HCPCS: Performed by: INTERNAL MEDICINE

## 2024-04-26 PROCEDURE — 93458 L HRT ARTERY/VENTRICLE ANGIO: CPT | Performed by: INTERNAL MEDICINE

## 2024-04-26 PROCEDURE — C1725 CATH, TRANSLUMIN NON-LASER: HCPCS | Performed by: INTERNAL MEDICINE

## 2024-04-26 PROCEDURE — 6360000004 HC RX CONTRAST MEDICATION: Performed by: INTERNAL MEDICINE

## 2024-04-26 PROCEDURE — C1874 STENT, COATED/COV W/DEL SYS: HCPCS | Performed by: INTERNAL MEDICINE

## 2024-04-26 PROCEDURE — C1887 CATHETER, GUIDING: HCPCS | Performed by: INTERNAL MEDICINE

## 2024-04-26 DEVICE — STENT ONYXNG25038UX ONYX 2.50X38RX
Type: IMPLANTABLE DEVICE | Status: FUNCTIONAL
Brand: ONYX FRONTIER™

## 2024-04-26 DEVICE — STENT ONYXNG25008UX ONYX 2.50X08RX
Type: IMPLANTABLE DEVICE | Status: FUNCTIONAL
Brand: ONYX FRONTIER™

## 2024-04-26 RX ORDER — SODIUM CHLORIDE 9 MG/ML
INJECTION, SOLUTION INTRAVENOUS CONTINUOUS
Status: DISCONTINUED | OUTPATIENT
Start: 2024-04-26 | End: 2024-04-26 | Stop reason: HOSPADM

## 2024-04-26 RX ORDER — ATORVASTATIN CALCIUM 40 MG/1
80 TABLET, FILM COATED ORAL NIGHTLY
Qty: 30 TABLET | Refills: 3 | Status: SHIPPED | OUTPATIENT
Start: 2024-04-26 | End: 2024-04-26

## 2024-04-26 RX ORDER — BIVALIRUDIN 250 MG/5ML
INJECTION, POWDER, LYOPHILIZED, FOR SOLUTION INTRAVENOUS PRN
Status: DISCONTINUED | OUTPATIENT
Start: 2024-04-26 | End: 2024-04-26 | Stop reason: HOSPADM

## 2024-04-26 RX ORDER — ATORVASTATIN CALCIUM 80 MG/1
80 TABLET, FILM COATED ORAL NIGHTLY
Qty: 30 TABLET | Refills: 3 | Status: SHIPPED | OUTPATIENT
Start: 2024-04-26 | End: 2024-04-30 | Stop reason: SDUPTHER

## 2024-04-26 RX ORDER — ATORVASTATIN CALCIUM 80 MG/1
80 TABLET, FILM COATED ORAL NIGHTLY
Qty: 30 TABLET | Refills: 3 | Status: SHIPPED | OUTPATIENT
Start: 2024-04-26 | End: 2024-04-26

## 2024-04-26 RX ORDER — MIDAZOLAM HYDROCHLORIDE 1 MG/ML
INJECTION INTRAMUSCULAR; INTRAVENOUS PRN
Status: DISCONTINUED | OUTPATIENT
Start: 2024-04-26 | End: 2024-04-26 | Stop reason: HOSPADM

## 2024-04-26 RX ORDER — NITROGLYCERIN 20 MG/100ML
INJECTION INTRAVENOUS PRN
Status: DISCONTINUED | OUTPATIENT
Start: 2024-04-26 | End: 2024-04-26 | Stop reason: HOSPADM

## 2024-04-26 RX ADMIN — SODIUM CHLORIDE: 9 INJECTION, SOLUTION INTRAVENOUS at 11:04

## 2024-04-26 NOTE — H&P
[facility]   History & Physical               Today's Date: 4/26/2024  Patient Name: Annabel Mcknight  Date of admission: No admission date for patient encounter.  Patient's age: 68 y.o., 1955  Admission Dx: Coronary artery disease with angina pectoris, unspecified vessel or lesion type, unspecified whether native or transplanted heart (HCC) [I25.119]    Reason for Admission:  PCI    CHIEF COMPLAINT:  abnormal stress test    History Obtained From:  patient, electronic medical record    HISTORY OF PRESENT ILLNESS:      The patient is a 68 y.o.  female who is admitted to the hospital for PCI.  The patient has a history of CAD status post PCI in March 2023. He has been complaining of chest pain and had an abnormal stress test. Underwent LHC in [place] and was noted to have D1 and LAD ISR. Sent to [facility] for PCI    PMHs CAD s/p stent, HTN, MR.    Past Medical History:   has a past medical history of Allergic rhinitis, Aphthous ulcer of mouth, CAD (coronary artery disease), H/O echocardiogram, H/O echocardiogram, H/O echocardiogram, Hyperglycemia, Hyperlipidemia, Hypertension, Mitral regurgitation, MVP (mitral valve prolapse), Obesity, Pulmonary hypertension (HCC), S/P cardiac cath, Sleep apnea, Uterine fibroid, Venous insufficiency, and Wears glasses.    Past Surgical History:   has a past surgical history that includes hysteroscopy; Dilation and curettage of uterus (2008); Hysterectomy (2009); Colonoscopy (08/13/2018); Colonoscopy (N/A, 08/13/2018); Abdomen surgery (10/12/2018); Lithotripsy (Left); pr lithotripsy xtrcorp shock wave (Left, 12/04/2018); Coronary angioplasty with stent (03/07/2023); Cardiac catheterization (03/02/2023); Cardiac catheterization (Left, 04/18/2024); and Cardiac procedure (N/A, 4/18/2024).     Home Medications:    Prior to Admission medications    Medication Sig Start Date End Date Taking? Authorizing Provider   metoprolol succinate (TOPROL XL) 25 MG extended release

## 2024-04-26 NOTE — PROGRESS NOTES
Received post procedure to [facility] to room 5. Assessment obtained. Restrictions reviewed with patient. Post procedure pathway initiated.  Right groin site soft, dressing dry and intact.  No hematoma noted.  Family at side.  Patient without complaints. Head of bed flat with right leg straight.

## 2024-04-26 NOTE — PROGRESS NOTES
Patient admitted, consent signed and questions answered. Patient ready for procedure. Call light to reach with side rails up 2 of 2. Bilateral groin clipped with writer and Nancy RN present.  Family at bedside with patient.  History and physical needs to be complete.

## 2024-04-26 NOTE — PROCEDURES
[facility]        Date:   4/26/2024  Patient name: Annabel Mcknight  Date of admission:  4/26/2024  9:53 AM  MRN:   [id number]  YOB: 1955    PCI to LAD/diagonal    Operators:  Primary: Lorin Murphy MD.  Assistant:     Indications for PCI: High-grade in-stent restenosis to mid LAD and ostial diagonal    Procedure performed: Cardiac cath.    Access: Right Femoral artery      Procedure: After informed consent was obtained with explanation of the risks and benefits, patient was brought to the cath lab. The right groin were prepped and draped in sterile fashion. 1% lidocaine was used for local block. The Femoral artery was cannulated with 6  Fr sheath with brisk arterial blood return. The side port was frequently flushed and aspirated with normal saline.    EBL is 15 mL    Dominance is right from previous cardiac cath    Findings:      LAD: Mid long in-stent restenosis of 80%, length is 34 mm, CAS-3 flow, underwent PCI with kissing stents to mid LAD and ostial diagonal using 2.5 x 38 mm Tibbie stent with 0% residual stenosis and CAS-3 flow    1st diagonal: Ostial 70% stenosis length is 5 mm, CAS-3 flow, underwent PCI with AMARILIS kissing with the LAD stent using 2.5 x 8 mm Ron stent with 0% residual stenosis and CAS-3 flow      Conclusions:  Successful kissing stents to mid LAD and ostial first diagonal      Recommendation:  Routine post PCI orders  Medical treatments.  Risk factors modifications.        Electronically signed by Lorin Murphy MD on 4/26/2024 at 1:38 PM      [facility]  [phone]

## 2024-04-26 NOTE — DISCHARGE INSTRUCTIONS
Discharge Instructions for angiogram  Home Care     Ok to shower in AM. Discontinue band aid in AM.   Do not apply further band aids. Keep clean, dry and open to air. No powder or lotion.  Do not soak in a pool or tub and do not swim for one week.   If there is any bleeding at the catheter site, apply firm pressure with your hands until the bleeding stops. If bleeding continues after 3 minutes call 911.   If there is any swelling or firm areas at your puncture site, this could be bleeding under the skin(hematoma), and if you have any concerns seek help immediately.        Drink plenty of fluids after the test. This will flush the x-ray dye from your system.   Return to your normal diet.       The sedative will make you sleepy. Rest until the effects have worn off.   Ask your doctor when you will be able to return to work.   Avoid heavy lifting objects greater than 10  pounds, physically demanding activities, and sexual activity for 5-7 days.   Your activity will also depend upon where the catheter was inserted: Do not sit for long periods of time. Try to change positions frequently.   Medications   If advised by your doctor, resume taking your normal medicines. Use acetaminophen (Tylenol) for pain relief.   Do not take metformin (Glucophage) or glyburide and metformin (Glucovance) for 48 hours after the test.    If you had to stop taking these medications before the procedure, ask your doctor when you can resume taking them:   If youAnti-inflammatory drugs (eg, ibuprofen )   Blood thinners, such as warfarin (Coumadin)   Clopidogrel (Plavix)   If you are taking medicines, follow these general guidelines:   Take your medicine as directed. Do not change the amount or the schedule.   Do not stop taking them without talking to your doctor.   Do not share them.   Know what the results and side effects. Report them to your doctor.   Some drugs can be dangerous when mixed. Talk to a doctor or pharmacist if you are taking  for good.  Be active. Get at least 30 minutes of exercise on most days of the week. Walking is a good choice. You also may want to do other activities, such as running, swimming, cycling, or playing tennis or team sports.  Eat heart-healthy foods. Eat more fruits and vegetables and less foods that contain saturated and trans fats. Limit alcohol, sodium, and sweets.  Stay at a healthy weight. Lose weight if you need to.  Manage other health problems such as diabetes, high blood pressure, and high cholesterol.  Talk to your doctor about taking a daily aspirin.  Manage stress. Stress can hurt your heart. To keep stress low, talk about your problems and feelings. Don't keep your feelings hidden.  How is coronary artery disease treated?  Your doctor will suggest that you make lifestyle changes. For example, your doctor may ask you to eat healthy foods, quit smoking, lose extra weight, and be more active.  You will have to take medicines.  Your doctor may suggest a procedure to open narrowed or blocked arteries. This is called angioplasty. Or your doctor may suggest using healthy blood vessels to create detours around narrowed or blocked arteries. This is called bypass surgery.  Follow-up care is a key part of your treatment and safety. Be sure to make and go to all appointments, and call your doctor if you are having problems. It's also a good idea to know your test results and keep a list of the medicines you take.    Where can you learn more?    Go to [email or web address] and sign in to your [facility] account. Enter C643 in the Search Health Information box to learn more about “Learning About Coronary Artery Disease (CAD).”    If you do not have an account, please click on the “Sign Up Now” link.   © 1798-1070 Healthwise, Incorporated. Care instructions adapted under license by [employer]. This care instruction is for use with your licensed healthcare professional. If you have questions about a

## 2024-04-30 RX ORDER — METOPROLOL SUCCINATE 50 MG/1
50 TABLET, EXTENDED RELEASE ORAL DAILY
Qty: 90 TABLET | Refills: 0 | Status: SHIPPED | OUTPATIENT
Start: 2024-04-30

## 2024-04-30 NOTE — TELEPHONE ENCOUNTER
Pt calls in stating you increased her Toprol xl to 50 mg po qd she was finishing her last script of 25 mg taking 2   Needs new rx   Pended as 50 mg   [facility]

## 2024-05-17 ENCOUNTER — HOSPITAL ENCOUNTER (OUTPATIENT)
Dept: CARDIAC REHAB | Age: 69
Setting detail: THERAPIES SERIES
Discharge: HOME | End: 2024-05-17

## 2024-05-17 NOTE — PROGRESS NOTES
Cardiac Rehab Initial History and Assessment    Annabel Mcknight   1955  [id number]  5/17/2024    Primary Diagnosis: PTCA  Date of event/procedure: 4/26/2024    Participated in cardiac rehab program before:  [x] Yes     [] No    Living Will: [] Yes   [x] No  On File: [] Yes   [] No   [x] N/A  Durable Power of : [] Yes   [x] No    Medical History  Past Medical History:   Diagnosis Date    Allergic rhinitis 1992    Aphthous ulcer of mouth     CAD (coronary artery disease)     H/O echocardiogram 08/11/2015    EF 65%. Aortic valve sclerosis without stenosis. Mild aortic regurgitation.  Myxomatous thickening of the mitral leaflets with Moderate mitral regurgitation. Mils (grade l) diastolic dysfunction.    H/O echocardiogram 09/02/2016    EF >60%.  Normal LV wall thickness and cavity size. No definite specific wall motion abnormalities were identified. Left atrium is mildly dilated (29-33) left atrial volume index of 31 ml/m2. Mild aortic stenosis. Myxomatous thickening of the mitral leaflets with moderate eccentric mitral regurg. Mild treicuspid regurg.  Mild diastolic dysfunction is seen.    H/O echocardiogram 08/06/2018    EF >55%. The LV wall thickness is mildly incrased. Moderate mitral regurg. Mild aortic stenosis. Mild aortic regurg. Mild tricuspid regurg. Evidence of mild (grade I) diastolic dysfucntion is seen.     Hyperglycemia 2013    Hyperlipidemia     Hypertension     Mitral regurgitation 2010    MVP (mitral valve prolapse) 2010    Obesity 2010    Pulmonary hypertension (HCC) 2013    S/P cardiac cath 03/02/2023    [facility]/Dr. Jefferson/Right Radial    Sleep apnea 2012    Uterine fibroid 2008    Venous insufficiency 2013    Wears glasses        Family History  Family History   Problem Relation Age of Onset    High Blood Pressure Father     Heart Disease Father     Stroke Father     Dementia Mother     Other Mother         thoracic AAA    Cancer Mother         bilateral kidney

## 2024-05-17 NOTE — PROGRESS NOTES
Phase II Cardiac Rehabilitation   Physician Order Form    Annabel Plummerlloyd  1955  [id number]  5/17/2024    Procedure/Code:  Phase 2 Cardiac Rehabilitation/07788  Diagnosis/Code:  PTCA/Z95.5  Onset/Procedure Date: 4/26/2024    Prescribed Exercise Plan:  Submaximal exercise evaluation at program beginning and completion  Target HR: 60-80% HRR    Initial MET Level: 3-6 METs     Duration: 31 - 90 Minutes  Frequency: 3 Days per week  Modalities:  Treadmill   Bicycle ergometer/UBE  Seated Stepper  Rowing Machine  Weights/bands  May increase duration per F.I.T.T. protocol parameters on average 5-10 minutes every 1-2 weeks for the first 4-6 weeks.  After 3-4 weeks completed, continue to gradually increase F.I.T.T. parameters gradually at the established duration on average 0.5-1.0 METs per 30 days over the course of the remaining program, as established by patient centered goals and guidelines, maintaining RPE 12-16.   Introduce 8-15 bilateral upper /lower extremity resistance exercise at 1-3 sets per lift, on 2-3 non-consecutive days using TheraBand/weights to 8-15 reps on at lease 2 occasions, maintaining RPE 12-16.  Once repetition maximum has been achieved, additional weight sets may be added.      Standing Orders:  Initiate ACLS for cardiac events  Nitroglycerine 0.4mg SL every 5 minutes X 3 for angina pain  12 lead EKG for chest pain or dysrhythmia  O2 per nasal cannula as needed for SpO2 <90% with symptoms  Random blood glucose per lab for hyper/hypoglycemia symptoms  Lipid panel pre/post program as needed.  May continue in Phase III/maintenance program at program completion

## 2024-05-22 ENCOUNTER — HOSPITAL ENCOUNTER (OUTPATIENT)
Dept: CARDIAC REHAB | Age: 69
Setting detail: THERAPIES SERIES
Discharge: HOME | End: 2024-05-22
Payer: MEDICARE

## 2024-05-22 PROCEDURE — 93798 PHYS/QHP OP CAR RHAB W/ECG: CPT

## 2024-05-23 ENCOUNTER — HOSPITAL ENCOUNTER (OUTPATIENT)
Dept: CARDIAC REHAB | Age: 69
Setting detail: THERAPIES SERIES
Discharge: HOME | End: 2024-05-23
Payer: MEDICARE

## 2024-05-23 PROCEDURE — 93798 PHYS/QHP OP CAR RHAB W/ECG: CPT

## 2024-05-29 ENCOUNTER — HOSPITAL ENCOUNTER (OUTPATIENT)
Dept: CARDIAC REHAB | Age: 69
Setting detail: THERAPIES SERIES
Discharge: HOME | End: 2024-05-29
Payer: MEDICARE

## 2024-05-29 PROCEDURE — 93798 PHYS/QHP OP CAR RHAB W/ECG: CPT

## 2024-05-30 ENCOUNTER — HOSPITAL ENCOUNTER (OUTPATIENT)
Dept: CARDIAC REHAB | Age: 69
Setting detail: THERAPIES SERIES
Discharge: HOME | End: 2024-05-30
Payer: MEDICARE

## 2024-05-30 PROCEDURE — 93798 PHYS/QHP OP CAR RHAB W/ECG: CPT

## 2024-05-30 NOTE — PROGRESS NOTES
Cardiopulmonary Rehab   Medical Nutrition Therapy Food Diary Evaluation    Patient Name: Annabel Mcknight  Registered Dietitian:  RAMYA CLEMENTS, RD, YLNNE, RDN, LD  Date:  5/30/2024    Dear Annabel,    Thank you for sharing your information about your eating patterns, it serves not only to help me see what you are eating, but you as well.  Eating 3 meals a day is great way to start, I am pleased to see that you are doing that.  Whole grains, fruits and vegetables, along with lean meats and low fat dairy are the cornerstones of your health.  It was great to see that you are choosing whole wheat bread whole wheat crackers, blueberries, salad, tomatoes, peppers, and an apple in your meal plans. I was happy to see that you are willing to make dietary and lifestyle modifications to improve your health. With that in mind, look below for some suggestions.    Your REAPS (Rapid Eating Assessment for Participants- short version) Score was: 38, which means: you are making many healthy choices    Recommendations from the Dietitian based on your REAPS and Food Diary / activity record to improve health and decrease risk factors. Please look over the list of suggestions below and pick one thing to start working on, then move onto the next choice, and so forth. Small changes make a big difference.     Limit sodium to less than 2,000 mg every day (added salt and hidden sodium combined).   Include a minimum of 2-3 cups of non starchy vegetables such as broccoli, cauliflower, green beans, carrots, etc. every day (1/2 cup cooked, 1 cup raw).   Add 1.5-2 cups of fruit daily (fresh, frozen, or canned in lite syrup or own juice).   Drink 64 oz water every day.   Include sources of whole grains in daily diet (whole wheat bread, buns, English muffins, brown rice, whole wheat pasta, etc.).   6.  Limit cheese to 1 oz serving size, choose white cheeses for lower sodium, lower fat option such as mozzarella, provolone, etc.   7.  Avoid foods high in

## 2024-06-03 ENCOUNTER — HOSPITAL ENCOUNTER (OUTPATIENT)
Dept: CARDIAC REHAB | Age: 69
Setting detail: THERAPIES SERIES
Discharge: HOME | End: 2024-06-03
Payer: MEDICARE

## 2024-06-03 PROCEDURE — 93798 PHYS/QHP OP CAR RHAB W/ECG: CPT

## 2024-06-04 ENCOUNTER — OFFICE VISIT (OUTPATIENT)
Dept: CARDIOLOGY | Age: 69
End: 2024-06-04
Payer: MEDICARE

## 2024-06-04 ENCOUNTER — HOSPITAL ENCOUNTER (OUTPATIENT)
Age: 69
Discharge: HOME | End: 2024-06-04
Payer: MEDICARE

## 2024-06-04 VITALS
SYSTOLIC BLOOD PRESSURE: 121 MMHG | OXYGEN SATURATION: 95 % | RESPIRATION RATE: 16 BRPM | DIASTOLIC BLOOD PRESSURE: 70 MMHG | WEIGHT: 197.8 LBS | HEART RATE: 70 BPM | BODY MASS INDEX: 33.77 KG/M2 | HEIGHT: 64 IN

## 2024-06-04 DIAGNOSIS — I20.9 ANGINA, CLASS III (HCC): ICD-10-CM

## 2024-06-04 DIAGNOSIS — R06.02 SOB (SHORTNESS OF BREATH): ICD-10-CM

## 2024-06-04 DIAGNOSIS — I25.10 ASHD (ARTERIOSCLEROTIC HEART DISEASE): ICD-10-CM

## 2024-06-04 DIAGNOSIS — I07.1 MODERATE TRICUSPID REGURGITATION: ICD-10-CM

## 2024-06-04 DIAGNOSIS — Z95.820 S/P ANGIOPLASTY WITH STENT: ICD-10-CM

## 2024-06-04 DIAGNOSIS — I35.0 MILD AORTIC STENOSIS: ICD-10-CM

## 2024-06-04 DIAGNOSIS — I10 ESSENTIAL HYPERTENSION: ICD-10-CM

## 2024-06-04 DIAGNOSIS — E66.9 CLASS 1 OBESITY WITH BODY MASS INDEX (BMI) OF 33.0 TO 33.9 IN ADULT, UNSPECIFIED OBESITY TYPE, UNSPECIFIED WHETHER SERIOUS COMORBIDITY PRESENT: ICD-10-CM

## 2024-06-04 DIAGNOSIS — Z98.890 S/P CARDIAC CATH: ICD-10-CM

## 2024-06-04 DIAGNOSIS — I25.10 ASHD (ARTERIOSCLEROTIC HEART DISEASE): Primary | ICD-10-CM

## 2024-06-04 LAB — MAGNESIUM SERPL-MCNC: 2 MG/DL (ref 1.6–2.6)

## 2024-06-04 PROCEDURE — 1123F ACP DISCUSS/DSCN MKR DOCD: CPT | Performed by: PHYSICIAN ASSISTANT

## 2024-06-04 PROCEDURE — 99214 OFFICE O/P EST MOD 30 MIN: CPT | Performed by: PHYSICIAN ASSISTANT

## 2024-06-04 PROCEDURE — 83735 ASSAY OF MAGNESIUM: CPT

## 2024-06-04 PROCEDURE — 36415 COLL VENOUS BLD VENIPUNCTURE: CPT

## 2024-06-04 PROCEDURE — 3078F DIAST BP <80 MM HG: CPT | Performed by: PHYSICIAN ASSISTANT

## 2024-06-04 PROCEDURE — 3074F SYST BP LT 130 MM HG: CPT | Performed by: PHYSICIAN ASSISTANT

## 2024-06-04 NOTE — PROGRESS NOTES
bilaterally.  Neurological: Alertness and orientation as per Constitutional exam. No evidence of gross cranial nerve deficit. Coordination appeared normal.   Skin: Skin is warm and dry. There is no rash or diaphoresis.   Psychiatric: She has a normal mood and affect. Her speech is normal and behavior is normal.       DATA:    Lab Results   Component Value Date    ALT 22 04/22/2024    AST 17 04/22/2024    ALKPHOS 98 04/22/2024    BILITOT 0.5 04/22/2024     Lab Results   Component Value Date    CREATININE 0.7 04/22/2024    BUN 10 04/22/2024     04/22/2024    K 4.1 04/22/2024     04/22/2024    CO2 28 04/22/2024     Lab Results   Component Value Date    TSH 3.17 05/03/2023    X0VJTBP 8.9 07/15/2015     Lab Results   Component Value Date    WBC 7.2 04/22/2024    HGB 14.0 04/22/2024    HCT 43.2 04/22/2024    MCV 90.2 04/22/2024     04/22/2024       Lab Results   Component Value Date    TRIG 100 04/22/2024    TRIG 124 09/12/2023    TRIG 76 02/08/2023     Lab Results   Component Value Date    HDL 43 04/22/2024    HDL 45 (H) 09/12/2023    HDL 42 02/08/2023     No components found for: \"LDLCHOLESTEROL\"    Assessment:     1. ASHD (arteriosclerotic heart disease)    2. S/P cardiac cath    3. S/P angioplasty with stent    4. SOB (shortness of breath)    5. Angina, class III (HCC)    6. Mild aortic stenosis    7. Moderate tricuspid regurgitation    8. Essential hypertension    9. Class 1 obesity with body mass index (BMI) of 33.0 to 33.9 in adult, unspecified obesity type, unspecified whether serious comorbidity present      Plan:   Coronary Artery Disease: S/P Stent on 3/7/2023.   S/P Cardiac Cath - S/P Successful kissing stents to mid LAD and ostial first diagonal on 4/26/2024  Discussed no elective procedures for the next 6 months.  Risk factors: obesity, sedentary life style, hypertension   Antiplatelet Agent: Continue aspirin 81 mg daily and Plavix 75 mg daily.   Beta Blocker: Continue Metoprolol succinate

## 2024-06-05 ENCOUNTER — TELEPHONE (OUTPATIENT)
Dept: CARDIOLOGY | Age: 69
End: 2024-06-05

## 2024-06-05 ENCOUNTER — HOSPITAL ENCOUNTER (OUTPATIENT)
Dept: CARDIAC REHAB | Age: 69
Setting detail: THERAPIES SERIES
Discharge: HOME | End: 2024-06-05
Payer: MEDICARE

## 2024-06-05 PROCEDURE — 93798 PHYS/QHP OP CAR RHAB W/ECG: CPT

## 2024-06-05 NOTE — TELEPHONE ENCOUNTER
----- Message from Sulema Skelton PA-C sent at 6/5/2024  8:05 AM EDT -----  Please notify patient that their lab results are normal.   Please continue current treatment and follow up.

## 2024-06-06 ENCOUNTER — HOSPITAL ENCOUNTER (OUTPATIENT)
Dept: CARDIAC REHAB | Age: 69
Setting detail: THERAPIES SERIES
Discharge: HOME | End: 2024-06-06
Payer: MEDICARE

## 2024-06-06 PROCEDURE — 93798 PHYS/QHP OP CAR RHAB W/ECG: CPT

## 2024-06-10 ENCOUNTER — HOSPITAL ENCOUNTER (OUTPATIENT)
Dept: CARDIAC REHAB | Age: 69
Setting detail: THERAPIES SERIES
Discharge: HOME | End: 2024-06-10
Payer: MEDICARE

## 2024-06-10 PROCEDURE — 93798 PHYS/QHP OP CAR RHAB W/ECG: CPT

## 2024-06-12 ENCOUNTER — HOSPITAL ENCOUNTER (OUTPATIENT)
Dept: CARDIAC REHAB | Age: 69
Setting detail: THERAPIES SERIES
Discharge: HOME | End: 2024-06-12
Payer: MEDICARE

## 2024-06-12 PROCEDURE — 93798 PHYS/QHP OP CAR RHAB W/ECG: CPT

## 2024-06-13 ENCOUNTER — HOSPITAL ENCOUNTER (OUTPATIENT)
Dept: CARDIAC REHAB | Age: 69
Setting detail: THERAPIES SERIES
Discharge: HOME | End: 2024-06-13
Payer: MEDICARE

## 2024-06-13 PROCEDURE — 93798 PHYS/QHP OP CAR RHAB W/ECG: CPT

## 2024-06-17 ENCOUNTER — HOSPITAL ENCOUNTER (OUTPATIENT)
Dept: CARDIAC REHAB | Age: 69
Setting detail: THERAPIES SERIES
Discharge: HOME | End: 2024-06-17
Payer: MEDICARE

## 2024-06-17 PROCEDURE — 93798 PHYS/QHP OP CAR RHAB W/ECG: CPT

## 2024-06-19 ENCOUNTER — HOSPITAL ENCOUNTER (OUTPATIENT)
Dept: CARDIAC REHAB | Age: 69
Setting detail: THERAPIES SERIES
Discharge: HOME | End: 2024-06-19
Payer: MEDICARE

## 2024-06-19 PROCEDURE — 93798 PHYS/QHP OP CAR RHAB W/ECG: CPT

## 2024-06-20 ENCOUNTER — HOSPITAL ENCOUNTER (OUTPATIENT)
Dept: CARDIAC REHAB | Age: 69
Setting detail: THERAPIES SERIES
Discharge: HOME | End: 2024-06-20
Payer: MEDICARE

## 2024-06-20 PROCEDURE — 93798 PHYS/QHP OP CAR RHAB W/ECG: CPT

## 2024-06-24 ENCOUNTER — HOSPITAL ENCOUNTER (OUTPATIENT)
Dept: CARDIAC REHAB | Age: 69
Setting detail: THERAPIES SERIES
Discharge: HOME | End: 2024-06-24
Payer: MEDICARE

## 2024-06-24 PROCEDURE — 93798 PHYS/QHP OP CAR RHAB W/ECG: CPT

## 2024-06-24 RX ORDER — FUROSEMIDE 40 MG/1
40 TABLET ORAL DAILY
Qty: 90 TABLET | Refills: 3 | Status: SHIPPED | OUTPATIENT
Start: 2024-06-24

## 2024-06-26 ENCOUNTER — HOSPITAL ENCOUNTER (OUTPATIENT)
Dept: CARDIAC REHAB | Age: 69
Setting detail: THERAPIES SERIES
Discharge: HOME | End: 2024-06-26
Payer: MEDICARE

## 2024-06-26 PROCEDURE — 93798 PHYS/QHP OP CAR RHAB W/ECG: CPT

## 2024-06-27 ENCOUNTER — HOSPITAL ENCOUNTER (OUTPATIENT)
Dept: CARDIAC REHAB | Age: 69
Setting detail: THERAPIES SERIES
Discharge: HOME | End: 2024-06-27
Payer: MEDICARE

## 2024-06-27 PROCEDURE — 93798 PHYS/QHP OP CAR RHAB W/ECG: CPT

## 2024-07-01 ENCOUNTER — HOSPITAL ENCOUNTER (OUTPATIENT)
Dept: CARDIAC REHAB | Age: 69
Setting detail: THERAPIES SERIES
Discharge: HOME | End: 2024-07-01
Payer: MEDICARE

## 2024-07-01 PROCEDURE — 93798 PHYS/QHP OP CAR RHAB W/ECG: CPT

## 2024-07-02 ENCOUNTER — HOSPITAL ENCOUNTER (OUTPATIENT)
Dept: CARDIAC REHAB | Age: 69
Setting detail: THERAPIES SERIES
Discharge: HOME | End: 2024-07-02
Payer: MEDICARE

## 2024-07-02 PROCEDURE — 93798 PHYS/QHP OP CAR RHAB W/ECG: CPT

## 2024-07-03 ENCOUNTER — HOSPITAL ENCOUNTER (OUTPATIENT)
Dept: CARDIAC REHAB | Age: 69
Setting detail: THERAPIES SERIES
Discharge: HOME | End: 2024-07-03
Payer: MEDICARE

## 2024-07-03 PROCEDURE — 93798 PHYS/QHP OP CAR RHAB W/ECG: CPT

## 2024-07-08 ENCOUNTER — HOSPITAL ENCOUNTER (OUTPATIENT)
Dept: CARDIAC REHAB | Age: 69
Setting detail: THERAPIES SERIES
Discharge: HOME | End: 2024-07-08
Payer: MEDICARE

## 2024-07-08 PROCEDURE — 93798 PHYS/QHP OP CAR RHAB W/ECG: CPT

## 2024-07-10 ENCOUNTER — HOSPITAL ENCOUNTER (OUTPATIENT)
Dept: CARDIAC REHAB | Age: 69
Setting detail: THERAPIES SERIES
Discharge: HOME | End: 2024-07-10
Payer: MEDICARE

## 2024-07-10 PROCEDURE — 93798 PHYS/QHP OP CAR RHAB W/ECG: CPT

## 2024-07-11 ENCOUNTER — HOSPITAL ENCOUNTER (OUTPATIENT)
Dept: CARDIAC REHAB | Age: 69
Setting detail: THERAPIES SERIES
Discharge: HOME | End: 2024-07-11
Payer: MEDICARE

## 2024-07-11 PROCEDURE — 93798 PHYS/QHP OP CAR RHAB W/ECG: CPT

## 2024-07-15 ENCOUNTER — HOSPITAL ENCOUNTER (OUTPATIENT)
Dept: CARDIAC REHAB | Age: 69
Setting detail: THERAPIES SERIES
Discharge: HOME | End: 2024-07-15
Payer: MEDICARE

## 2024-07-15 PROCEDURE — 93798 PHYS/QHP OP CAR RHAB W/ECG: CPT

## 2024-07-17 ENCOUNTER — HOSPITAL ENCOUNTER (OUTPATIENT)
Dept: CARDIAC REHAB | Age: 69
Setting detail: THERAPIES SERIES
Discharge: HOME | End: 2024-07-17
Payer: MEDICARE

## 2024-07-17 PROCEDURE — 93798 PHYS/QHP OP CAR RHAB W/ECG: CPT

## 2024-07-18 ENCOUNTER — HOSPITAL ENCOUNTER (OUTPATIENT)
Dept: CARDIAC REHAB | Age: 69
Setting detail: THERAPIES SERIES
Discharge: HOME | End: 2024-07-18
Payer: MEDICARE

## 2024-07-18 PROCEDURE — 93798 PHYS/QHP OP CAR RHAB W/ECG: CPT

## 2024-07-22 ENCOUNTER — HOSPITAL ENCOUNTER (OUTPATIENT)
Dept: CARDIAC REHAB | Age: 69
Setting detail: THERAPIES SERIES
Discharge: HOME | End: 2024-07-22
Payer: MEDICARE

## 2024-07-22 PROCEDURE — 93798 PHYS/QHP OP CAR RHAB W/ECG: CPT

## 2024-07-24 ENCOUNTER — HOSPITAL ENCOUNTER (OUTPATIENT)
Dept: CARDIAC REHAB | Age: 69
Setting detail: THERAPIES SERIES
Discharge: HOME | End: 2024-07-24
Payer: MEDICARE

## 2024-07-24 PROCEDURE — 93798 PHYS/QHP OP CAR RHAB W/ECG: CPT

## 2024-07-25 ENCOUNTER — HOSPITAL ENCOUNTER (OUTPATIENT)
Dept: CARDIAC REHAB | Age: 69
Setting detail: THERAPIES SERIES
Discharge: HOME | End: 2024-07-25
Payer: MEDICARE

## 2024-07-25 PROCEDURE — 93798 PHYS/QHP OP CAR RHAB W/ECG: CPT

## 2024-07-29 ENCOUNTER — APPOINTMENT (OUTPATIENT)
Dept: CARDIAC REHAB | Age: 69
End: 2024-07-29
Payer: MEDICARE

## 2024-07-29 PROCEDURE — 93798 PHYS/QHP OP CAR RHAB W/ECG: CPT

## 2024-07-29 RX ORDER — METOPROLOL SUCCINATE 50 MG/1
50 TABLET, EXTENDED RELEASE ORAL DAILY
Qty: 90 TABLET | Refills: 3 | Status: SHIPPED | OUTPATIENT
Start: 2024-07-29

## 2024-07-31 ENCOUNTER — APPOINTMENT (OUTPATIENT)
Dept: CARDIAC REHAB | Age: 69
End: 2024-07-31
Payer: MEDICARE

## 2024-07-31 PROCEDURE — 93798 PHYS/QHP OP CAR RHAB W/ECG: CPT

## 2024-07-31 SDOH — HEALTH STABILITY: PHYSICAL HEALTH: ON AVERAGE, HOW MANY MINUTES DO YOU ENGAGE IN EXERCISE AT THIS LEVEL?: 30 MIN

## 2024-07-31 SDOH — HEALTH STABILITY: PHYSICAL HEALTH: ON AVERAGE, HOW MANY DAYS PER WEEK DO YOU ENGAGE IN MODERATE TO STRENUOUS EXERCISE (LIKE A BRISK WALK)?: 5 DAYS

## 2024-07-31 ASSESSMENT — LIFESTYLE VARIABLES
HOW OFTEN DO YOU HAVE A DRINK CONTAINING ALCOHOL: NEVER
HOW OFTEN DO YOU HAVE A DRINK CONTAINING ALCOHOL: 1
HOW MANY STANDARD DRINKS CONTAINING ALCOHOL DO YOU HAVE ON A TYPICAL DAY: 0
HOW OFTEN DO YOU HAVE SIX OR MORE DRINKS ON ONE OCCASION: 1
HOW MANY STANDARD DRINKS CONTAINING ALCOHOL DO YOU HAVE ON A TYPICAL DAY: PATIENT DOES NOT DRINK

## 2024-07-31 ASSESSMENT — PATIENT HEALTH QUESTIONNAIRE - PHQ9
1. LITTLE INTEREST OR PLEASURE IN DOING THINGS: NOT AT ALL
SUM OF ALL RESPONSES TO PHQ QUESTIONS 1-9: 0
SUM OF ALL RESPONSES TO PHQ9 QUESTIONS 1 & 2: 0
2. FEELING DOWN, DEPRESSED OR HOPELESS: NOT AT ALL
SUM OF ALL RESPONSES TO PHQ QUESTIONS 1-9: 0

## 2024-08-01 ENCOUNTER — HOSPITAL ENCOUNTER (OUTPATIENT)
Dept: CARDIAC REHAB | Age: 69
Setting detail: THERAPIES SERIES
Discharge: HOME | End: 2024-08-01
Payer: MEDICARE

## 2024-08-01 ENCOUNTER — OFFICE VISIT (OUTPATIENT)
Dept: PRIMARY CARE CLINIC | Age: 69
End: 2024-08-01
Payer: MEDICARE

## 2024-08-01 VITALS
HEART RATE: 68 BPM | TEMPERATURE: 98.3 F | HEIGHT: 64 IN | RESPIRATION RATE: 18 BRPM | WEIGHT: 196 LBS | OXYGEN SATURATION: 98 % | DIASTOLIC BLOOD PRESSURE: 62 MMHG | BODY MASS INDEX: 33.46 KG/M2 | SYSTOLIC BLOOD PRESSURE: 138 MMHG

## 2024-08-01 DIAGNOSIS — Z00.00 MEDICARE ANNUAL WELLNESS VISIT, SUBSEQUENT: Primary | ICD-10-CM

## 2024-08-01 DIAGNOSIS — Z71.89 ACP (ADVANCE CARE PLANNING): ICD-10-CM

## 2024-08-01 PROBLEM — H92.02 LEFT EAR PAIN: Status: RESOLVED | Noted: 2023-03-20 | Resolved: 2024-08-01

## 2024-08-01 PROBLEM — U07.1 COVID-19: Status: RESOLVED | Noted: 2022-09-09 | Resolved: 2024-08-01

## 2024-08-01 PROCEDURE — 1123F ACP DISCUSS/DSCN MKR DOCD: CPT | Performed by: NURSE PRACTITIONER

## 2024-08-01 PROCEDURE — G0439 PPPS, SUBSEQ VISIT: HCPCS | Performed by: NURSE PRACTITIONER

## 2024-08-01 PROCEDURE — 3078F DIAST BP <80 MM HG: CPT | Performed by: NURSE PRACTITIONER

## 2024-08-01 PROCEDURE — 3075F SYST BP GE 130 - 139MM HG: CPT | Performed by: NURSE PRACTITIONER

## 2024-08-01 PROCEDURE — 93798 PHYS/QHP OP CAR RHAB W/ECG: CPT

## 2024-08-01 ASSESSMENT — ENCOUNTER SYMPTOMS
CONSTIPATION: 0
SHORTNESS OF BREATH: 0
DIARRHEA: 0

## 2024-08-01 NOTE — PROGRESS NOTES
Medicare Annual Wellness Visit    Annabel Mcknight is here for Medicare AWV (-patient is here for medicare annual wellness. )       Subjective     Wellness:  She admits well balanced diet. For exercise she notes completing cardio rehab 3 times per week and treadmill at home 20-30 minutes at a time. She admits routine eye exams. She admits routine dental exams. She is vaccinated for covid. She is not UTD shingles vaccine. She is not UTD RSV vaccine. She is UTD pneumococcal vaccine. 3/2024 mammogram negative. She admits fam hx breast cancer (maternal aunt). Colonscopy 8/2018 with recommended repeat 10 years. She denies fam hx colorectal cancer.     Patient's complete Health Risk Assessment and screening values have been reviewed and are found in Flowsheets. The following problems were reviewed today and where indicated follow up appointments were made and/or referrals ordered.    Positive Risk Factor Screenings with Interventions:                  Abnormal BMI (obese):  Body mass index is 33.64 kg/m². (!) Abnormal  Interventions:  Counseled on healthy diet and routine exercise        Hearing Screen:  Do you or your family notice any trouble with your hearing that hasn't been managed with hearing aids?: (!) Yes    Interventions:  Following with Dr. Viera (audiology)     Safety:  Do you have non-slip mats or non-slip surfaces or shower bars or grab bars in your shower or bathtub?: (!) No  Interventions:  Home safety tips provided      Advanced Directives:  Do you have a Living Will?: (!) No    Intervention:  ACP packet supplied to patient. ACP referral placed, patient agreeable     Review of Systems   Constitutional:  Negative for chills and fever.   Respiratory:  Negative for shortness of breath.    Cardiovascular:  Negative for chest pain and palpitations.   Gastrointestinal:  Negative for constipation and diarrhea.             Objective   Vitals:    08/01/24 0756   BP: 138/62   Pulse: 68   Resp: 18   Temp: 98.3

## 2024-08-02 ENCOUNTER — CLINICAL DOCUMENTATION (OUTPATIENT)
Dept: SPIRITUAL SERVICES | Age: 69
End: 2024-08-02

## 2024-08-02 NOTE — ACP (ADVANCE CARE PLANNING)
Advance Care Planning   Ambulatory ACP Specialist Patient Outreach    Date:  8/2/2024    ACP Specialist:  Laverne Jean Baptiste    Outreach call to patient in follow-up to ACP Specialist referral from: Stephania Ryan APRN - CNP    [x] PCP  [] Provider   [] Ambulatory Care Management [] Other     For:                  [x] Advance Directive Assistance              [x] Complete Portable DNR order              [x] Complete POST/POLST/MOST              [x] Code Status Discussion             [x] Discuss Goals of Care             [x] Early ACP Decision-Making              [] Other (Specify)    Date Referral Received: 8/1/2024    Next Step:   [] ACP scheduled conversation  [x] Outreach again in one week               [x] Email / Mail ACP Info Sheets  [x] Email / Mail Advance Directive   [] Closing referral.  Routing closure to referring provider/staff and to ACP Specialist .    [] Closure letter mailed to patient with invitation to contact ACP Specialist if / when ready.   [] Other (Specify here):       [x] At this time, Healthcare Decision Maker Is:         Primary Decision Maker: Gayr Mcknight - Spouse - [phone]      [] Primary agent named in scanned advance directive.    [x] Legal Next of Kin.     [] Unable to determine legal decision maker at this time.    Outreaches:       [x] 1st -  Date:  8/2/2024               Intervention:  [] Spoke with Patient   [x] Left Voice mail [x] Email / Mail    [] MyChart  [] Other (Specify) :     Outcomes:  Writer attempted ACP outreach to both - patient's home ([phone]) and mobile ([phone]) - no answer.  Writer left voicemail on both lines requesting return call including call back number.  ACP outreach sent to patient's email address on file with a copy of [place] [employer] forms and ACP information sheets \"What is Advance Care Planning\" and \"How to Choose a Health Care Agent\" (ACP Coordinator contact information included).   ACP outreach will be attempted in about

## 2024-08-05 ENCOUNTER — HOSPITAL ENCOUNTER (OUTPATIENT)
Dept: CARDIAC REHAB | Age: 69
Setting detail: THERAPIES SERIES
Discharge: HOME | End: 2024-08-05
Payer: MEDICARE

## 2024-08-05 PROCEDURE — 93798 PHYS/QHP OP CAR RHAB W/ECG: CPT

## 2024-08-07 ENCOUNTER — HOSPITAL ENCOUNTER (OUTPATIENT)
Dept: CARDIAC REHAB | Age: 69
Setting detail: THERAPIES SERIES
Discharge: HOME | End: 2024-08-07
Payer: MEDICARE

## 2024-08-07 PROCEDURE — 93798 PHYS/QHP OP CAR RHAB W/ECG: CPT

## 2024-08-08 ENCOUNTER — HOSPITAL ENCOUNTER (OUTPATIENT)
Dept: CARDIAC REHAB | Age: 69
Setting detail: THERAPIES SERIES
Discharge: HOME | End: 2024-08-08
Payer: MEDICARE

## 2024-08-08 PROCEDURE — 93798 PHYS/QHP OP CAR RHAB W/ECG: CPT

## 2024-08-12 ENCOUNTER — HOSPITAL ENCOUNTER (OUTPATIENT)
Dept: CARDIAC REHAB | Age: 69
Setting detail: THERAPIES SERIES
Discharge: HOME | End: 2024-08-12
Payer: MEDICARE

## 2024-08-12 PROCEDURE — 93798 PHYS/QHP OP CAR RHAB W/ECG: CPT

## 2024-08-14 ENCOUNTER — HOSPITAL ENCOUNTER (OUTPATIENT)
Dept: CARDIAC REHAB | Age: 69
Setting detail: THERAPIES SERIES
Discharge: HOME | End: 2024-08-14
Payer: MEDICARE

## 2024-08-14 PROCEDURE — 93798 PHYS/QHP OP CAR RHAB W/ECG: CPT

## 2024-08-15 ENCOUNTER — APPOINTMENT (OUTPATIENT)
Dept: CARDIAC REHAB | Age: 69
End: 2024-08-15
Payer: MEDICARE

## 2024-08-15 DIAGNOSIS — I10 ESSENTIAL HYPERTENSION: ICD-10-CM

## 2024-08-15 DIAGNOSIS — K21.9 GASTROESOPHAGEAL REFLUX DISEASE, UNSPECIFIED WHETHER ESOPHAGITIS PRESENT: ICD-10-CM

## 2024-08-15 DIAGNOSIS — E78.5 HYPERLIPIDEMIA, UNSPECIFIED HYPERLIPIDEMIA TYPE: ICD-10-CM

## 2024-08-15 DIAGNOSIS — R73.9 HYPERGLYCEMIA: ICD-10-CM

## 2024-08-15 RX ORDER — PANTOPRAZOLE SODIUM 40 MG/1
TABLET, DELAYED RELEASE ORAL
Qty: 90 TABLET | Refills: 1 | Status: SHIPPED | OUTPATIENT
Start: 2024-08-15

## 2024-08-19 ENCOUNTER — APPOINTMENT (OUTPATIENT)
Dept: CARDIAC REHAB | Age: 69
End: 2024-08-19
Payer: MEDICARE

## 2024-08-21 ENCOUNTER — TELEPHONE (OUTPATIENT)
Dept: PRIMARY CARE CLINIC | Age: 69
End: 2024-08-21

## 2024-08-21 ENCOUNTER — APPOINTMENT (OUTPATIENT)
Dept: CARDIAC REHAB | Age: 69
End: 2024-08-21
Payer: MEDICARE

## 2024-08-21 ENCOUNTER — HOSPITAL ENCOUNTER (OUTPATIENT)
Age: 69
Discharge: HOME | End: 2024-08-21
Payer: MEDICARE

## 2024-08-21 ENCOUNTER — OFFICE VISIT (OUTPATIENT)
Dept: PRIMARY CARE CLINIC | Age: 69
End: 2024-08-21
Payer: MEDICARE

## 2024-08-21 ENCOUNTER — HOSPITAL ENCOUNTER (OUTPATIENT)
Dept: GENERAL RADIOLOGY | Age: 69
Discharge: HOME | End: 2024-08-23
Payer: MEDICARE

## 2024-08-21 ENCOUNTER — HOSPITAL ENCOUNTER (OUTPATIENT)
Age: 69
Discharge: HOME | End: 2024-08-23
Payer: MEDICARE

## 2024-08-21 VITALS
BODY MASS INDEX: 33.64 KG/M2 | TEMPERATURE: 97.1 F | DIASTOLIC BLOOD PRESSURE: 62 MMHG | OXYGEN SATURATION: 96 % | WEIGHT: 196 LBS | SYSTOLIC BLOOD PRESSURE: 130 MMHG | HEART RATE: 75 BPM

## 2024-08-21 DIAGNOSIS — R07.89 CHEST PRESSURE: ICD-10-CM

## 2024-08-21 DIAGNOSIS — U07.1 COVID-19: Primary | ICD-10-CM

## 2024-08-21 DIAGNOSIS — J34.89 SINUS PRESSURE: ICD-10-CM

## 2024-08-21 LAB
EKG ATRIAL RATE: 67 BPM
EKG P AXIS: 38 DEGREES
EKG P-R INTERVAL: 168 MS
EKG Q-T INTERVAL: 366 MS
EKG QRS DURATION: 86 MS
EKG QTC CALCULATION (BAZETT): 386 MS
EKG R AXIS: 22 DEGREES
EKG T AXIS: 31 DEGREES
EKG VENTRICULAR RATE: 67 BPM
INFLUENZA A ANTIBODY: NORMAL
INFLUENZA B ANTIBODY: NORMAL
Lab: ABNORMAL
PERFORMING INSTRUMENT: ABNORMAL
QC PASS/FAIL: ABNORMAL
SARS-COV-2, POC: DETECTED

## 2024-08-21 PROCEDURE — 87804 INFLUENZA ASSAY W/OPTIC: CPT | Performed by: NURSE PRACTITIONER

## 2024-08-21 PROCEDURE — 3075F SYST BP GE 130 - 139MM HG: CPT | Performed by: NURSE PRACTITIONER

## 2024-08-21 PROCEDURE — 87426 SARSCOV CORONAVIRUS AG IA: CPT | Performed by: NURSE PRACTITIONER

## 2024-08-21 PROCEDURE — 93005 ELECTROCARDIOGRAM TRACING: CPT

## 2024-08-21 PROCEDURE — 71046 X-RAY EXAM CHEST 2 VIEWS: CPT

## 2024-08-21 PROCEDURE — 99214 OFFICE O/P EST MOD 30 MIN: CPT | Performed by: NURSE PRACTITIONER

## 2024-08-21 PROCEDURE — 93010 ELECTROCARDIOGRAM REPORT: CPT | Performed by: INTERNAL MEDICINE

## 2024-08-21 PROCEDURE — 1123F ACP DISCUSS/DSCN MKR DOCD: CPT | Performed by: NURSE PRACTITIONER

## 2024-08-21 PROCEDURE — 3078F DIAST BP <80 MM HG: CPT | Performed by: NURSE PRACTITIONER

## 2024-08-21 NOTE — TELEPHONE ENCOUNTER
[facility] called [facility] they dont have it, they sent to [facility] and they dont take her insurance, they sent it to [facility] and they said her insurance dont cover it $1,700 and said we had to call for a prior auth

## 2024-08-21 NOTE — TELEPHONE ENCOUNTER
This medication should be started within 5 days of symptom onset. I would recommend she call alternative pharmacies to see if it can be dispensed ASAP

## 2024-08-21 NOTE — TELEPHONE ENCOUNTER
Pt states she was not able to fill Paxlovid at [facility], medication was transferred to [facility] but needs prior auth.Pt was told this could take up to 72 hours. Will this be too late?     Phone number for PA is: [phone]

## 2024-08-21 NOTE — PROGRESS NOTES
content normal.         Judgment: Judgment normal.         Data:     Lab Results   Component Value Date/Time     04/22/2024 07:40 AM    K 4.1 04/22/2024 07:40 AM     04/22/2024 07:40 AM    CO2 28 04/22/2024 07:40 AM    BUN 10 04/22/2024 07:40 AM    CREATININE 0.7 04/22/2024 07:40 AM    GLUCOSE 90 04/22/2024 07:40 AM    GLUCOSE 98 05/08/2012 02:33 PM    BILITOT 0.5 04/22/2024 07:40 AM    ALKPHOS 98 04/22/2024 07:40 AM    AST 17 04/22/2024 07:40 AM    ALT 22 04/22/2024 07:40 AM     Lab Results   Component Value Date/Time    WBC 7.2 04/22/2024 07:40 AM    RBC 4.79 04/22/2024 07:40 AM    RBC 4.52 05/08/2012 02:33 PM    HGB 14.0 04/22/2024 07:40 AM    HCT 43.2 04/22/2024 07:40 AM    MCV 90.2 04/22/2024 07:40 AM    MCH 29.2 04/22/2024 07:40 AM    MCHC 32.4 04/22/2024 07:40 AM    RDW 12.3 04/22/2024 07:40 AM     04/22/2024 07:40 AM     05/08/2012 02:33 PM    MPV 10.0 04/22/2024 07:40 AM     Lab Results   Component Value Date/Time    TSH 3.17 05/03/2023 09:34 AM     Lab Results   Component Value Date/Time    CHOL 116 04/22/2024 07:40 AM    LDL 53 04/22/2024 07:40 AM    HDL 43 04/22/2024 07:40 AM    LABA1C 5.8 04/22/2024 07:40 AM       Assessment/Plan:      Diagnosis Orders   1. COVID-19        2. Sinus pressure  POCT Influenza A/B    POCT COVID-19, Antigen      3. Chest pressure  XR CHEST STANDARD (2 VW)    EKG 12 lead        - Vital signs stable.  Lungs clear to auscultation  - POC influenza negative  - POC COVID positive  - Start Paxlovid.  Patient does qualify for this medication based off her medical history including hypertension and coronary artery disease.  Hold atorvastatin while on this medication  - Reviewed importance of rest, hydration, warm tea with honey, humidifier use, OTC medication for symptom relief such as Tylenol  - Reviewed CDC guidelines regarding quarantining/masking  - Patient does note concern with chest pressure, not currently having any symptoms during today's visit.

## 2024-08-22 ENCOUNTER — APPOINTMENT (OUTPATIENT)
Dept: CARDIAC REHAB | Age: 69
End: 2024-08-22
Payer: MEDICARE

## 2024-08-26 ENCOUNTER — APPOINTMENT (OUTPATIENT)
Dept: CARDIAC REHAB | Age: 69
End: 2024-08-26
Payer: MEDICARE

## 2024-08-28 ENCOUNTER — APPOINTMENT (OUTPATIENT)
Dept: CARDIAC REHAB | Age: 69
End: 2024-08-28
Payer: MEDICARE

## 2024-08-29 ENCOUNTER — APPOINTMENT (OUTPATIENT)
Dept: CARDIAC REHAB | Age: 69
End: 2024-08-29
Payer: MEDICARE

## 2024-09-09 DIAGNOSIS — R06.02 SOB (SHORTNESS OF BREATH): ICD-10-CM

## 2024-09-09 DIAGNOSIS — I34.0 NONRHEUMATIC MITRAL VALVE REGURGITATION: ICD-10-CM

## 2024-09-09 DIAGNOSIS — E66.9 CLASS 1 OBESITY WITH BODY MASS INDEX (BMI) OF 33.0 TO 33.9 IN ADULT, UNSPECIFIED OBESITY TYPE, UNSPECIFIED WHETHER SERIOUS COMORBIDITY PRESENT: ICD-10-CM

## 2024-09-09 DIAGNOSIS — Z01.810 PREOP CARDIOVASCULAR EXAM: ICD-10-CM

## 2024-09-09 DIAGNOSIS — I35.0 NONRHEUMATIC AORTIC (VALVE) STENOSIS: ICD-10-CM

## 2024-09-09 DIAGNOSIS — I25.10 ASHD (ARTERIOSCLEROTIC HEART DISEASE): ICD-10-CM

## 2024-09-09 DIAGNOSIS — Z95.820 S/P ANGIOPLASTY WITH STENT: ICD-10-CM

## 2024-09-09 DIAGNOSIS — I10 PRIMARY HYPERTENSION: ICD-10-CM

## 2024-09-09 DIAGNOSIS — Z98.890 S/P CARDIAC CATH: ICD-10-CM

## 2024-09-09 RX ORDER — CLOPIDOGREL BISULFATE 75 MG/1
75 TABLET ORAL DAILY
Qty: 90 TABLET | Refills: 3 | Status: SHIPPED | OUTPATIENT
Start: 2024-09-09

## 2024-09-26 ENCOUNTER — HOSPITAL ENCOUNTER (OUTPATIENT)
Dept: CT IMAGING | Age: 69
Discharge: HOME | End: 2024-09-28
Payer: MEDICARE

## 2024-09-26 DIAGNOSIS — N20.0 KIDNEY STONES: ICD-10-CM

## 2024-09-26 PROCEDURE — 74176 CT ABD & PELVIS W/O CONTRAST: CPT

## 2024-10-02 ENCOUNTER — OFFICE VISIT (OUTPATIENT)
Dept: UROLOGY | Age: 69
End: 2024-10-02
Payer: MEDICARE

## 2024-10-02 ENCOUNTER — TELEPHONE (OUTPATIENT)
Dept: UROLOGY | Age: 69
End: 2024-10-02

## 2024-10-02 ENCOUNTER — HOSPITAL ENCOUNTER (OUTPATIENT)
Age: 69
Setting detail: SPECIMEN
Discharge: HOME | End: 2024-10-02
Payer: MEDICARE

## 2024-10-02 VITALS
TEMPERATURE: 97.3 F | BODY MASS INDEX: 33.46 KG/M2 | DIASTOLIC BLOOD PRESSURE: 70 MMHG | SYSTOLIC BLOOD PRESSURE: 126 MMHG | WEIGHT: 196 LBS | HEIGHT: 64 IN

## 2024-10-02 DIAGNOSIS — R31.0 GROSS HEMATURIA: ICD-10-CM

## 2024-10-02 DIAGNOSIS — N20.0 KIDNEY STONES: Primary | ICD-10-CM

## 2024-10-02 LAB
BACTERIA URNS QL MICRO: ABNORMAL
BILIRUB UR QL STRIP: NEGATIVE
CHARACTER UR: ABNORMAL
CHARACTER UR: ABNORMAL
CLARITY UR: ABNORMAL
COLOR UR: YELLOW
EPI CELLS #/AREA URNS HPF: ABNORMAL /HPF (ref 0–25)
GLUCOSE UR STRIP-MCNC: NEGATIVE MG/DL
HGB UR QL STRIP.AUTO: NEGATIVE
KETONES UR STRIP-MCNC: NEGATIVE MG/DL
LEUKOCYTE ESTERASE UR QL STRIP: ABNORMAL
NITRITE UR QL STRIP: NEGATIVE
PH UR STRIP: 6 [PH] (ref 5–9)
PROT UR STRIP-MCNC: NEGATIVE MG/DL
RBC #/AREA URNS HPF: ABNORMAL /HPF (ref 0–2)
RENAL EPITHELIAL, UA: ABNORMAL /HPF
SP GR UR STRIP: 1.02 (ref 1.01–1.02)
UROBILINOGEN UR STRIP-ACNC: NORMAL EU/DL (ref 0–1)
WBC #/AREA URNS HPF: ABNORMAL /HPF (ref 0–5)

## 2024-10-02 PROCEDURE — 1123F ACP DISCUSS/DSCN MKR DOCD: CPT | Performed by: PHYSICIAN ASSISTANT

## 2024-10-02 PROCEDURE — 99214 OFFICE O/P EST MOD 30 MIN: CPT | Performed by: PHYSICIAN ASSISTANT

## 2024-10-02 PROCEDURE — 3074F SYST BP LT 130 MM HG: CPT | Performed by: PHYSICIAN ASSISTANT

## 2024-10-02 PROCEDURE — 3078F DIAST BP <80 MM HG: CPT | Performed by: PHYSICIAN ASSISTANT

## 2024-10-02 PROCEDURE — 81001 URINALYSIS AUTO W/SCOPE: CPT

## 2024-10-02 NOTE — TELEPHONE ENCOUNTER
----- Message from Neil Yoder PA-C sent at 10/2/2024  2:04 PM EDT -----  Please let her know there was no significant blood seen in her urine

## 2024-10-02 NOTE — TELEPHONE ENCOUNTER
Tried to contact the patient, left message on the machine for her to return a call to the office.

## 2024-10-02 NOTE — PROGRESS NOTES
HPI:    Patient is a 68 y.o. female in no acute distress.  She is alert and oriented to person, place, and time.     History  8/6/2018 Referral from Dr. Hdez for gross hematuria.  She first noticed the hematuria in the spring and has had multiple episodes since.  This has not been associated with lower urinary tract symptoms, but she does experience suprapubic \"cramping\".  She was never a smoker.  She does work for [employer].  She denies history of kidney stones or frequent urinary tract infection.  She did have a hysterectomy in 2009, but does have her ovaries.       8/2018 CT showed punctate nonobstructing stones in the right, and a 5 mm nonobstructing stone in the left kidney but there is no hydronephrosis.  There is no no masses or filling defects in the upper collecting system, ureters, or bladder to suggest malignancy.  The radiologist as mentioned a multicystic residual right ovary and a mass lesion of the vaginal cuff.  She did see gynecology for this and they are completing a work-up for her GYN abnormalities. Cystoscopy was negative.     10/2018 excision of pelvic mass     12/2018 left ESWL    Today:  Patient is here today for kidney stone follow-up and hematuria.  Patient has not had any gross hematuria since her last visit.  Patient did have a KUB last year which showed possible right-sided stone versus gallstones.  Patient had cardiac procedures procedures within the past 2 years which precluded her from having ESWL.  As patient was asymptomatic last year we made a decision to obtain a CT stone protocol this year to differentiate between gallstone and renal calculi.  We did independently review her CT stone protocol.  Indeed, patient has a significant amount of gallstones which on plain film appear to be in the kidney.  This is not the case.  Patient does have punctate bilateral renal calculi.  These are not significant enough to warrant any surgical intervention.  Patient states that she has

## 2024-12-02 ENCOUNTER — OFFICE VISIT (OUTPATIENT)
Dept: PRIMARY CARE CLINIC | Age: 69
End: 2024-12-02
Payer: MEDICARE

## 2024-12-02 ENCOUNTER — HOSPITAL ENCOUNTER (OUTPATIENT)
Age: 69
Discharge: HOME | End: 2024-12-02

## 2024-12-02 VITALS
DIASTOLIC BLOOD PRESSURE: 62 MMHG | BODY MASS INDEX: 33.99 KG/M2 | SYSTOLIC BLOOD PRESSURE: 130 MMHG | TEMPERATURE: 95.5 F | OXYGEN SATURATION: 98 % | WEIGHT: 198 LBS | HEART RATE: 66 BPM

## 2024-12-02 DIAGNOSIS — R07.9 CHEST PAIN, UNSPECIFIED TYPE: ICD-10-CM

## 2024-12-02 DIAGNOSIS — R73.9 HYPERGLYCEMIA: ICD-10-CM

## 2024-12-02 DIAGNOSIS — K21.9 GASTROESOPHAGEAL REFLUX DISEASE, UNSPECIFIED WHETHER ESOPHAGITIS PRESENT: Primary | ICD-10-CM

## 2024-12-02 DIAGNOSIS — I10 ESSENTIAL HYPERTENSION: ICD-10-CM

## 2024-12-02 DIAGNOSIS — R73.03 PREDIABETES: ICD-10-CM

## 2024-12-02 DIAGNOSIS — E78.5 HYPERLIPIDEMIA, UNSPECIFIED HYPERLIPIDEMIA TYPE: ICD-10-CM

## 2024-12-02 LAB
EKG ATRIAL RATE: 60 BPM
EKG P AXIS: 5 DEGREES
EKG P-R INTERVAL: 160 MS
EKG Q-T INTERVAL: 398 MS
EKG QRS DURATION: 86 MS
EKG QTC CALCULATION (BAZETT): 398 MS
EKG R AXIS: 26 DEGREES
EKG T AXIS: 30 DEGREES
EKG VENTRICULAR RATE: 60 BPM

## 2024-12-02 PROCEDURE — 1159F MED LIST DOCD IN RCRD: CPT | Performed by: NURSE PRACTITIONER

## 2024-12-02 PROCEDURE — 99214 OFFICE O/P EST MOD 30 MIN: CPT | Performed by: NURSE PRACTITIONER

## 2024-12-02 PROCEDURE — 1123F ACP DISCUSS/DSCN MKR DOCD: CPT | Performed by: NURSE PRACTITIONER

## 2024-12-02 PROCEDURE — 3078F DIAST BP <80 MM HG: CPT | Performed by: NURSE PRACTITIONER

## 2024-12-02 PROCEDURE — 3075F SYST BP GE 130 - 139MM HG: CPT | Performed by: NURSE PRACTITIONER

## 2024-12-02 RX ORDER — PANTOPRAZOLE SODIUM 20 MG/1
20 TABLET, DELAYED RELEASE ORAL
Qty: 90 TABLET | Refills: 1 | Status: SHIPPED | OUTPATIENT
Start: 2024-12-02

## 2024-12-02 NOTE — PROGRESS NOTES
Name: Annabel Mcknight  : 1955         Chief Complaint:     Chief Complaint   Patient presents with    Follow-up       History of Present Illness:      Annabel Mcknight is a 68 y.o.  female who presents with Follow-up      HPI    Chest Pain:  The patient notes concern with chest pain and SOB with activity such as going up stairs. This lasts one minute on average and resolves with rest. Denies current chest pain. This has been ongoing since 10/2024. She does have hx of ASHD. She is s/p PCI 2024. She is scheduled to see her cardiologist at [facility] 24. She is continuing aspirin 81mg, plavix 75mg, and atorvastatin 80mg QD.     Hypertension:  Current treatment includes lasix 40mg QD, metoprolol succinate 50mg QD, and amlodipine 5mg QD. She does monitor her blood pressure at home \"once in awhile\" that this averaging 130 systolic. She has had as high as 140s systolic. Admits occasional palpitations.     GERD:  Current treatment includes protonix 40mg QD. This medication is working well to control her sx.     Past Medical History:     Past Medical History:   Diagnosis Date    Allergic rhinitis     Aphthous ulcer of mouth     CAD (coronary artery disease)     COVID-19 2022    H/O echocardiogram 2015    EF 65%. Aortic valve sclerosis without stenosis. Mild aortic regurgitation.  Myxomatous thickening of the mitral leaflets with Moderate mitral regurgitation. Mils (grade l) diastolic dysfunction.    H/O echocardiogram 2016    EF >60%.  Normal LV wall thickness and cavity size. No definite specific wall motion abnormalities were identified. Left atrium is mildly dilated (29-33) left atrial volume index of 31 ml/m2. Mild aortic stenosis. Myxomatous thickening of the mitral leaflets with moderate eccentric mitral regurg. Mild treicuspid regurg.  Mild diastolic dysfunction is seen.    H/O echocardiogram 2018    EF >55%. The LV wall thickness is mildly incrased. Moderate mitral

## 2024-12-06 ENCOUNTER — OFFICE VISIT (OUTPATIENT)
Dept: CARDIOLOGY | Age: 69
End: 2024-12-06

## 2024-12-06 VITALS
RESPIRATION RATE: 18 BRPM | OXYGEN SATURATION: 99 % | HEART RATE: 65 BPM | DIASTOLIC BLOOD PRESSURE: 71 MMHG | HEIGHT: 64 IN | BODY MASS INDEX: 33.73 KG/M2 | SYSTOLIC BLOOD PRESSURE: 134 MMHG | WEIGHT: 197.6 LBS

## 2024-12-06 DIAGNOSIS — I25.10 ASHD (ARTERIOSCLEROTIC HEART DISEASE): Primary | ICD-10-CM

## 2024-12-06 DIAGNOSIS — I10 PRIMARY HYPERTENSION: ICD-10-CM

## 2024-12-06 DIAGNOSIS — Z95.820 S/P ANGIOPLASTY WITH STENT: ICD-10-CM

## 2024-12-06 DIAGNOSIS — E66.811 CLASS 1 OBESITY WITH BODY MASS INDEX (BMI) OF 33.0 TO 33.9 IN ADULT, UNSPECIFIED OBESITY TYPE, UNSPECIFIED WHETHER SERIOUS COMORBIDITY PRESENT: ICD-10-CM

## 2024-12-06 DIAGNOSIS — I20.9 ANGINA, CLASS III (HCC): ICD-10-CM

## 2024-12-06 DIAGNOSIS — I35.0 MILD AORTIC STENOSIS: ICD-10-CM

## 2024-12-06 DIAGNOSIS — Z86.16 HISTORY OF COVID-19: ICD-10-CM

## 2024-12-06 DIAGNOSIS — R06.02 SOB (SHORTNESS OF BREATH): ICD-10-CM

## 2024-12-06 NOTE — PROGRESS NOTES
process the conversation to generate a clinical note, and support improvement of the AI technology. The patient (or guardian, if applicable) and other individuals in attendance at the appointment consented to the use of AI, including the recording.

## 2024-12-23 ENCOUNTER — HOSPITAL ENCOUNTER (OUTPATIENT)
Age: 69
Discharge: HOME | End: 2024-12-25
Attending: FAMILY MEDICINE
Payer: MEDICARE

## 2024-12-23 ENCOUNTER — HOSPITAL ENCOUNTER (OUTPATIENT)
Dept: NUCLEAR MEDICINE | Age: 69
Discharge: HOME | End: 2024-12-25
Attending: FAMILY MEDICINE
Payer: MEDICARE

## 2024-12-23 VITALS
HEIGHT: 64 IN | BODY MASS INDEX: 33.72 KG/M2 | SYSTOLIC BLOOD PRESSURE: 134 MMHG | WEIGHT: 197.53 LBS | DIASTOLIC BLOOD PRESSURE: 71 MMHG

## 2024-12-23 DIAGNOSIS — I10 PRIMARY HYPERTENSION: ICD-10-CM

## 2024-12-23 DIAGNOSIS — I20.9 ANGINA, CLASS III (HCC): ICD-10-CM

## 2024-12-23 DIAGNOSIS — Z86.16 HISTORY OF COVID-19: ICD-10-CM

## 2024-12-23 DIAGNOSIS — I25.10 ASHD (ARTERIOSCLEROTIC HEART DISEASE): ICD-10-CM

## 2024-12-23 DIAGNOSIS — E66.811 CLASS 1 OBESITY WITH BODY MASS INDEX (BMI) OF 33.0 TO 33.9 IN ADULT, UNSPECIFIED OBESITY TYPE, UNSPECIFIED WHETHER SERIOUS COMORBIDITY PRESENT: ICD-10-CM

## 2024-12-23 DIAGNOSIS — Z95.820 S/P ANGIOPLASTY WITH STENT: ICD-10-CM

## 2024-12-23 DIAGNOSIS — I35.0 MILD AORTIC STENOSIS: ICD-10-CM

## 2024-12-23 DIAGNOSIS — R06.02 SOB (SHORTNESS OF BREATH): ICD-10-CM

## 2024-12-23 LAB
ECHO AO ASC DIAM: 2.4 CM
ECHO AO ASCENDING AORTA INDEX: 1.23 CM/M2
ECHO AO ROOT DIAM: 1.4 CM
ECHO AO ROOT INDEX: 0.72 CM/M2
ECHO AO SINUS VALSALVA DIAM: 2.4 CM
ECHO AO SINUS VALSALVA INDEX: 1.23 CM/M2
ECHO AO ST JNCT DIAM: 1.7 CM
ECHO AR MAX VEL PISA: 4.2 M/S
ECHO AV AREA PEAK VELOCITY: 1.6 CM2
ECHO AV AREA VTI: 1.6 CM2
ECHO AV AREA/BSA PEAK VELOCITY: 0.8 CM2/M2
ECHO AV AREA/BSA VTI: 0.8 CM2/M2
ECHO AV CUSP MM: 1.6 CM
ECHO AV MEAN GRADIENT: 14 MMHG
ECHO AV MEAN VELOCITY: 1.7 M/S
ECHO AV PEAK GRADIENT: 26 MMHG
ECHO AV PEAK VELOCITY: 2.6 M/S
ECHO AV REGURGITANT PHT: 552 MS
ECHO AV VELOCITY RATIO: 0.5
ECHO AV VTI: 62.1 CM
ECHO BSA: 2.01 M2
ECHO EST RA PRESSURE: 3 MMHG
ECHO LA AREA 2C: 20.2 CM2
ECHO LA AREA 4C: 18.4 CM2
ECHO LA MAJOR AXIS: 6 CM
ECHO LA MINOR AXIS: 5.5 CM
ECHO LA VOL BP: 55 ML (ref 22–52)
ECHO LA VOL MOD A2C: 62 ML (ref 22–52)
ECHO LA VOL MOD A4C: 45 ML (ref 22–52)
ECHO LA VOL/BSA BIPLANE: 28 ML/M2 (ref 16–34)
ECHO LA VOLUME INDEX MOD A2C: 32 ML/M2 (ref 16–34)
ECHO LA VOLUME INDEX MOD A4C: 23 ML/M2 (ref 16–34)
ECHO LV E' LATERAL VELOCITY: 7.72 CM/S
ECHO LV EDV A2C: 64 ML
ECHO LV EDV A4C: 67 ML
ECHO LV EDV INDEX A4C: 34 ML/M2
ECHO LV EDV NDEX A2C: 33 ML/M2
ECHO LV EJECTION FRACTION A2C: 63 %
ECHO LV EJECTION FRACTION A4C: 60 %
ECHO LV EJECTION FRACTION BIPLANE: 64 % (ref 55–100)
ECHO LV ESV A2C: 24 ML
ECHO LV ESV A4C: 27 ML
ECHO LV ESV INDEX A2C: 12 ML/M2
ECHO LV ESV INDEX A4C: 14 ML/M2
ECHO LV FRACTIONAL SHORTENING: 33 % (ref 28–44)
ECHO LV INTERNAL DIMENSION DIASTOLE INDEX: 2.46 CM/M2
ECHO LV INTERNAL DIMENSION DIASTOLIC: 4.8 CM (ref 3.9–5.3)
ECHO LV INTERNAL DIMENSION SYSTOLIC INDEX: 1.64 CM/M2
ECHO LV INTERNAL DIMENSION SYSTOLIC: 3.2 CM
ECHO LV IVSD: 0.9 CM (ref 0.6–0.9)
ECHO LV MASS 2D: 147.8 G (ref 67–162)
ECHO LV MASS INDEX 2D: 75.8 G/M2 (ref 43–95)
ECHO LV POSTERIOR WALL DIASTOLIC: 0.9 CM (ref 0.6–0.9)
ECHO LV RELATIVE WALL THICKNESS RATIO: 0.38
ECHO LVOT AREA: 3.1 CM2
ECHO LVOT AV VTI INDEX: 0.51
ECHO LVOT DIAM: 2 CM
ECHO LVOT MEAN GRADIENT: 3 MMHG
ECHO LVOT PEAK GRADIENT: 7 MMHG
ECHO LVOT PEAK VELOCITY: 1.3 M/S
ECHO LVOT STROKE VOLUME INDEX: 50.6 ML/M2
ECHO LVOT SV: 98.6 ML
ECHO LVOT VTI: 31.4 CM
ECHO MV A VELOCITY: 1.11 M/S
ECHO MV E DECELERATION TIME (DT): 264 MS
ECHO MV E VELOCITY: 0.82 M/S
ECHO MV E/A RATIO: 0.74
ECHO MV E/E' LATERAL: 10.62
ECHO PULMONARY ARTERY END DIASTOLIC PRESSURE: 1 MMHG
ECHO PV MAX VELOCITY: 1.1 M/S
ECHO PV PEAK GRADIENT: 5 MMHG
ECHO PV REGURGITANT MAX VELOCITY: 0.6 M/S
ECHO RIGHT VENTRICULAR SYSTOLIC PRESSURE (RVSP): 24 MMHG
ECHO TV REGURGITANT MAX VELOCITY: 2.27 M/S
ECHO TV REGURGITANT PEAK GRADIENT: 21 MMHG

## 2024-12-23 PROCEDURE — A9500 TC99M SESTAMIBI: HCPCS | Performed by: FAMILY MEDICINE

## 2024-12-23 PROCEDURE — 93306 TTE W/DOPPLER COMPLETE: CPT | Performed by: FAMILY MEDICINE

## 2024-12-23 PROCEDURE — 3430000000 HC RX DIAGNOSTIC RADIOPHARMACEUTICAL: Performed by: FAMILY MEDICINE

## 2024-12-23 PROCEDURE — 93017 CV STRESS TEST TRACING ONLY: CPT

## 2024-12-23 PROCEDURE — 6360000002 HC RX W HCPCS: Performed by: FAMILY MEDICINE

## 2024-12-23 PROCEDURE — 93306 TTE W/DOPPLER COMPLETE: CPT

## 2024-12-23 RX ORDER — TETRAKIS(2-METHOXYISOBUTYLISOCYANIDE)COPPER(I) TETRAFLUOROBORATE 1 MG/ML
30 INJECTION, POWDER, LYOPHILIZED, FOR SOLUTION INTRAVENOUS
Status: COMPLETED | OUTPATIENT
Start: 2024-12-23 | End: 2024-12-23

## 2024-12-23 RX ORDER — REGADENOSON 0.08 MG/ML
0.4 INJECTION, SOLUTION INTRAVENOUS
Status: COMPLETED | OUTPATIENT
Start: 2024-12-23 | End: 2024-12-23

## 2024-12-23 RX ADMIN — Medication 30 MILLICURIE: at 10:00

## 2024-12-23 RX ADMIN — REGADENOSON 0.4 MG: 0.08 INJECTION, SOLUTION INTRAVENOUS at 10:09

## 2024-12-24 ENCOUNTER — TELEPHONE (OUTPATIENT)
Dept: CARDIOLOGY | Age: 69
End: 2024-12-24

## 2024-12-24 ENCOUNTER — HOSPITAL ENCOUNTER (OUTPATIENT)
Dept: NUCLEAR MEDICINE | Age: 69
Discharge: HOME | End: 2024-12-26
Attending: FAMILY MEDICINE
Payer: MEDICARE

## 2024-12-24 LAB
ECHO BSA: 2.01 M2
NUC STRESS EJECTION FRACTION: 77 %
STRESS BASELINE DIAS BP: 62 MMHG
STRESS BASELINE HR: 57 BPM
STRESS BASELINE SYS BP: 138 MMHG
STRESS ESTIMATED WORKLOAD: 1 METS
STRESS PEAK DIAS BP: 70 MMHG
STRESS PEAK SYS BP: 148 MMHG
STRESS PERCENT HR ACHIEVED: 69 %
STRESS POST PEAK HR: 104 BPM
STRESS RATE PRESSURE PRODUCT: NORMAL BPM*MMHG
STRESS TARGET HR: 150 BPM

## 2024-12-24 PROCEDURE — 93016 CV STRESS TEST SUPVJ ONLY: CPT | Performed by: FAMILY MEDICINE

## 2024-12-24 PROCEDURE — 3430000000 HC RX DIAGNOSTIC RADIOPHARMACEUTICAL: Performed by: FAMILY MEDICINE

## 2024-12-24 PROCEDURE — A9500 TC99M SESTAMIBI: HCPCS | Performed by: FAMILY MEDICINE

## 2024-12-24 PROCEDURE — 93018 CV STRESS TEST I&R ONLY: CPT | Performed by: FAMILY MEDICINE

## 2024-12-24 PROCEDURE — 78452 HT MUSCLE IMAGE SPECT MULT: CPT | Performed by: FAMILY MEDICINE

## 2024-12-24 RX ORDER — TETRAKIS(2-METHOXYISOBUTYLISOCYANIDE)COPPER(I) TETRAFLUOROBORATE 1 MG/ML
30 INJECTION, POWDER, LYOPHILIZED, FOR SOLUTION INTRAVENOUS
Status: COMPLETED | OUTPATIENT
Start: 2024-12-24 | End: 2024-12-24

## 2024-12-24 RX ADMIN — Medication 30 MILLICURIE: at 13:01

## 2024-12-24 NOTE — TELEPHONE ENCOUNTER
----- Message from Dr. Dk Jefferson MD sent at 12/23/2024 11:06 PM EST -----  Please let Ms. Mcknight know that their recent test results are largely unremarkable and/or relatively normal for them. No further action is needed at this time. Please continue with your current care plan.

## 2024-12-25 NOTE — RESULT ENCOUNTER NOTE
Please let patient know that their testing was abnormal and I would like to discuss this with them in the next few weeks if possible. Please make an appointment for MsTed Juan Luis with me in the next 2-3 weeks. With Me or Ronel is fine. Thanks.    Dk Jefferson MD, MS, F.A.C.C.

## 2024-12-26 ENCOUNTER — TELEPHONE (OUTPATIENT)
Dept: CARDIOLOGY | Age: 69
End: 2024-12-26

## 2024-12-26 NOTE — TELEPHONE ENCOUNTER
----- Message from Dr. Dk Jefferson MD sent at 12/25/2024 12:42 AM EST -----  Please let patient know that their testing was abnormal and I would like to discuss this with them in the next few weeks if possible. Please make an appointment for Ms. Juan Luis with me in the next 2-3 weeks. With Me or Ronel is fine. Thanks.    Dk Jefferson MD, MS, F.A.C.C.

## 2025-01-06 ENCOUNTER — HOSPITAL ENCOUNTER (OUTPATIENT)
Age: 70
Discharge: HOME | End: 2025-01-06
Payer: MEDICARE

## 2025-01-06 ENCOUNTER — OFFICE VISIT (OUTPATIENT)
Dept: CARDIOLOGY | Age: 70
End: 2025-01-06
Payer: MEDICARE

## 2025-01-06 VITALS
DIASTOLIC BLOOD PRESSURE: 72 MMHG | WEIGHT: 197 LBS | BODY MASS INDEX: 33.63 KG/M2 | SYSTOLIC BLOOD PRESSURE: 125 MMHG | RESPIRATION RATE: 18 BRPM | HEIGHT: 64 IN | HEART RATE: 66 BPM

## 2025-01-06 DIAGNOSIS — I35.0 MILD AORTIC STENOSIS: ICD-10-CM

## 2025-01-06 DIAGNOSIS — Z95.820 S/P ANGIOPLASTY WITH STENT: ICD-10-CM

## 2025-01-06 DIAGNOSIS — R07.89 CHEST DISCOMFORT: ICD-10-CM

## 2025-01-06 DIAGNOSIS — R94.39 ABNORMAL STRESS TEST: ICD-10-CM

## 2025-01-06 DIAGNOSIS — I07.1 MODERATE TRICUSPID REGURGITATION: ICD-10-CM

## 2025-01-06 DIAGNOSIS — I10 PRIMARY HYPERTENSION: ICD-10-CM

## 2025-01-06 DIAGNOSIS — I25.10 ASHD (ARTERIOSCLEROTIC HEART DISEASE): ICD-10-CM

## 2025-01-06 DIAGNOSIS — R06.02 SOB (SHORTNESS OF BREATH): ICD-10-CM

## 2025-01-06 LAB
ANION GAP SERPL CALCULATED.3IONS-SCNC: 10 MMOL/L (ref 9–16)
BUN SERPL-MCNC: 12 MG/DL (ref 8–23)
BUN/CREAT SERPL: 15 (ref 9–20)
CALCIUM SERPL-MCNC: 9.2 MG/DL (ref 8.6–10.4)
CHLORIDE SERPL-SCNC: 102 MMOL/L (ref 98–107)
CO2 SERPL-SCNC: 31 MMOL/L (ref 20–31)
CREAT SERPL-MCNC: 0.8 MG/DL (ref 0.5–0.9)
ERYTHROCYTE [DISTWIDTH] IN BLOOD BY AUTOMATED COUNT: 12.4 % (ref 11.8–14.4)
GFR, ESTIMATED: 78 ML/MIN/1.73M2
GLUCOSE SERPL-MCNC: 92 MG/DL (ref 74–99)
HCT VFR BLD AUTO: 44.9 % (ref 36.3–47.1)
HGB BLD-MCNC: 14.9 G/DL (ref 11.9–15.1)
MCH RBC QN AUTO: 29.2 PG (ref 25.2–33.5)
MCHC RBC AUTO-ENTMCNC: 33.2 G/DL (ref 28.4–34.8)
MCV RBC AUTO: 88 FL (ref 82.6–102.9)
NRBC BLD-RTO: 0 PER 100 WBC
PLATELET # BLD AUTO: 214 K/UL (ref 138–453)
PMV BLD AUTO: 9.7 FL (ref 8.1–13.5)
POTASSIUM SERPL-SCNC: 4.3 MMOL/L (ref 3.7–5.3)
RBC # BLD AUTO: 5.1 M/UL (ref 3.95–5.11)
SODIUM SERPL-SCNC: 143 MMOL/L (ref 136–145)
WBC OTHER # BLD: 8.3 K/UL (ref 3.5–11.3)

## 2025-01-06 PROCEDURE — 3074F SYST BP LT 130 MM HG: CPT | Performed by: PHYSICIAN ASSISTANT

## 2025-01-06 PROCEDURE — 85027 COMPLETE CBC AUTOMATED: CPT

## 2025-01-06 PROCEDURE — 1123F ACP DISCUSS/DSCN MKR DOCD: CPT | Performed by: PHYSICIAN ASSISTANT

## 2025-01-06 PROCEDURE — 1159F MED LIST DOCD IN RCRD: CPT | Performed by: PHYSICIAN ASSISTANT

## 2025-01-06 PROCEDURE — 36415 COLL VENOUS BLD VENIPUNCTURE: CPT

## 2025-01-06 PROCEDURE — 99215 OFFICE O/P EST HI 40 MIN: CPT | Performed by: PHYSICIAN ASSISTANT

## 2025-01-06 PROCEDURE — 3078F DIAST BP <80 MM HG: CPT | Performed by: PHYSICIAN ASSISTANT

## 2025-01-06 PROCEDURE — 80048 BASIC METABOLIC PNL TOTAL CA: CPT

## 2025-01-06 RX ORDER — NITROGLYCERIN 0.4 MG/1
0.4 TABLET SUBLINGUAL EVERY 5 MIN PRN
Qty: 10 TABLET | Refills: 3 | Status: SHIPPED | OUTPATIENT
Start: 2025-01-06

## 2025-01-06 NOTE — PROGRESS NOTES
Subjective:     CHIEF COMPLAINT / HPI:    Chief Complaint   Patient presents with    Follow-up     HX:SOB, CP. Pt is here to follow up after abnormal stress test. Pt states she is feeling about the same. She still has cp, palps, sob and dizziness. Nothing has changed she reports.      Dear Stephania Ryan, AIDEE - CNP,    I had the pleasure of seeing Annabel Mcknight in consultation today.    Annabel Mcknight is 69 y.o. female with past medical history of hyperlipidemia, hypertension and mitral regurgitation. No history of coronary artery disease, myocardial infarction, heart failure or significant arrhythmia. Her father had heart disease starting at 55 years old. She is former smoker, 1 ppk a week. Negative stress Echo in 2015. Her risk factors for coronary artery disease includes hypertension, hyperlipidemia and family history of premature coronary artery disease. ECG done in office 9/17/2019- Normal sinus rhythm, normal ECG. Echo done on 8/6/2018- EF >55%. The LV wall thickness is mildly incrased. Moderate mitral regurg. Mild aortic stenosis. Mild aortic regurg. Mild tricuspid regurg. Evidence of mild (grade I) diastolic dysfucntion is seen.     Echo done on 2/22/23: Global left ventricular systolic function appears preserved with an estimated ejection fraction of 60%. The left ventricular cavity size is within normal limits and the left ventricular wall thickness is mildly increased. Aortic leaflet calcification with mild aortic stenosis with a mean gradient of 14 mmHg. Mild aortic regurgitation was seen. Mild to moderate posteriorly directed mitral regurgitation was seen. Mild pulmonary hypertension with an estimated right ventricular systolic pressure of 37 mmHg. Mild to moderate tricuspid regurgitation. Evidence of moderate diastolic dysfunction is seen.    Stress test done on 2/22/23:  Abnormal myocardial perfusion study. There is a small/moderate perfusion defect of mild intensity in the apical and

## 2025-01-07 ENCOUNTER — TELEPHONE (OUTPATIENT)
Dept: CARDIOLOGY | Age: 70
End: 2025-01-07

## 2025-01-07 PROBLEM — R94.39 ABNORMAL STRESS TEST: Status: ACTIVE | Noted: 2025-01-06

## 2025-01-07 NOTE — TELEPHONE ENCOUNTER
----- Message from Sulema Skelton PA-C sent at 1/7/2025  8:54 AM EST -----  Please notify patient that their lab results are normal.   Please continue current treatment and follow up.

## 2025-01-16 ENCOUNTER — HOSPITAL ENCOUNTER (OUTPATIENT)
Age: 70
Discharge: ANOTHER HOSPITAL | End: 2025-01-16
Attending: FAMILY MEDICINE | Admitting: FAMILY MEDICINE
Payer: MEDICARE

## 2025-01-16 ENCOUNTER — HOSPITAL ENCOUNTER (INPATIENT)
Age: 70
LOS: 2 days | Discharge: HOME | DRG: 322 | End: 2025-01-18
Attending: HOSPITALIST | Admitting: HOSPITALIST
Payer: MEDICARE

## 2025-01-16 VITALS
TEMPERATURE: 98.2 F | RESPIRATION RATE: 20 BRPM | DIASTOLIC BLOOD PRESSURE: 65 MMHG | SYSTOLIC BLOOD PRESSURE: 147 MMHG | HEART RATE: 67 BPM | OXYGEN SATURATION: 94 %

## 2025-01-16 DIAGNOSIS — I25.10 CORONARY ARTERY DISEASE, UNSPECIFIED VESSEL OR LESION TYPE, UNSPECIFIED WHETHER ANGINA PRESENT, UNSPECIFIED WHETHER NATIVE OR TRANSPLANTED HEART: Primary | ICD-10-CM

## 2025-01-16 DIAGNOSIS — I25.10 CORONARY ARTERY DISEASE: ICD-10-CM

## 2025-01-16 DIAGNOSIS — I20.0 UNSTABLE ANGINA (HCC): ICD-10-CM

## 2025-01-16 DIAGNOSIS — R94.39 ABNORMAL STRESS TEST: ICD-10-CM

## 2025-01-16 DIAGNOSIS — I25.10 CORONARY ARTERY DISEASE DUE TO LIPID RICH PLAQUE: ICD-10-CM

## 2025-01-16 DIAGNOSIS — I25.83 CORONARY ARTERY DISEASE DUE TO LIPID RICH PLAQUE: ICD-10-CM

## 2025-01-16 PROCEDURE — 2709999900 HC NON-CHARGEABLE SUPPLY: Performed by: FAMILY MEDICINE

## 2025-01-16 PROCEDURE — 6370000000 HC RX 637 (ALT 250 FOR IP): Performed by: HOSPITALIST

## 2025-01-16 PROCEDURE — 6360000002 HC RX W HCPCS: Performed by: FAMILY MEDICINE

## 2025-01-16 PROCEDURE — 2500000003 HC RX 250 WO HCPCS: Performed by: HOSPITALIST

## 2025-01-16 PROCEDURE — 2580000003 HC RX 258: Performed by: FAMILY MEDICINE

## 2025-01-16 PROCEDURE — C1894 INTRO/SHEATH, NON-LASER: HCPCS | Performed by: FAMILY MEDICINE

## 2025-01-16 PROCEDURE — C1769 GUIDE WIRE: HCPCS | Performed by: FAMILY MEDICINE

## 2025-01-16 PROCEDURE — 6360000004 HC RX CONTRAST MEDICATION: Performed by: FAMILY MEDICINE

## 2025-01-16 PROCEDURE — 2060000000 HC ICU INTERMEDIATE R&B

## 2025-01-16 PROCEDURE — 7100000010 HC PHASE II RECOVERY - FIRST 15 MIN: Performed by: FAMILY MEDICINE

## 2025-01-16 PROCEDURE — 7100000011 HC PHASE II RECOVERY - ADDTL 15 MIN: Performed by: FAMILY MEDICINE

## 2025-01-16 PROCEDURE — 2500000003 HC RX 250 WO HCPCS: Performed by: FAMILY MEDICINE

## 2025-01-16 PROCEDURE — 93458 L HRT ARTERY/VENTRICLE ANGIO: CPT | Performed by: FAMILY MEDICINE

## 2025-01-16 PROCEDURE — 99152 MOD SED SAME PHYS/QHP 5/>YRS: CPT | Performed by: FAMILY MEDICINE

## 2025-01-16 PROCEDURE — 94761 N-INVAS EAR/PLS OXIMETRY MLT: CPT

## 2025-01-16 PROCEDURE — 2580000003 HC RX 258: Performed by: HOSPITALIST

## 2025-01-16 RX ORDER — MIDAZOLAM HYDROCHLORIDE 1 MG/ML
INJECTION, SOLUTION INTRAMUSCULAR; INTRAVENOUS PRN
Status: DISCONTINUED | OUTPATIENT
Start: 2025-01-16 | End: 2025-01-16 | Stop reason: HOSPADM

## 2025-01-16 RX ORDER — ONDANSETRON 2 MG/ML
4 INJECTION INTRAMUSCULAR; INTRAVENOUS EVERY 6 HOURS PRN
Status: DISCONTINUED | OUTPATIENT
Start: 2025-01-16 | End: 2025-01-18 | Stop reason: HOSPADM

## 2025-01-16 RX ORDER — FLUTICASONE PROPIONATE 50 MCG
1 SPRAY, SUSPENSION (ML) NASAL DAILY
Status: DISCONTINUED | OUTPATIENT
Start: 2025-01-17 | End: 2025-01-18 | Stop reason: HOSPADM

## 2025-01-16 RX ORDER — SODIUM CHLORIDE 0.9 % (FLUSH) 0.9 %
5-40 SYRINGE (ML) INJECTION EVERY 12 HOURS SCHEDULED
Status: DISCONTINUED | OUTPATIENT
Start: 2025-01-16 | End: 2025-01-16 | Stop reason: HOSPADM

## 2025-01-16 RX ORDER — FUROSEMIDE 20 MG/1
40 TABLET ORAL DAILY
Status: DISCONTINUED | OUTPATIENT
Start: 2025-01-17 | End: 2025-01-18 | Stop reason: HOSPADM

## 2025-01-16 RX ORDER — ACETAMINOPHEN 650 MG/1
650 SUPPOSITORY RECTAL EVERY 6 HOURS PRN
Status: DISCONTINUED | OUTPATIENT
Start: 2025-01-16 | End: 2025-01-18 | Stop reason: HOSPADM

## 2025-01-16 RX ORDER — SODIUM CHLORIDE 0.9 % (FLUSH) 0.9 %
5-40 SYRINGE (ML) INJECTION PRN
Status: DISCONTINUED | OUTPATIENT
Start: 2025-01-16 | End: 2025-01-16 | Stop reason: HOSPADM

## 2025-01-16 RX ORDER — SODIUM CHLORIDE 0.9 % (FLUSH) 0.9 %
5-40 SYRINGE (ML) INJECTION EVERY 12 HOURS SCHEDULED
Status: DISCONTINUED | OUTPATIENT
Start: 2025-01-16 | End: 2025-01-18 | Stop reason: HOSPADM

## 2025-01-16 RX ORDER — POLYETHYLENE GLYCOL 3350 17 G/17G
17 POWDER, FOR SOLUTION ORAL DAILY PRN
Status: DISCONTINUED | OUTPATIENT
Start: 2025-01-16 | End: 2025-01-18 | Stop reason: HOSPADM

## 2025-01-16 RX ORDER — SODIUM CHLORIDE 9 MG/ML
INJECTION, SOLUTION INTRAVENOUS CONTINUOUS
Status: DISCONTINUED | OUTPATIENT
Start: 2025-01-16 | End: 2025-01-17

## 2025-01-16 RX ORDER — AMLODIPINE BESYLATE 5 MG/1
5 TABLET ORAL DAILY
Status: DISCONTINUED | OUTPATIENT
Start: 2025-01-17 | End: 2025-01-18 | Stop reason: HOSPADM

## 2025-01-16 RX ORDER — ATORVASTATIN CALCIUM 40 MG/1
80 TABLET, FILM COATED ORAL NIGHTLY
Status: DISCONTINUED | OUTPATIENT
Start: 2025-01-16 | End: 2025-01-18 | Stop reason: HOSPADM

## 2025-01-16 RX ORDER — LIDOCAINE HYDROCHLORIDE 10 MG/ML
INJECTION, SOLUTION INFILTRATION; PERINEURAL PRN
Status: DISCONTINUED | OUTPATIENT
Start: 2025-01-16 | End: 2025-01-16 | Stop reason: HOSPADM

## 2025-01-16 RX ORDER — ACETAMINOPHEN 325 MG/1
650 TABLET ORAL EVERY 6 HOURS PRN
Status: DISCONTINUED | OUTPATIENT
Start: 2025-01-16 | End: 2025-01-18 | Stop reason: HOSPADM

## 2025-01-16 RX ORDER — ASPIRIN 81 MG/1
81 TABLET ORAL DAILY
Status: DISCONTINUED | OUTPATIENT
Start: 2025-01-17 | End: 2025-01-17

## 2025-01-16 RX ORDER — VITAMIN B COMPLEX
2000 TABLET ORAL DAILY
Status: DISCONTINUED | OUTPATIENT
Start: 2025-01-17 | End: 2025-01-18 | Stop reason: HOSPADM

## 2025-01-16 RX ORDER — POTASSIUM CHLORIDE 1500 MG/1
40 TABLET, EXTENDED RELEASE ORAL PRN
Status: DISCONTINUED | OUTPATIENT
Start: 2025-01-16 | End: 2025-01-18 | Stop reason: HOSPADM

## 2025-01-16 RX ORDER — IOPAMIDOL 755 MG/ML
INJECTION, SOLUTION INTRAVASCULAR PRN
Status: DISCONTINUED | OUTPATIENT
Start: 2025-01-16 | End: 2025-01-16 | Stop reason: HOSPADM

## 2025-01-16 RX ORDER — SODIUM CHLORIDE 9 MG/ML
INJECTION, SOLUTION INTRAVENOUS PRN
Status: DISCONTINUED | OUTPATIENT
Start: 2025-01-16 | End: 2025-01-18 | Stop reason: HOSPADM

## 2025-01-16 RX ORDER — MAGNESIUM SULFATE IN WATER 40 MG/ML
2000 INJECTION, SOLUTION INTRAVENOUS PRN
Status: DISCONTINUED | OUTPATIENT
Start: 2025-01-16 | End: 2025-01-18 | Stop reason: HOSPADM

## 2025-01-16 RX ORDER — NITROGLYCERIN 0.4 MG/1
0.4 TABLET SUBLINGUAL EVERY 5 MIN PRN
Status: DISCONTINUED | OUTPATIENT
Start: 2025-01-16 | End: 2025-01-18 | Stop reason: HOSPADM

## 2025-01-16 RX ORDER — ACETAMINOPHEN 325 MG/1
650 TABLET ORAL EVERY 4 HOURS PRN
Status: DISCONTINUED | OUTPATIENT
Start: 2025-01-16 | End: 2025-01-16 | Stop reason: HOSPADM

## 2025-01-16 RX ORDER — HEPARIN SODIUM 1000 [USP'U]/ML
INJECTION, SOLUTION INTRAVENOUS; SUBCUTANEOUS PRN
Status: DISCONTINUED | OUTPATIENT
Start: 2025-01-16 | End: 2025-01-16 | Stop reason: HOSPADM

## 2025-01-16 RX ORDER — SODIUM CHLORIDE 9 MG/ML
INJECTION, SOLUTION INTRAVENOUS PRN
Status: DISCONTINUED | OUTPATIENT
Start: 2025-01-16 | End: 2025-01-16 | Stop reason: HOSPADM

## 2025-01-16 RX ORDER — ASCORBIC ACID 500 MG
1000 TABLET ORAL DAILY
Status: DISCONTINUED | OUTPATIENT
Start: 2025-01-17 | End: 2025-01-18 | Stop reason: HOSPADM

## 2025-01-16 RX ORDER — CLOPIDOGREL BISULFATE 75 MG/1
75 TABLET ORAL DAILY
Status: DISCONTINUED | OUTPATIENT
Start: 2025-01-17 | End: 2025-01-17

## 2025-01-16 RX ORDER — NITROGLYCERIN 0.4 MG/1
0.4 TABLET SUBLINGUAL EVERY 5 MIN PRN
Status: DISCONTINUED | OUTPATIENT
Start: 2025-01-16 | End: 2025-01-16 | Stop reason: HOSPADM

## 2025-01-16 RX ORDER — MULTIVITAMIN WITH IRON
500 TABLET ORAL DAILY
Status: DISCONTINUED | OUTPATIENT
Start: 2025-01-17 | End: 2025-01-18 | Stop reason: HOSPADM

## 2025-01-16 RX ORDER — SODIUM CHLORIDE 9 MG/ML
INJECTION, SOLUTION INTRAVENOUS CONTINUOUS
Status: DISCONTINUED | OUTPATIENT
Start: 2025-01-16 | End: 2025-01-16 | Stop reason: HOSPADM

## 2025-01-16 RX ORDER — PANTOPRAZOLE SODIUM 20 MG/1
20 TABLET, DELAYED RELEASE ORAL
Status: DISCONTINUED | OUTPATIENT
Start: 2025-01-17 | End: 2025-01-18 | Stop reason: HOSPADM

## 2025-01-16 RX ORDER — SODIUM CHLORIDE 0.9 % (FLUSH) 0.9 %
5-40 SYRINGE (ML) INJECTION PRN
Status: DISCONTINUED | OUTPATIENT
Start: 2025-01-16 | End: 2025-01-18 | Stop reason: HOSPADM

## 2025-01-16 RX ORDER — VERAPAMIL HYDROCHLORIDE 2.5 MG/ML
INJECTION, SOLUTION INTRAVENOUS PRN
Status: DISCONTINUED | OUTPATIENT
Start: 2025-01-16 | End: 2025-01-16 | Stop reason: HOSPADM

## 2025-01-16 RX ORDER — ENOXAPARIN SODIUM 100 MG/ML
40 INJECTION SUBCUTANEOUS DAILY
Status: DISCONTINUED | OUTPATIENT
Start: 2025-01-17 | End: 2025-01-18 | Stop reason: HOSPADM

## 2025-01-16 RX ORDER — ONDANSETRON 4 MG/1
4 TABLET, ORALLY DISINTEGRATING ORAL EVERY 8 HOURS PRN
Status: DISCONTINUED | OUTPATIENT
Start: 2025-01-16 | End: 2025-01-18 | Stop reason: HOSPADM

## 2025-01-16 RX ORDER — DIPHENHYDRAMINE HCL 25 MG
50 CAPSULE ORAL ONCE
Status: DISCONTINUED | OUTPATIENT
Start: 2025-01-16 | End: 2025-01-16 | Stop reason: HOSPADM

## 2025-01-16 RX ORDER — METOPROLOL SUCCINATE 50 MG/1
50 TABLET, EXTENDED RELEASE ORAL DAILY
Status: DISCONTINUED | OUTPATIENT
Start: 2025-01-17 | End: 2025-01-18 | Stop reason: HOSPADM

## 2025-01-16 RX ORDER — POTASSIUM CHLORIDE 7.45 MG/ML
10 INJECTION INTRAVENOUS PRN
Status: DISCONTINUED | OUTPATIENT
Start: 2025-01-16 | End: 2025-01-18 | Stop reason: HOSPADM

## 2025-01-16 RX ORDER — NITROGLYCERIN 20 MG/100ML
INJECTION INTRAVENOUS PRN
Status: DISCONTINUED | OUTPATIENT
Start: 2025-01-16 | End: 2025-01-16 | Stop reason: HOSPADM

## 2025-01-16 RX ADMIN — ACETAMINOPHEN 650 MG: 325 TABLET ORAL at 21:11

## 2025-01-16 RX ADMIN — SODIUM CHLORIDE, PRESERVATIVE FREE 10 ML: 5 INJECTION INTRAVENOUS at 21:17

## 2025-01-16 RX ADMIN — SODIUM CHLORIDE: 9 INJECTION, SOLUTION INTRAVENOUS at 21:17

## 2025-01-16 RX ADMIN — SODIUM CHLORIDE: 9 INJECTION, SOLUTION INTRAVENOUS at 13:25

## 2025-01-16 RX ADMIN — ATORVASTATIN CALCIUM 80 MG: 40 TABLET, FILM COATED ORAL at 21:11

## 2025-01-16 ASSESSMENT — PAIN DESCRIPTION - DESCRIPTORS: DESCRIPTORS: ACHING

## 2025-01-16 ASSESSMENT — PAIN DESCRIPTION - LOCATION: LOCATION: HEAD

## 2025-01-16 ASSESSMENT — PAIN SCALES - GENERAL
PAINLEVEL_OUTOF10: 5
PAINLEVEL_OUTOF10: 0

## 2025-01-16 NOTE — PROGRESS NOTES
Transferred to room 310 per wheelchair, all belongings sent with patient.  Spouse accompanied to room.

## 2025-01-16 NOTE — DISCHARGE INSTRUCTIONS
test to flush the x-ray dye from your system.   Return to your normal diet.   No alcoholic beverages for 24 hours after the procedure.  Physical Activity   The sedative will make you sleepy. Rest until the effects have worn off.   Nausea and vomiting from the sedative is normal and usually does not last long.  Ask your doctor when you will be able to return to work.   Do not drive, operate machinery, do anything that requires attention to detail, or sign important papers for at least 24 hours or until your doctor says it is safe.   Avoid heavy lifting, physically demanding activities, and sexual activity for 2-3 days. Lift nothing over 10 pounds (a gallon of milk is 8 pounds).  Do not sit for long periods of time. Try to change positions frequently.   Medications   Resume taking your normal medicines as advised.   Use acetaminophen (Tylenol) for pain relief.  (Avoid anti-inflammatory drugs such as- ibuprofen (Advil, Motrin), naproxen sodium (Aleve), Excedrin for a few days)  If you had to stop taking these medications before the procedure, ask your doctor when you can resume taking them:   Anti-inflammatory drugs   Blood thinners, such as warfarin (Coumadin)   If you are taking medicines, follow these general guidelines:   Take your medicine as directed. Do not change the amount or the schedule.   Do not stop taking them without talking to your doctor.   Do not share them.   Know the side effects and report any to your doctor.   Some drugs can be dangerous when mixed. Talk to a doctor or pharmacist if you are taking more than one drug. This includes over-the-counter medicine and herb or dietary supplements.   Plan ahead for refills so you don't run out.   Follow-up   The test results are available right after the procedure. At that point, the doctor will discuss the findings and suggest appropriate treatment options. In some cases, the results can indicate an immediate need for surgery. Schedule a follow-up

## 2025-01-16 NOTE — PROGRESS NOTES
[facility] Access called, new case initiated. Transfer to [facility] d/t severe CAD and cardiac intervention per Dr. Cox. Will continue to monitor.

## 2025-01-16 NOTE — PROGRESS NOTES
Room assignment at [facility] received from [email or web address].  Patient and spouse informed.  ETA on transport 1930.

## 2025-01-17 LAB
ACT BLD: 263 SEC (ref 79–149)
ANION GAP SERPL CALCULATED.3IONS-SCNC: 8 MMOL/L (ref 9–17)
BUN SERPL-MCNC: 10 MG/DL (ref 8–23)
CALCIUM SERPL-MCNC: 8.9 MG/DL (ref 8.6–10.4)
CHLORIDE SERPL-SCNC: 108 MMOL/L (ref 98–107)
CO2 SERPL-SCNC: 28 MMOL/L (ref 20–31)
CREAT SERPL-MCNC: 0.6 MG/DL (ref 0.5–0.9)
ERYTHROCYTE [DISTWIDTH] IN BLOOD BY AUTOMATED COUNT: 13.2 % (ref 12.5–15.4)
GFR, ESTIMATED: >90 ML/MIN/1.73M2
GLUCOSE SERPL-MCNC: 94 MG/DL (ref 70–99)
HCT VFR BLD AUTO: 39.6 % (ref 36–46)
HGB BLD-MCNC: 13.6 G/DL (ref 12–16)
MCH RBC QN AUTO: 30 PG (ref 26–34)
MCHC RBC AUTO-ENTMCNC: 34.4 G/DL (ref 31–37)
MCV RBC AUTO: 87.1 FL (ref 80–100)
PLATELET # BLD AUTO: 200 K/UL (ref 140–450)
PMV BLD AUTO: 8.1 FL (ref 6–12)
POTASSIUM SERPL-SCNC: 3.8 MMOL/L (ref 3.7–5.3)
RBC # BLD AUTO: 4.55 M/UL (ref 4–5.2)
SODIUM SERPL-SCNC: 144 MMOL/L (ref 135–144)
WBC OTHER # BLD: 5.5 K/UL (ref 3.5–11)

## 2025-01-17 PROCEDURE — 6370000000 HC RX 637 (ALT 250 FOR IP): Performed by: INTERNAL MEDICINE

## 2025-01-17 PROCEDURE — 027034Z DILATION OF CORONARY ARTERY, ONE ARTERY WITH DRUG-ELUTING INTRALUMINAL DEVICE, PERCUTANEOUS APPROACH: ICD-10-PCS | Performed by: INTERNAL MEDICINE

## 2025-01-17 PROCEDURE — C1769 GUIDE WIRE: HCPCS | Performed by: INTERNAL MEDICINE

## 2025-01-17 PROCEDURE — 85027 COMPLETE CBC AUTOMATED: CPT

## 2025-01-17 PROCEDURE — C1874 STENT, COATED/COV W/DEL SYS: HCPCS | Performed by: INTERNAL MEDICINE

## 2025-01-17 PROCEDURE — 6360000002 HC RX W HCPCS: Performed by: HOSPITALIST

## 2025-01-17 PROCEDURE — 99152 MOD SED SAME PHYS/QHP 5/>YRS: CPT | Performed by: INTERNAL MEDICINE

## 2025-01-17 PROCEDURE — 80048 BASIC METABOLIC PNL TOTAL CA: CPT

## 2025-01-17 PROCEDURE — 99153 MOD SED SAME PHYS/QHP EA: CPT | Performed by: INTERNAL MEDICINE

## 2025-01-17 PROCEDURE — C9600 PERC DRUG-EL COR STENT SING: HCPCS | Performed by: INTERNAL MEDICINE

## 2025-01-17 PROCEDURE — 85347 COAGULATION TIME ACTIVATED: CPT

## 2025-01-17 PROCEDURE — C1894 INTRO/SHEATH, NON-LASER: HCPCS | Performed by: INTERNAL MEDICINE

## 2025-01-17 PROCEDURE — B240ZZ3 ULTRASONOGRAPHY OF SINGLE CORONARY ARTERY, INTRAVASCULAR: ICD-10-PCS | Performed by: INTERNAL MEDICINE

## 2025-01-17 PROCEDURE — 2500000003 HC RX 250 WO HCPCS: Performed by: INTERNAL MEDICINE

## 2025-01-17 PROCEDURE — 92921 HC PRQ CARDIAC ANGIO ADDL ART: CPT | Performed by: INTERNAL MEDICINE

## 2025-01-17 PROCEDURE — 2709999900 HC NON-CHARGEABLE SUPPLY: Performed by: INTERNAL MEDICINE

## 2025-01-17 PROCEDURE — 6360000004 HC RX CONTRAST MEDICATION: Performed by: INTERNAL MEDICINE

## 2025-01-17 PROCEDURE — 93454 CORONARY ARTERY ANGIO S&I: CPT | Performed by: INTERNAL MEDICINE

## 2025-01-17 PROCEDURE — 6370000000 HC RX 637 (ALT 250 FOR IP): Performed by: HOSPITALIST

## 2025-01-17 PROCEDURE — 2500000003 HC RX 250 WO HCPCS: Performed by: HOSPITALIST

## 2025-01-17 PROCEDURE — C1725 CATH, TRANSLUMIN NON-LASER: HCPCS | Performed by: INTERNAL MEDICINE

## 2025-01-17 PROCEDURE — 99222 1ST HOSP IP/OBS MODERATE 55: CPT | Performed by: HOSPITALIST

## 2025-01-17 PROCEDURE — C2623 CATH, TRANSLUMIN, DRUG-COAT: HCPCS | Performed by: INTERNAL MEDICINE

## 2025-01-17 PROCEDURE — 36415 COLL VENOUS BLD VENIPUNCTURE: CPT

## 2025-01-17 PROCEDURE — 02703ZZ DILATION OF CORONARY ARTERY, ONE ARTERY, PERCUTANEOUS APPROACH: ICD-10-PCS | Performed by: INTERNAL MEDICINE

## 2025-01-17 PROCEDURE — C1887 CATHETER, GUIDING: HCPCS | Performed by: INTERNAL MEDICINE

## 2025-01-17 PROCEDURE — 92978 ENDOLUMINL IVUS OCT C 1ST: CPT | Performed by: INTERNAL MEDICINE

## 2025-01-17 PROCEDURE — C1753 CATH, INTRAVAS ULTRASOUND: HCPCS | Performed by: INTERNAL MEDICINE

## 2025-01-17 PROCEDURE — 6360000002 HC RX W HCPCS: Performed by: INTERNAL MEDICINE

## 2025-01-17 PROCEDURE — 92979 ENDOLUMINL IVUS OCT C EA: CPT | Performed by: INTERNAL MEDICINE

## 2025-01-17 PROCEDURE — 2060000000 HC ICU INTERMEDIATE R&B

## 2025-01-17 DEVICE — STENT CORONARY ONYX FRONTIER RX 2X18 MM ZOTAROLIMUS ELUT: Type: IMPLANTABLE DEVICE | Status: FUNCTIONAL

## 2025-01-17 RX ORDER — IOPAMIDOL 755 MG/ML
INJECTION, SOLUTION INTRAVASCULAR PRN
Status: DISCONTINUED | OUTPATIENT
Start: 2025-01-17 | End: 2025-01-17 | Stop reason: HOSPADM

## 2025-01-17 RX ORDER — LIDOCAINE HYDROCHLORIDE 10 MG/ML
INJECTION, SOLUTION INFILTRATION; PERINEURAL PRN
Status: DISCONTINUED | OUTPATIENT
Start: 2025-01-17 | End: 2025-01-17 | Stop reason: HOSPADM

## 2025-01-17 RX ORDER — SODIUM CHLORIDE 0.9 % (FLUSH) 0.9 %
5-40 SYRINGE (ML) INJECTION EVERY 12 HOURS SCHEDULED
Status: DISCONTINUED | OUTPATIENT
Start: 2025-01-17 | End: 2025-01-17 | Stop reason: HOSPADM

## 2025-01-17 RX ORDER — ASPIRIN 81 MG/1
81 TABLET ORAL DAILY
Status: DISCONTINUED | OUTPATIENT
Start: 2025-01-18 | End: 2025-01-18 | Stop reason: HOSPADM

## 2025-01-17 RX ORDER — NITROGLYCERIN 20 MG/100ML
INJECTION INTRAVENOUS PRN
Status: DISCONTINUED | OUTPATIENT
Start: 2025-01-17 | End: 2025-01-17 | Stop reason: HOSPADM

## 2025-01-17 RX ORDER — SODIUM CHLORIDE 9 MG/ML
INJECTION, SOLUTION INTRAVENOUS PRN
Status: DISCONTINUED | OUTPATIENT
Start: 2025-01-17 | End: 2025-01-17 | Stop reason: HOSPADM

## 2025-01-17 RX ORDER — HEPARIN SODIUM 1000 [USP'U]/ML
INJECTION, SOLUTION INTRAVENOUS; SUBCUTANEOUS PRN
Status: DISCONTINUED | OUTPATIENT
Start: 2025-01-17 | End: 2025-01-17 | Stop reason: HOSPADM

## 2025-01-17 RX ORDER — SODIUM CHLORIDE 0.9 % (FLUSH) 0.9 %
5-40 SYRINGE (ML) INJECTION PRN
Status: DISCONTINUED | OUTPATIENT
Start: 2025-01-17 | End: 2025-01-17 | Stop reason: HOSPADM

## 2025-01-17 RX ORDER — ACETAMINOPHEN 325 MG/1
650 TABLET ORAL EVERY 4 HOURS PRN
OUTPATIENT
Start: 2025-01-17

## 2025-01-17 RX ORDER — FENTANYL CITRATE 50 UG/ML
INJECTION, SOLUTION INTRAMUSCULAR; INTRAVENOUS PRN
Status: DISCONTINUED | OUTPATIENT
Start: 2025-01-17 | End: 2025-01-17 | Stop reason: HOSPADM

## 2025-01-17 RX ORDER — ACETAMINOPHEN 325 MG/1
TABLET ORAL PRN
Status: DISCONTINUED | OUTPATIENT
Start: 2025-01-17 | End: 2025-01-17 | Stop reason: HOSPADM

## 2025-01-17 RX ORDER — MIDAZOLAM HYDROCHLORIDE 1 MG/ML
INJECTION, SOLUTION INTRAMUSCULAR; INTRAVENOUS PRN
Status: DISCONTINUED | OUTPATIENT
Start: 2025-01-17 | End: 2025-01-17 | Stop reason: HOSPADM

## 2025-01-17 RX ORDER — SODIUM CHLORIDE 9 MG/ML
INJECTION, SOLUTION INTRAVENOUS CONTINUOUS
OUTPATIENT
Start: 2025-01-17 | End: 2025-01-17

## 2025-01-17 RX ADMIN — Medication 2000 UNITS: at 10:34

## 2025-01-17 RX ADMIN — SODIUM CHLORIDE, PRESERVATIVE FREE 10 ML: 5 INJECTION INTRAVENOUS at 19:44

## 2025-01-17 RX ADMIN — METOPROLOL SUCCINATE 50 MG: 50 TABLET, EXTENDED RELEASE ORAL at 10:35

## 2025-01-17 RX ADMIN — TICAGRELOR 90 MG: 90 TABLET ORAL at 19:43

## 2025-01-17 RX ADMIN — Medication 600 MG: at 10:34

## 2025-01-17 RX ADMIN — FUROSEMIDE 40 MG: 20 TABLET ORAL at 10:35

## 2025-01-17 RX ADMIN — CYANOCOBALAMIN TAB 500 MCG 500 MCG: 500 TAB at 10:35

## 2025-01-17 RX ADMIN — FLUTICASONE PROPIONATE 1 SPRAY: 50 SPRAY, METERED NASAL at 10:34

## 2025-01-17 RX ADMIN — Medication 1000 MG: at 10:35

## 2025-01-17 RX ADMIN — ENOXAPARIN SODIUM 40 MG: 100 INJECTION SUBCUTANEOUS at 10:22

## 2025-01-17 RX ADMIN — AMLODIPINE BESYLATE 5 MG: 5 TABLET ORAL at 10:33

## 2025-01-17 RX ADMIN — ATORVASTATIN CALCIUM 80 MG: 40 TABLET, FILM COATED ORAL at 19:43

## 2025-01-17 ASSESSMENT — PAIN SCALES - GENERAL
PAINLEVEL_OUTOF10: 0
PAINLEVEL_OUTOF10: 0

## 2025-01-17 NOTE — PLAN OF CARE
Problem: Discharge Planning  Goal: Discharge to home or other facility with appropriate resources  Outcome: Progressing  Flowsheets (Taken 1/17/2025 0212)  Discharge to home or other facility with appropriate resources: Identify barriers to discharge with patient and caregiver     Problem: Safety - Adult  Goal: Free from fall injury  Outcome: Progressing  Flowsheets (Taken 1/17/2025 0212)  Free From Fall Injury: Instruct family/caregiver on patient safety     Problem: Cardiovascular - Adult  Goal: Maintains optimal cardiac output and hemodynamic stability  Outcome: Progressing  Flowsheets (Taken 1/17/2025 0212)  Maintains optimal cardiac output and hemodynamic stability:   Monitor blood pressure and heart rate   Monitor urine output and notify Licensed Independent Practitioner for values outside of normal range   Assess for signs of decreased cardiac output   Administer vasoactive medications as ordered   Administer fluid and/or volume expanders as ordered  Goal: Absence of cardiac dysrhythmias or at baseline  Outcome: Progressing  Flowsheets (Taken 1/17/2025 0212)  Absence of cardiac dysrhythmias or at baseline:   Monitor cardiac rate and rhythm   Assess for signs of decreased cardiac output   Administer antiarrhythmia medication and electrolyte replacement as ordered

## 2025-01-17 NOTE — PROGRESS NOTES
Transferred to [facility] 2 and prepped for heart cath. Dr Cox at bedside and questions answered.

## 2025-01-17 NOTE — CARE COORDINATION
Case Management Assessment  Initial Evaluation    Date/Time of Evaluation: 1/17/2025 12:26 PM  Assessment Completed by: Cherelle Swift RN    If patient is discharged prior to next notation, then this note serves as note for discharge by case management.    Patient Name: Annabel Mcknight                   YOB: 1955  Diagnosis: Coronary artery disease, unspecified vessel or lesion type, unspecified whether angina present, unspecified whether native or transplanted heart [I25.10]  Coronary artery disease due to lipid rich plaque [I25.10, I25.83]                   Date / Time: 1/16/2025  8:44 PM    Patient Admission Status: Inpatient   Readmission Risk (Low < 19, Mod (19-27), High > 27): Readmission Risk Score: 4.5    Current PCP: Stephania Ryan APRN - CNP  PCP verified by CM? (P) Yes    Chart Reviewed: Yes      History Provided by: (P) Patient  Patient Orientation: (P) Alert and Oriented    Patient Cognition: (P) Alert    Hospitalization in the last 30 days (Readmission):  No    If yes, Readmission Assessment in  Navigator will be completed.    Advance Directives:      Code Status: Full Code   Patient's Primary Decision Maker is: (P) Legal Next of Kin    Primary Decision Maker: Gary Mcknight - Spouse - [phone]    Discharge Planning:    Patient lives with: (P) Spouse/Significant Other Type of Home: (P) House  Primary Care Giver: (P) Self  Patient Support Systems include: (P) Spouse/Significant Other   Current Financial resources: (P) Medicare  Current community resources: (P) None  Current services prior to admission: (P) Durable Medical Equipment            Current DME: (P) Walker            Type of Home Care services:  (P) None    ADLS  Prior functional level: (P) Independent in ADLs/IADLs  Current functional level: (P) Independent in ADLs/IADLs    PT AM-PAC:   /24  OT AM-PAC:   /24    Family can provide assistance at DC: (P) Yes  Would you like Case Management to discuss the discharge plan

## 2025-01-17 NOTE — BRIEF OP NOTE
Brief Postoperative Note      Patient: Annabel Mcknight  YOB: 1955  MRN: [id number]    Date of Procedure: 1/17/2025    Pre-Op Diagnosis Codes:      * Coronary artery disease [I25.10]  Unstable angina     Post-Op Diagnosis: Same       Procedure(s):  Percutaneous coronary intervention    Surgeon(s):  Mima Cox MD Khawaja, Owais A, MD    Assistant:  * No surgical staff found *    Anesthesia: IV Sedation    Estimated Blood Loss (mL): less than 50     Complications: None    Implants:  Implant Name Type Inv. Item Serial No.  Lot No. LRB No. Used Action   STENT CORONARY RAJENDRA FRONTIER RX [id number] MM ZOTAROLIMUS ELUT - [id number] Coronary stents STENT CORONARY RAJENDRA FRONTIER RX 2X18 MM ZOTAROLIMUS ELUT  [employer]WD [id number] N/A 1 Implanted           Findings:    Severe complex in-stent restenosis of ostial diagonal and distal LAD   Successful PTCA of ostial diagonal with NC balloon followed by angioplasty with drug-coated balloon (agent)  Final kissing balloon angioplasty performed at ostial diagonal LAD  Successful PTCA/AMARILIS of distal LAD    Plan:     Change Plavix to Brilinta  Aspirin and high intensity statin  Cardiac rehab  In case of recurrent in-stent restenosis, patient will be best served with two-vessel CABG  Will observe overnight  Discussed with patient and Dr Jefferson in detail     Electronically signed by Mima Cox MD on 1/17/2025 at 8:27 AM

## 2025-01-17 NOTE — CONSULTS
Cardiology Consultation         Date:   1/17/2025  Patient name: Annabel Mcknight  Date of admission:  1/16/2025  8:44 PM  MRN:   [id number]  YOB: 1955    Reason for Admission: Unstable angina    Chief Complaint: Chest pain, recurrent    History of present illness:     69-year-old female with known history of two-vessel coronary artery disease status post bifurcation PCI to LAD and ostial D1 in April 2024, has previous stent in LAD in 2023, evaluated by Dr. Jefferson for recurrent chest pain, stress test in December was abnormal, underwent coronary angiography yesterday which showed severe 95% in-stent restenosis and ostial D1 along with 80% stenosis in the distal LAD, patient was transferred for consideration of intervention with drug coated balloon angioplasty.    Past Medical History:   has a past medical history of Allergic rhinitis, Aphthous ulcer of mouth, CAD (coronary artery disease), COVID-19, H/O echocardiogram, H/O echocardiogram, H/O echocardiogram, Hyperglycemia, Hyperlipidemia, Hypertension, Mitral regurgitation, MVP (mitral valve prolapse), Obesity, Pulmonary hypertension (HCC), S/P cardiac cath, Symptomatic menopausal or female climacteric states, Uterine fibroid, Venous insufficiency, and Wears glasses.    Past Surgical History:   has a past surgical history that includes hysteroscopy; Dilation and curettage of uterus (2008); Hysterectomy (2009); Colonoscopy (08/13/2018); Colonoscopy (N/A, 08/13/2018); Abdomen surgery (10/12/2018); Lithotripsy (Left); pr lithotripsy xtrcorp shock wave (Left, 12/04/2018); Coronary angioplasty with stent (03/07/2023); Cardiac catheterization (03/02/2023); Cardiac procedure (N/A, 04/18/2024); Cardiac catheterization (04/26/2024); Cardiac procedure (N/A, 04/26/2024); and Cardiac catheterization (Left, 01/16/2025).     Home Medications:    Prior to Admission medications    Medication Sig Start Date End Date Taking? Authorizing Provider   furosemide    Constitutional: there has been decline in functional capacity over last 3 months.     Eyes: No visual changes or diplopia.   ENT: No Headaches, hearing loss or vertigo. No mouth sores or sore throat.  Cardiovascular: as described in HPI   Respiratory: No hx of productive cough, pleuritic chest pain   Gastrointestinal: No abdominal pain, appetite loss, blood in stools. No change in bowel habits.  Genitourinary: No dysuria, trouble voiding, or hematuria.  Musculoskeletal:  No gait disturbance, No weakness or joint complaints.  Integumentary: No rash or pruritis.  Neurological: No headache, weakness, numbness or tingling. No change in gait, balance, coordination.  Psychiatric: No anxiety, or depression.  Endocrine: No temperature intolerance. No excessive thirst, fluid intake, or urination. No tremor.  Hematologic/Lymphatic: No abnormal bruising or bleeding, blood clots or swollen lymph nodes.  Allergic/Immunologic: No nasal congestion or hives.    PHYSICAL EXAM:    Physical Examination:    /61   Pulse 62   Temp 97.5 °F (36.4 °C) (Oral)   Resp 16   Ht 1.626 m (5' 4\")   Wt 89.4 kg (197 lb)   LMP  (LMP Unknown)   SpO2 96%   BMI 33.82 kg/m²      Constitutional and General Appearance: alert, cooperative, in no distress   HEENT: PERRL, no cervical lymphadenopathy. Normal oral mucosa  Respiratory:  Normal excursion and expansion without use of accessory muscles  Resp Auscultation: Good respiratory effort. No for increased work of breathing. On auscultation: clear to auscultation bilaterally, no wheezing, no rales.   Cardiovascular:  The apical impulse is not displaced  Heart tones are crisp and normal. regular S1 and S2.   Murmurs: None   Jugular venous pulsation Normal  Thre is no carotid bruit   Peripheral pulses are symmetrical and full   Abdomen:  No masses or tenderness  Bowel sounds present  Extremities:   No Cyanosis or Clubbing   Lower extremity edema: No edema    Skin: Warm and

## 2025-01-17 NOTE — H&P
[facility]  Office: [phone]  Ranjan Heller DO, David Lucio DO, Viral Hyde DO, Khalif Ruiz DO, Paula Haro MD, Cordelia Rojas MD, Emmanuelle Anderson MD, Sahara Mahajan MD,  Eliseo Ramirez MD, Rao Pete MD, Jasmin Whitmore MD,  Tim Pizarro DO, Malocm Rodriguez MD, Jun Saenz MD, Nir Heller DO, Janneth Lin MD,  Jong Borrero DO, Marla Llanos MD, Ivone Bell MD, Aparna Guidry MD, Noé Sutherland MD,  Vignesh Mcgovern MD, Geraldine Sr MD, Jimmy Carey MD, Isiah Quinonez MD, Corey Camargo MD, Mateo Lerma MD, Augustine Chand DO, Justin Beyer MD, Tim Alvarenga MD, Mohsin Reza, MD, Shirley Waterhouse, CNP,  Qian Martínez CNP, Augustine Mayers, CNP,  Krystyna Urbina, DNP, Sharon Magana, CNP, Marina Roper, CNP, Laquita Kasper, CNP, Shelia Watts, CNP, Hallie Lee, PA-C, Yudith De León PA-C, Minerva Raygoza, CNP, Tucker Israel, CNP,  Sylvie Perez, CNP, Day Castro, CNP, Madelyn Ramos, CNP,  Chelly Pang, CNS, Afua Garcia, CNP, Mahsa Johns, CNP,   Carla Kuhn, CNP         [facility]   IN-PATIENT SERVICE   [facility]    HISTORY AND PHYSICAL EXAMINATION            Date:   1/17/2025  Patient name:  Annabel Mcknight  Date of admission:  1/16/2025  8:44 PM  MRN:   [id number]  Account:  [id number]  YOB: 1955  PCP:    Stephania Ryan APRN - CNP  Room:   [address] [place] [address]  Code Status:    Full Code      History Obtained From:     patient, electronic medical record    History of Present Illness:     Annabel Mcknight is a 69 y.o. Non- / non  female who presents with unstable angina and is admitted to the hospital for the management of Coronary artery disease, unspecified vessel or lesion type, unspecified whether angina present, unspecified whether native or transplanted heart.    Is a very pleasant 69-year-old female presented to the hospital with unstable angina.  She originally presented to an outlying facility and

## 2025-01-17 NOTE — PLAN OF CARE
Problem: Discharge Planning  Goal: Discharge to home or other facility with appropriate resources  1/17/2025 1356 by Nedra Prasad RN  Outcome: Progressing  1/17/2025 0212 by Brenda Anne RN  Outcome: Progressing  Flowsheets (Taken 1/17/2025 0212)  Discharge to home or other facility with appropriate resources: Identify barriers to discharge with patient and caregiver     Problem: Safety - Adult  Goal: Free from fall injury  1/17/2025 1356 by Nedra Prasad RN  Outcome: Progressing  1/17/2025 0212 by Brenda Anne RN  Outcome: Progressing  Flowsheets (Taken 1/17/2025 0212)  Free From Fall Injury: Instruct family/caregiver on patient safety     Problem: Cardiovascular - Adult  Goal: Maintains optimal cardiac output and hemodynamic stability  1/17/2025 1356 by Nedra Prasad RN  Outcome: Progressing  1/17/2025 0212 by Brenda Anne RN  Outcome: Progressing  Flowsheets (Taken 1/17/2025 0212)  Maintains optimal cardiac output and hemodynamic stability:   Monitor blood pressure and heart rate   Monitor urine output and notify Licensed Independent Practitioner for values outside of normal range   Assess for signs of decreased cardiac output   Administer vasoactive medications as ordered   Administer fluid and/or volume expanders as ordered  Goal: Absence of cardiac dysrhythmias or at baseline  1/17/2025 1356 by Nedra Prasad RN  Outcome: Progressing  1/17/2025 0212 by Brenda Anne RN  Outcome: Progressing  Flowsheets (Taken 1/17/2025 0212)  Absence of cardiac dysrhythmias or at baseline:   Monitor cardiac rate and rhythm   Assess for signs of decreased cardiac output   Administer antiarrhythmia medication and electrolyte replacement as ordered

## 2025-01-17 NOTE — PLAN OF CARE
Problem: Discharge Planning  Goal: Discharge to home or other facility with appropriate resources  1/17/2025 1357 by Nedra Prasad RN  Outcome: Progressing  1/17/2025 1356 by Nedra Prasad RN  Outcome: Progressing  1/17/2025 0212 by Brenda Anne RN  Outcome: Progressing  Flowsheets (Taken 1/17/2025 0212)  Discharge to home or other facility with appropriate resources: Identify barriers to discharge with patient and caregiver     Problem: Safety - Adult  Goal: Free from fall injury  1/17/2025 1357 by Nedra Prasad RN  Outcome: Progressing  1/17/2025 1356 by Nedra Prasad RN  Outcome: Progressing  1/17/2025 0212 by Brenda Anne RN  Outcome: Progressing  Flowsheets (Taken 1/17/2025 0212)  Free From Fall Injury: Instruct family/caregiver on patient safety     Problem: Cardiovascular - Adult  Goal: Maintains optimal cardiac output and hemodynamic stability  1/17/2025 1357 by Nedra Prasad RN  Outcome: Progressing  1/17/2025 1356 by Nedra Prasad RN  Outcome: Progressing  1/17/2025 0212 by Bernda Anne RN  Outcome: Progressing  Flowsheets (Taken 1/17/2025 0212)  Maintains optimal cardiac output and hemodynamic stability:   Monitor blood pressure and heart rate   Monitor urine output and notify Licensed Independent Practitioner for values outside of normal range   Assess for signs of decreased cardiac output   Administer vasoactive medications as ordered   Administer fluid and/or volume expanders as ordered  Goal: Absence of cardiac dysrhythmias or at baseline  1/17/2025 1357 by Nedra Prasad RN  Outcome: Progressing  1/17/2025 1356 by Nedra Prasad RN  Outcome: Progressing  1/17/2025 0212 by Brenda Anne RN  Outcome: Progressing  Flowsheets (Taken 1/17/2025 0212)  Absence of cardiac dysrhythmias or at baseline:   Monitor cardiac rate and rhythm   Assess for signs of decreased cardiac output   Administer antiarrhythmia medication and electrolyte replacement as ordered

## 2025-01-18 VITALS
SYSTOLIC BLOOD PRESSURE: 139 MMHG | OXYGEN SATURATION: 96 % | DIASTOLIC BLOOD PRESSURE: 61 MMHG | BODY MASS INDEX: 33.61 KG/M2 | HEIGHT: 64 IN | TEMPERATURE: 98.2 F | WEIGHT: 196.87 LBS | RESPIRATION RATE: 16 BRPM | HEART RATE: 65 BPM

## 2025-01-18 PROCEDURE — 99232 SBSQ HOSP IP/OBS MODERATE 35: CPT

## 2025-01-18 PROCEDURE — 6370000000 HC RX 637 (ALT 250 FOR IP): Performed by: INTERNAL MEDICINE

## 2025-01-18 PROCEDURE — 99238 HOSP IP/OBS DSCHRG MGMT 30/<: CPT | Performed by: HOSPITALIST

## 2025-01-18 PROCEDURE — 6370000000 HC RX 637 (ALT 250 FOR IP): Performed by: HOSPITALIST

## 2025-01-18 RX ADMIN — TICAGRELOR 90 MG: 90 TABLET ORAL at 09:49

## 2025-01-18 RX ADMIN — ASPIRIN 81 MG: 81 TABLET, COATED ORAL at 09:50

## 2025-01-18 RX ADMIN — Medication 2000 UNITS: at 09:48

## 2025-01-18 RX ADMIN — AMLODIPINE BESYLATE 5 MG: 5 TABLET ORAL at 09:50

## 2025-01-18 RX ADMIN — CYANOCOBALAMIN TAB 500 MCG 500 MCG: 500 TAB at 09:49

## 2025-01-18 RX ADMIN — Medication 600 MG: at 09:48

## 2025-01-18 RX ADMIN — Medication 1000 MG: at 09:50

## 2025-01-18 RX ADMIN — FUROSEMIDE 40 MG: 20 TABLET ORAL at 09:49

## 2025-01-18 RX ADMIN — METOPROLOL SUCCINATE 50 MG: 50 TABLET, EXTENDED RELEASE ORAL at 09:49

## 2025-01-18 RX ADMIN — PANTOPRAZOLE SODIUM 20 MG: 20 TABLET, DELAYED RELEASE ORAL at 05:37

## 2025-01-18 NOTE — PLAN OF CARE
Problem: Discharge Planning  Goal: Discharge to home or other facility with appropriate resources  1/18/2025 0959 by Nedra Prasad RN  Outcome: Adequate for Discharge  1/18/2025 0055 by Stephania Blanca RN  Outcome: Progressing     Problem: Safety - Adult  Goal: Free from fall injury  1/18/2025 0959 by Nedra Prasad RN  Outcome: Adequate for Discharge  1/18/2025 0055 by Stephania Blanca RN  Outcome: Progressing     Problem: Cardiovascular - Adult  Goal: Maintains optimal cardiac output and hemodynamic stability  1/18/2025 0959 by Nedra Prasad RN  Outcome: Adequate for Discharge  1/18/2025 0055 by Stephania Blanca RN  Outcome: Progressing  Goal: Absence of cardiac dysrhythmias or at baseline  1/18/2025 0959 by Nedra Prasad RN  Outcome: Adequate for Discharge  1/18/2025 0055 by Stephania Blanca RN  Outcome: Progressing

## 2025-01-18 NOTE — DISCHARGE SUMMARY
[facility]  Office: [phone]  Ranjan Heller DO, David Lucio DO, Viral Hyde DO, Khalif Ruiz DO, Paula Haro MD, Cordelia Rojas MD, Emmanuelle Anderson MD, Sahara Mahajan MD,  Eliseo Ramirez MD, Rao Pete MD, Jasmin Whitmore MD,  Tim Pizarro DO, Malcom Rodriguez MD, Jun Saenz MD, Nir Heller DO, Janneth Lin MD,  Jong Borrero DO, Marla Llanos MD, Ivone Bell MD, Aparna Guidry MD, Noé Sutherland MD,  Vignesh Mcgovern MD, Geraldine Sr MD, Jimmy Carey MD, Isiah Quinonez MD, Corey Camargo MD, Mateo Lerma MD, Augustine Chand DO, Justin Beyer MD, Tim Alvarenga MD, Mohsin Reza, MD, Shirley Waterhouse, CNP,  Qian Martínez CNP, Augustine Mayers, CNP,  Krystyna Urbina, [facility], Sharon Magana, CNP, Marina Roper, CNP, Laquita Kasper, CNP, Shelia Watts, CNP, Hallie Lee, PA-C, Yudith De León PA-C, Minerva Raygoza, CNP, Tucker Israel, CNP,  Sylvie Perez, CNP, Day Castro, CNP, Madelyn Ramos, CNP,  Chelly Pang, CNS, Afua Garcia, CNP, Mahsa Johns, CNP,   Carla Kuhn, CNP         [facility]   IN-PATIENT SERVICE   [facility]    Discharge Summary     Patient ID: Annabel Mcknight  :  1955   MRN: [id number]     ACCOUNT:  [id number]   Patient's PCP: Stephania Ryan APRN - CNP  Admit Date: 2025   Discharge Date: 2025  Length of Stay: 2  Code Status:  Full Code  Admitting Physician: Nir Heller DO  Discharge Physician: Nir T Retholtz, DO     Active Discharge Diagnoses:     Hospital Problem Lists:  Principal Problem:    Coronary artery disease, unspecified vessel or lesion type, unspecified whether angina present, unspecified whether native or transplanted heart  Active Problems:    Coronary artery disease due to lipid rich plaque  Resolved Problems:    * No resolved hospital problems. *      Admission Condition:  fair     Discharged Condition: good    Hospital Stay:     Hospital Course:  Annabel Mcknight is a 69

## 2025-01-18 NOTE — PLAN OF CARE
Problem: Discharge Planning  Goal: Discharge to home or other facility with appropriate resources  1/18/2025 0055 by Stephania Blanca RN  Outcome: Progressing     Problem: Safety - Adult  Goal: Free from fall injury  1/18/2025 0055 by Stephania Blanca RN  Outcome: Progressing     Problem: Cardiovascular - Adult  Goal: Maintains optimal cardiac output and hemodynamic stability  1/18/2025 0055 by Stephania Blanca RN  Outcome: Progressing     Problem: Cardiovascular - Adult  Goal: Absence of cardiac dysrhythmias or at baseline  1/18/2025 0055 by Stephania Blanca RN  Outcome: Progressing

## 2025-01-18 NOTE — PROGRESS NOTES
Cardiology Progress Note                     Date:   1/18/2025  Patient name: Annabel Mcknight  Date of admission:  1/16/2025  8:44 PM  MRN:   [id number]  YOB: 1955  PCP: Stephania Ryan, APRN - CNP    Reason for Admission:      Subjective:       Pt sitting in bed, no complaints, family at bedside. There were no acute events overnight, remained hemodynamically stable, denies chest pain, dyspnea, orthopnea or palpitations. SR on telemetry, BP stable, on RA      Scheduled Meds:   aspirin  81 mg Oral Daily    ticagrelor  90 mg Oral BID    amLODIPine  5 mg Oral Daily    vitamin C  1,000 mg Oral Daily    atorvastatin  80 mg Oral Nightly    calcium carbonate  600 mg Oral Daily    Vitamin D  2,000 Units Oral Daily    fluticasone  1 spray Nasal Daily    furosemide  40 mg Oral Daily    metoprolol succinate  50 mg Oral Daily    pantoprazole  20 mg Oral QAM AC    vitamin B-12  500 mcg Oral Daily    sodium chloride flush  5-40 mL IntraVENous 2 times per day    enoxaparin  40 mg SubCUTAneous Daily       Continuous Infusions:   sodium chloride Stopped (01/18/25 0953)       Labs:     CBC:   Recent Labs     01/17/25  0636   WBC 5.5   HGB 13.6        BMP:    Recent Labs     01/17/25  0636      K 3.8   *   CO2 28   BUN 10   CREATININE 0.6   GLUCOSE 94     Hepatic: No results for input(s): \"AST\", \"ALT\", \"BILITOT\", \"ALKPHOS\" in the last 72 hours.    Invalid input(s): \"ALB\"  Troponin: No results for input(s): \"TROPONINI\" in the last 72 hours.  BNP: No results for input(s): \"BNP\" in the last 72 hours.  Lipids: No results for input(s): \"CHOL\", \"HDL\" in the last 72 hours.    Invalid input(s): \"LDLCALCU\"  INR: No results for input(s): \"INR\" in the last 72 hours.      Objective:     Vitals: /61   Pulse 65   Temp 98.2 °F (36.8 °C) (Oral)   Resp 16   Ht 1.626 m (5' 4\")   Wt 89.3 kg (196 lb 13.9 oz)   LMP  (LMP Unknown)   SpO2 96%   BMI 33.79 kg/m²     General appearance: awake,

## 2025-01-18 NOTE — PROGRESS NOTES
[facility]  Office: [phone]  Ranjan Heller DO, David Lucio DO, Viral Hyde DO, Khalif Ruiz DO, Paula Haro MD, Cordelia Rojas MD, Emmanuelle Anderson MD, Sahara Mahajan MD,  Eliseo Ramirez MD, Rao Pete MD, Jasmin Whitmore MD,  Tim Pizarro DO, Malcom Rodriguez MD, Jun Saenz MD, Nir Heller DO, Janneth Lin MD,  Jong Borrero DO, Marla Llanos MD, Ivone Bell MD, Aparna Guidry MD, Noé Sutherland MD,  Vignesh Mcgovern MD, Geraldine Sr MD, Jimmy Carey MD, Isiah Quinonez MD, Corey Camargo MD, Mateo Lerma MD, Augustine Chand DO, Justin Beyer MD, Tim Alvarenga MD, Mohsin Reza, MD, Shirley Waterhouse, CNP,  Qian Martínez CNP, Augustine Mayers, CNP,  Krystyna Urbina, [facility], Sharon Magana, CNP, Marina Roper, CNP, Laquita Kasper, CNP, Shelia Watts, CNP, Hallie Lee, PA-C, Yudith De León PA-C, Minerva Raygoza, CNP, Tucker Israel, CNP,  Sylvie Perez, CNP, Day Castro, CNP, Madelyn Ramos, CNP,  Chelyl Pang, CNS, Afua Garcia, CNP, Mahsa Johns, CNP,   Carla Kuhn, CNP         [facility]   IN-PATIENT SERVICE   [facility]    Progress Note    1/18/2025    10:16 AM    Name:   Annabel Mcknight  MRN:     [id number]     Acct:      [id number]   Room:   [facility] Day:  2  Admit Date:  1/16/2025  8:44 PM    PCP:   Stephania Ryan, AIDEE - CNP  Code Status:  Full Code    Subjective:     Patient seen in follow-up for unstable angina secondary to in-stent stenosis, patient states \"I feel much better\"    Patient did have some mild dyspnea which may be related to Brilinta.  We did review adverse side effects of Brilinta and although dyspnea is a side effect it typically resolves after 1 week.  The patient states that her symptoms are mild and she will follow-up with cardiology for recommendations should to be persistent.  Her chest pain has resolved completely and at this point in time she can be discharged home with outpatient follow-up.

## 2025-01-19 LAB — ECHO BSA: 2.01 M2

## 2025-01-20 ENCOUNTER — CARE COORDINATION (OUTPATIENT)
Dept: CARE COORDINATION | Age: 70
End: 2025-01-20

## 2025-01-20 DIAGNOSIS — I25.83 CORONARY ARTERY DISEASE DUE TO LIPID RICH PLAQUE: Primary | ICD-10-CM

## 2025-01-20 DIAGNOSIS — I25.10 CORONARY ARTERY DISEASE DUE TO LIPID RICH PLAQUE: Primary | ICD-10-CM

## 2025-01-20 PROCEDURE — 1111F DSCHRG MED/CURRENT MED MERGE: CPT | Performed by: NURSE PRACTITIONER

## 2025-01-20 NOTE — TELEPHONE ENCOUNTER
Called and spoke with patient, patient has an appointment scheduled with Dr Jefferson 2/17/25. She does not feel she needs to see Stephania at this time.

## 2025-01-20 NOTE — CARE COORDINATION
Care Transitions Note    Initial Call - Call within 2 business days of discharge: Yes    Patient Current Location:  Home: [address] [place] [address]    Care Transition Nurse contacted the patient by telephone to perform post hospital discharge assessment, verified name and  as identifiers. Provided introduction to self, and explanation of the Care Transition Nurse role.     Patient: Annabel Mcknight    Patient : 1955   MRN: [id number]    Reason for Admission: CAD  cath with stents  Discharge Date: 25  RURS: Readmission Risk Score: 4.6      Last Discharge Facility       Date Complaint Diagnosis Description Type Department Provider    25  Coronary artery disease, unspecified vessel or lesion type, unspecified whether angina present, unspecified whether native or transplanted heart ... Admission (Discharged) [facility] Nir Heller,     25  Abnormal stress test Admission (Discharged) [facility] kD Jefferson MD            Was this an external facility discharge? No    Additional needs identified to be addressed with provider   High priority: HFU with PCP             Method of communication with provider: chart routing.    Patients top risk factors for readmission: medical condition-CAD    Interventions to address risk factors:   Review of patient management of conditions/medications: discharge  Referrals: to Gabrielle Steven for help with Brilinta cost if possible     Care Summary Note: Was able to contact Annabel for initial transitional outreach.  She went to the [facility] to have a cardiac cath for complaints of shortness of breath, chest pain, palpitations and dizziness.   She was found to have a blockage and was transferred to [facility] and had stents placed.  She was noted to have re occluded her previous stents.  She was started to Brilinta in attempt to stop reocclusion. She said that she had some shortness of breath over the weekend, but it was better.  She is

## 2025-01-20 NOTE — CARE COORDINATION
Spoke with the patient and we are going to attempt to get her enrolled into the PAP program for her Brilinta. I will be mailing her portion to her soon.      Gabrielle Steven [facility]  CC   Medication Assistance  [place] and [employer]    (P) [phone]  (F) [phone]

## 2025-01-22 ENCOUNTER — OFFICE VISIT (OUTPATIENT)
Dept: PRIMARY CARE CLINIC | Age: 70
End: 2025-01-22

## 2025-01-22 VITALS
DIASTOLIC BLOOD PRESSURE: 72 MMHG | TEMPERATURE: 96.2 F | BODY MASS INDEX: 33.47 KG/M2 | OXYGEN SATURATION: 96 % | SYSTOLIC BLOOD PRESSURE: 130 MMHG | HEART RATE: 84 BPM | WEIGHT: 195 LBS

## 2025-01-22 DIAGNOSIS — I25.10 CORONARY ARTERY DISEASE INVOLVING NATIVE CORONARY ARTERY OF NATIVE HEART WITHOUT ANGINA PECTORIS: Primary | ICD-10-CM

## 2025-01-22 ASSESSMENT — PATIENT HEALTH QUESTIONNAIRE - PHQ9
SUM OF ALL RESPONSES TO PHQ QUESTIONS 1-9: 0
SUM OF ALL RESPONSES TO PHQ9 QUESTIONS 1 & 2: 0
1. LITTLE INTEREST OR PLEASURE IN DOING THINGS: NOT AT ALL
SUM OF ALL RESPONSES TO PHQ QUESTIONS 1-9: 0
2. FEELING DOWN, DEPRESSED OR HOPELESS: NOT AT ALL

## 2025-01-22 ASSESSMENT — ENCOUNTER SYMPTOMS: SHORTNESS OF BREATH: 1

## 2025-01-22 NOTE — PROGRESS NOTES
Name: Annabel Mcknight  : 1955         Chief Complaint:     Chief Complaint   Patient presents with    Follow-Up from Hospital     -stent placement LAD artery  -angioplasty and repair to the D1 artery  -having palpitations/fluttering  -denies SOB  -denies pain       History of Present Illness:      Annabel Mcknight is a 69 y.o.  female who presents with Follow-Up from Hospital (-stent placement LAD artery/-angioplasty and repair to the D1 artery/-having palpitations/fluttering/-denies SOB/-denies pain)      HPI    Patient presents for hospital follow-up.  She was admitted to [facility] 2025 - 2025 and underwent left heart catheterization 2025. Per [facility] hospital course as follows:    \"This very pleasant 69-year-old female with known coronary artery disease presented to an outlying facility secondary to unstable angina. The patient underwent diagnostic cardiac catheterization which revealed in-stent stenosis. The patient was brought to our facility where she underwent drug-coated balloon angioplasty. Her Plavix was discontinued and she is transition to Brilinta. Her symptoms resolved and she is discharged home with instructions to follow-up with cardiology. She did have some mild dyspnea which may be related to her Brilinta. Typically the symptoms go away after 1 week of therapy. She is to follow-up with cardiology and discuss should her symptoms persist. Patient is discharged in stable condition.\"    Today 25, she states she feels tired and is feeling some palpitations. She did have some SOB while walking in the cold weather, but this has since resolved. Otherwise, her SOB has been \"better\" but is still there occasionally, causing her the need to take a deep breath. Denies chest pain. She is taking Brilinta BID. She is working with interventional cardiology nurse to assist with affordability of this medication. She is planning to begin cardiac rehab this week. She is scheduled

## 2025-01-23 ENCOUNTER — CARE COORDINATION (OUTPATIENT)
Dept: CARE COORDINATION | Age: 70
End: 2025-01-23

## 2025-01-23 NOTE — CARE COORDINATION
I was able to mail the patient her portion of the application for medication assistance.          Gabrielle Steven [facility]  CC   Medication Assistance  [place] and [employer]    (P) [phone]  (F) [phone]

## 2025-01-24 ENCOUNTER — HOSPITAL ENCOUNTER (OUTPATIENT)
Dept: CARDIAC REHAB | Age: 70
Setting detail: THERAPIES SERIES
Discharge: HOME | End: 2025-01-24

## 2025-01-24 NOTE — PROGRESS NOTES
Phase II Cardiac Rehabilitation   Physician Order Form    Annabel Plummerlloyd  1955  [id number]  1/24/2025    Procedure/Code:  Phase 2 Cardiac Rehabilitation/66792  Diagnosis/Code:  PTCA/Z95.5  Onset/Procedure Date:  1/17/2025    Prescribed Exercise Plan:  Submaximal exercise evaluation at program beginning and completion  Target HR: 60-80% HRR    Initial MET Level: 3-6 METs     Duration: 31 - 90 Minutes  Frequency: 3 Days per week  Modalities:  Treadmill   Airdyne  Bicycle ergometer/UBE  Seated Stepper  Rowing Machine  Weights/Wt Machine/Bands/Neuromotor  Progress exercise per FITT protocol:    Achieve and progress prescribed exercise frequency, intensity, type, and time based upon initial evaluation/submaximal graded exercise/6MWT results, at least 3 X per week, maintaining the prescribed target heart rate range, a Juan Diego rating of perceived exertion between 11 and 16, duration of 31 - 90 minutes using multiple exercise modes, (including, but not limited to, rower, bicycle, arm ergometer, recumbent stepper, treadmill) on at least 3 days/week, progressing at least 0.5-1.0 METs/week and/or 5-10 minutes/week as tolerated, while adhering to guidelines and patient centered goals.   Introduce 8-15 bilateral upper /lower extremity resistance exercise at 1-3 sets per lift, on 2-3 non-consecutive days using Band/weights to 8-15 reps on at lease 2 occasions, maintaining RPE 12-16.  Once repetition maximum has been achieved, additional weight sets may be added.      Standing Orders:  Initiate ACLS for cardiac events  Nitroglycerine 0.4mg SL every 5 minutes X 3 for angina pain  12 lead EKG for chest pain or dysrhythmia  O2 per nasal cannula as needed for SpO2 <90%   Random blood glucose per lab for hyper/hypoglycemia symptoms  Lipid panel pre/post program as needed.  May continue in Phase III/maintenance program at program completion

## 2025-01-24 NOTE — PROGRESS NOTES
Cardiac Rehab Initial History and Assessment    Annabel Mcknight   1955  [id number]  1/24/2025    Primary Diagnosis: PTCA w/AMARILIS  Date of event/procedure: 1/17/25    Participated in cardiac rehab program before:  [x] Yes     [] No    Living Will: [] Yes   [x] No  On File: [] Yes   [] No   [x] N/A  Durable Power of : [] Yes   [x] No    Medical History  Past Medical History:   Diagnosis Date    Allergic rhinitis 1992    Aphthous ulcer of mouth     CAD (coronary artery disease)     COVID-19 09/09/2022    H/O echocardiogram 08/11/2015    EF 65%. Aortic valve sclerosis without stenosis. Mild aortic regurgitation.  Myxomatous thickening of the mitral leaflets with Moderate mitral regurgitation. Mils (grade l) diastolic dysfunction.    H/O echocardiogram 09/02/2016    EF >60%.  Normal LV wall thickness and cavity size. No definite specific wall motion abnormalities were identified. Left atrium is mildly dilated (29-33) left atrial volume index of 31 ml/m2. Mild aortic stenosis. Myxomatous thickening of the mitral leaflets with moderate eccentric mitral regurg. Mild treicuspid regurg.  Mild diastolic dysfunction is seen.    H/O echocardiogram 08/06/2018    EF >55%. The LV wall thickness is mildly incrased. Moderate mitral regurg. Mild aortic stenosis. Mild aortic regurg. Mild tricuspid regurg. Evidence of mild (grade I) diastolic dysfucntion is seen.     Hyperglycemia 2013    Hyperlipidemia     Hypertension     Mitral regurgitation 2010    MVP (mitral valve prolapse) 2010    Obesity 2010    Pulmonary hypertension (HCC) 2013    S/P cardiac cath 03/02/2023    [facility]/Dr. Jefferson/Right Radial    Symptomatic menopausal or female climacteric states 07/21/2015    Uterine fibroid 2008    Venous insufficiency 2013    Wears glasses        Family History  Family History   Problem Relation Age of Onset    High Blood Pressure Father     Heart Disease Father     Stroke Father     Dementia Mother     Other Mother

## 2025-01-28 ENCOUNTER — CARE COORDINATION (OUTPATIENT)
Dept: CARE COORDINATION | Age: 70
End: 2025-01-28

## 2025-01-28 NOTE — CARE COORDINATION
Care Transitions Note    Follow Up Call     Patient Current Location:  Home: [address] [place] [id number]    Care Transition Nurse contacted the patient by telephone. Verified name and  as identifiers.    Additional needs identified to be addressed with provider   No needs identified                 Method of communication with provider: none.    Care Summary Note: Was able to contact Annabel for transitional outreach.  She stated that she was \"not doing so bad\".  She denied any chest pain/palpitations, cough, swelling, or dizziness/lightheadedness. She denied any problems with the cath site and said that the bruising is fading.  She said that she still is experiencing the shortness of breath with moderate activity.   She still feels that the shortness of breath is caused by the Brilinta.  She said that she went to her PCP appointment and the cardiac rehab assessment appointment.    She will be seeing cardio on .  She had no questions/concerns.     Plan of care updates since last contact:  PCP appointment       Advance Care Planning:   Does patient have an Advance Directive: reviewed during previous call, see note. .    Medication Review:  No changes since last call.     Remote Patient Monitoring:  Offered patient enrollment in the Remote Patient Monitoring (RPM) program for in-home monitoring: Patient is not eligible for RPM program because: patient does not have qualifying diagnosis.    Assessments:  Care Transitions Subsequent and Final Call    Subsequent and Final Calls  Do you have any ongoing symptoms?: Yes  Onset of Patient-reported symptoms: In the past 7 days  Patient-reported symptoms: Shortness of Breath  Have your medications changed?: No  Do you have any questions related to your medications?: No  Do you currently have any active services?: Yes  Are you currently active with any services?: Outpatient/Community Services  Do you have any needs or concerns that I can assist you with?: No  Care

## 2025-02-03 ENCOUNTER — HOSPITAL ENCOUNTER (OUTPATIENT)
Dept: CARDIAC REHAB | Age: 70
Setting detail: THERAPIES SERIES
Discharge: HOME | End: 2025-02-03

## 2025-02-03 PROCEDURE — 93798 PHYS/QHP OP CAR RHAB W/ECG: CPT

## 2025-02-04 ENCOUNTER — CARE COORDINATION (OUTPATIENT)
Dept: CARE COORDINATION | Age: 70
End: 2025-02-04

## 2025-02-04 NOTE — CARE COORDINATION
Care Transitions Note    Follow Up Call     Attempted to reach patient for transitions of care follow up.  Unable to reach patient.      Outreach Attempts:   HIPAA compliant voicemail left for patient.     Care Summary Note: 1st attempt    Follow Up Appointment:   Future Appointments         Provider Specialty Dept Phone    2/5/2025 8:00 AM [facility] 1 Cardiac Rehabilitation [phone]    2/6/2025 8:00 AM [facility] 1 Cardiac Rehabilitation [phone]    2/10/2025 8:00 AM [facility] 1 Cardiac Rehabilitation [phone]    2/12/2025 8:00 AM [facility] 1 Cardiac Rehabilitation [phone]    2/13/2025 8:00 AM [facility] 1 Cardiac Rehabilitation [phone]    2/17/2025 8:00 AM [facility] 1 Cardiac Rehabilitation [phone]    2/17/2025 1:00 PM Dk Jefferson MD Cardiology [phone]    2/19/2025 8:00 AM [facility] 1 Cardiac Rehabilitation [phone]    2/20/2025 8:00 AM [facility] 1 Cardiac Rehabilitation [phone]    2/24/2025 8:00 AM [facility] 1 Cardiac Rehabilitation [phone]    2/26/2025 8:00 AM [facility] 1 Cardiac Rehabilitation [phone]    2/27/2025 8:00 AM [facility] 1 Cardiac Rehabilitation [phone]    3/3/2025 8:00 AM [facility] 1 Cardiac Rehabilitation [phone]    3/5/2025 8:00 AM [facility] 1 Cardiac Rehabilitation [phone]    3/6/2025 8:00 AM [facility] 1 Cardiac Rehabilitation [phone]    3/10/2025 8:00 AM [facility] 1 Cardiac Rehabilitation [phone]    3/12/2025 8:00 AM [facility] 1 Cardiac Rehabilitation [phone]    3/13/2025 8:00 AM [facility] CARDIOPULM REHAB RM 1 Cardiac Rehabilitation [phone]    3/17/2025 8:00 AM [facility] CARDIOPULM REHAB RM 1 Cardiac Rehabilitation [phone]    3/19/2025 8:00 AM [facility] CARDIOPULM REHAB RM 1 Cardiac Rehabilitation

## 2025-02-05 ENCOUNTER — CARE COORDINATION (OUTPATIENT)
Dept: CARE COORDINATION | Age: 70
End: 2025-02-05

## 2025-02-05 ENCOUNTER — HOSPITAL ENCOUNTER (OUTPATIENT)
Dept: CARDIAC REHAB | Age: 70
Setting detail: THERAPIES SERIES
Discharge: HOME | End: 2025-02-05

## 2025-02-05 PROCEDURE — 93798 PHYS/QHP OP CAR RHAB W/ECG: CPT

## 2025-02-05 NOTE — CARE COORDINATION
1 Cardiac Rehabilitation [phone]    5/22/2025 8:00 AM [facility] CARDIOPULM REHAB  1 Cardiac Rehabilitation [phone]    8/5/2025 8:40 AM Stephania Ryan APRN - CNP Primary Care [phone]    10/7/2025 9:00 AM Neil Yoder PA-C Urology [phone]            Care Transition Nurse provided contact information.  Plan for follow-up call in 6-10 days based on severity of symptoms and risk factors.  Plan for next call: symptom management-follow up on chest pain or any complications from cardiac cath with stents.       MAN BASSETT RN

## 2025-02-06 ENCOUNTER — HOSPITAL ENCOUNTER (OUTPATIENT)
Dept: CARDIAC REHAB | Age: 70
Setting detail: THERAPIES SERIES
Discharge: HOME | End: 2025-02-06

## 2025-02-06 PROCEDURE — 93798 PHYS/QHP OP CAR RHAB W/ECG: CPT

## 2025-02-10 ENCOUNTER — CARE COORDINATION (OUTPATIENT)
Dept: CARE COORDINATION | Age: 70
End: 2025-02-10

## 2025-02-10 ENCOUNTER — HOSPITAL ENCOUNTER (OUTPATIENT)
Dept: CARDIAC REHAB | Age: 70
Setting detail: THERAPIES SERIES
Discharge: HOME | End: 2025-02-10
Payer: MEDICARE

## 2025-02-10 PROCEDURE — 93798 PHYS/QHP OP CAR RHAB W/ECG: CPT

## 2025-02-10 NOTE — CARE COORDINATION
Patient called with a couple questions about the paperwork I had sent her in regards to her PAP application. She is going to get it dropped off to be sent to me soon.        Gabrielle Steven [facility]  CC   Medication Assistance  [employer] [facility] [place] [employer]    (P) [phone]  (F) [phone]

## 2025-02-11 ENCOUNTER — CARE COORDINATION (OUTPATIENT)
Dept: CARE COORDINATION | Age: 70
End: 2025-02-11

## 2025-02-11 DIAGNOSIS — R73.9 HYPERGLYCEMIA: ICD-10-CM

## 2025-02-11 DIAGNOSIS — I10 ESSENTIAL HYPERTENSION: ICD-10-CM

## 2025-02-11 DIAGNOSIS — K21.9 GASTROESOPHAGEAL REFLUX DISEASE, UNSPECIFIED WHETHER ESOPHAGITIS PRESENT: ICD-10-CM

## 2025-02-11 DIAGNOSIS — E78.5 HYPERLIPIDEMIA, UNSPECIFIED HYPERLIPIDEMIA TYPE: ICD-10-CM

## 2025-02-11 RX ORDER — PANTOPRAZOLE SODIUM 40 MG/1
TABLET, DELAYED RELEASE ORAL
Qty: 90 TABLET | Refills: 1 | OUTPATIENT
Start: 2025-02-11

## 2025-02-11 NOTE — CARE COORDINATION
Rehabilitation [phone]    5/8/2025 8:00 AM [facility] 1 Cardiac Rehabilitation [phone]    5/12/2025 8:00 AM [facility] 1 Cardiac Rehabilitation [phone]    5/14/2025 8:00 AM [facility] 1 Cardiac Rehabilitation [phone]    5/15/2025 8:00 AM [facility] 1 Cardiac Rehabilitation [phone]    5/19/2025 8:00 AM [facility] 1 Cardiac Rehabilitation [phone]    5/21/2025 8:00 AM [facility] 1 Cardiac Rehabilitation [phone]    5/22/2025 8:00 AM [facility] 1 Cardiac Rehabilitation [phone]    8/5/2025 8:40 AM Stephania Ryan, APRN - CNP Primary Care [phone]    10/7/2025 9:00 AM Neil Yoder, PAEricC Urology [phone]            Care Transition Nurse provided contact information.  Plan for follow-up call in 6-10 days based on severity of symptoms and risk factors.  Plan for next call: symptom management-follow up on any problems from cardiac cath. FInal call      MAN BASSETT RN

## 2025-02-12 ENCOUNTER — HOSPITAL ENCOUNTER (OUTPATIENT)
Dept: CARDIAC REHAB | Age: 70
Setting detail: THERAPIES SERIES
Discharge: HOME | End: 2025-02-12
Payer: MEDICARE

## 2025-02-12 PROCEDURE — 93798 PHYS/QHP OP CAR RHAB W/ECG: CPT

## 2025-02-13 ENCOUNTER — APPOINTMENT (OUTPATIENT)
Dept: CARDIAC REHAB | Age: 70
End: 2025-02-13
Payer: MEDICARE

## 2025-02-17 ENCOUNTER — OFFICE VISIT (OUTPATIENT)
Dept: CARDIOLOGY | Age: 70
End: 2025-02-17
Payer: MEDICARE

## 2025-02-17 ENCOUNTER — HOSPITAL ENCOUNTER (OUTPATIENT)
Dept: CARDIAC REHAB | Age: 70
Setting detail: THERAPIES SERIES
Discharge: HOME | End: 2025-02-17
Payer: MEDICARE

## 2025-02-17 VITALS
WEIGHT: 195.2 LBS | OXYGEN SATURATION: 98 % | DIASTOLIC BLOOD PRESSURE: 71 MMHG | BODY MASS INDEX: 33.32 KG/M2 | HEIGHT: 64 IN | SYSTOLIC BLOOD PRESSURE: 126 MMHG | RESPIRATION RATE: 16 BRPM | HEART RATE: 66 BPM

## 2025-02-17 DIAGNOSIS — I50.32 CHRONIC DIASTOLIC HF (HEART FAILURE), NYHA CLASS 2 (HCC): ICD-10-CM

## 2025-02-17 DIAGNOSIS — E78.2 MIXED HYPERLIPIDEMIA: ICD-10-CM

## 2025-02-17 DIAGNOSIS — I20.9 ANGINA, CLASS II: ICD-10-CM

## 2025-02-17 DIAGNOSIS — Z95.820 S/P ANGIOPLASTY WITH STENT: ICD-10-CM

## 2025-02-17 DIAGNOSIS — I25.10 CORONARY ARTERY DISEASE DUE TO LIPID RICH PLAQUE: Primary | ICD-10-CM

## 2025-02-17 DIAGNOSIS — I25.83 CORONARY ARTERY DISEASE DUE TO LIPID RICH PLAQUE: Primary | ICD-10-CM

## 2025-02-17 DIAGNOSIS — I11.0 HYPERTENSIVE HEART DISEASE WITH HEART FAILURE (HCC): ICD-10-CM

## 2025-02-17 PROBLEM — R94.39 ABNORMAL CARDIOVASCULAR STRESS TEST: Status: RESOLVED | Noted: 2023-03-02 | Resolved: 2025-02-17

## 2025-02-17 PROBLEM — R94.39 ABNORMAL STRESS TEST: Status: RESOLVED | Noted: 2025-01-06 | Resolved: 2025-02-17

## 2025-02-17 PROCEDURE — 99214 OFFICE O/P EST MOD 30 MIN: CPT | Performed by: FAMILY MEDICINE

## 2025-02-17 PROCEDURE — 3078F DIAST BP <80 MM HG: CPT | Performed by: FAMILY MEDICINE

## 2025-02-17 PROCEDURE — 1159F MED LIST DOCD IN RCRD: CPT | Performed by: FAMILY MEDICINE

## 2025-02-17 PROCEDURE — 93798 PHYS/QHP OP CAR RHAB W/ECG: CPT

## 2025-02-17 PROCEDURE — 3074F SYST BP LT 130 MM HG: CPT | Performed by: FAMILY MEDICINE

## 2025-02-17 PROCEDURE — 93000 ELECTROCARDIOGRAM COMPLETE: CPT | Performed by: FAMILY MEDICINE

## 2025-02-17 PROCEDURE — 1123F ACP DISCUSS/DSCN MKR DOCD: CPT | Performed by: FAMILY MEDICINE

## 2025-02-17 NOTE — PROGRESS NOTES
I, Renettageno Dexterbreana am scribing for and in the presence of Dk Jefferson MD, MS, F.A.C.C..      Subjective:     CHIEF COMPLAINT / HPI:    Chief Complaint   Patient presents with    Shortness of Breath     Hx: SOB, CP. 6 week follow up. Cath/stenting with Ronny/Kenny.     She has been feeling okay. When she first came home she was tired & SOB. Is feeling better. BP running normal. CP still at times, happening every 3-4 days - pressure with exertion.     Did start cardiac rehab.     Denies: Lightheaded/dizziness, Palpitations      Dear Stephania Ryan, APRN - CNP,     I had the pleasure of seeing Annabel Mcknight in consultation today.    Annabel Mcknight is 69 y.o. female with past medical history of hyperlipidemia, hypertension and mitral regurgitation. No history of coronary artery disease, myocardial infarction, heart failure or significant arrhythmia. Her father had heart disease starting at 55 years old. She is former smoker, 1 ppk a week. Negative stress Echo in 2015. Her risk factors for coronary artery disease includes hypertension, hyperlipidemia and family history of premature coronary artery disease. ECG done in office 9/17/2019- Normal sinus rhythm, normal ECG. Echo done on 8/6/2018- EF >55%. The LV wall thickness is mildly incrased. Moderate mitral regurg. Mild aortic stenosis. Mild aortic regurg. Mild tricuspid regurg. Evidence of mild (grade I) diastolic dysfucntion is seen.     Echo done on 2/22/23: Global left ventricular systolic function appears preserved with an estimated ejection fraction of 60%. The left ventricular cavity size is within normal limits and the left ventricular wall thickness is mildly increased. Aortic leaflet calcification with mild aortic stenosis with a mean gradient of 14 mmHg. Mild aortic regurgitation was seen. Mild to moderate posteriorly directed mitral regurgitation was seen. Mild pulmonary hypertension with an estimated right ventricular systolic pressure of 37 mmHg. Mild

## 2025-02-18 ENCOUNTER — CARE COORDINATION (OUTPATIENT)
Dept: CARE COORDINATION | Age: 70
End: 2025-02-18

## 2025-02-18 NOTE — CARE COORDINATION
[facility] 1 Cardiac Rehabilitation [phone]    4/23/2025 8:00 AM [facility] 1 Cardiac Rehabilitation [phone]    4/24/2025 8:00 AM [facility] 1 Cardiac Rehabilitation [phone]    4/28/2025 8:00 AM [facility] 1 Cardiac Rehabilitation [phone]    4/30/2025 8:00 AM [facility] 1 Cardiac Rehabilitation [phone]    5/1/2025 8:00 AM [facility] 1 Cardiac Rehabilitation [phone]    5/5/2025 8:00 AM [facility] 1 Cardiac Rehabilitation [phone]    5/7/2025 8:00 AM [facility] 1 Cardiac Rehabilitation [phone]    5/8/2025 8:00 AM [facility] 1 Cardiac Rehabilitation [phone]    5/12/2025 8:00 AM [facility] 1 Cardiac Rehabilitation [phone]    5/14/2025 8:00 AM [facility] 1 Cardiac Rehabilitation [phone]    5/15/2025 8:00 AM [facility] RM 1 Cardiac Rehabilitation [phone]    5/19/2025 8:00 AM [facility] RM 1 Cardiac Rehabilitation [phone]    5/21/2025 8:00 AM [facility] RM 1 Cardiac Rehabilitation [phone]    5/22/2025 8:00 AM [facility] RM 1 Cardiac Rehabilitation [phone]    8/5/2025 8:40 AM Stephania Ryan APRN - CNP Primary Care [phone]    10/7/2025 9:00 AM Neil Yoder PA-C Urology [phone]    1/19/2026 1:20 PM Dk Jefferson MD Cardiology [phone]            Patient has agreed to contact primary care provider and/or specialist for any further questions, concerns, or needs.    MAN BASSETT, SKYE

## 2025-02-19 ENCOUNTER — HOSPITAL ENCOUNTER (OUTPATIENT)
Dept: CARDIAC REHAB | Age: 70
Setting detail: THERAPIES SERIES
Discharge: HOME | End: 2025-02-19
Payer: MEDICARE

## 2025-02-19 PROCEDURE — 93798 PHYS/QHP OP CAR RHAB W/ECG: CPT

## 2025-02-20 ENCOUNTER — HOSPITAL ENCOUNTER (OUTPATIENT)
Dept: CARDIAC REHAB | Age: 70
Setting detail: THERAPIES SERIES
Discharge: HOME | End: 2025-02-20
Payer: MEDICARE

## 2025-02-20 PROCEDURE — 9900000065 HC CARDIAC REHAB PHASE 3 - 1 VISIT

## 2025-02-20 PROCEDURE — 93798 PHYS/QHP OP CAR RHAB W/ECG: CPT

## 2025-02-24 ENCOUNTER — HOSPITAL ENCOUNTER (OUTPATIENT)
Dept: CARDIAC REHAB | Age: 70
Setting detail: THERAPIES SERIES
Discharge: HOME | End: 2025-02-24
Payer: MEDICARE

## 2025-02-24 PROCEDURE — 93798 PHYS/QHP OP CAR RHAB W/ECG: CPT

## 2025-02-26 ENCOUNTER — HOSPITAL ENCOUNTER (OUTPATIENT)
Dept: CARDIAC REHAB | Age: 70
Setting detail: THERAPIES SERIES
Discharge: HOME | End: 2025-02-26
Payer: MEDICARE

## 2025-02-26 ENCOUNTER — CARE COORDINATION (OUTPATIENT)
Dept: CARE COORDINATION | Age: 70
End: 2025-02-26

## 2025-02-26 PROCEDURE — 93798 PHYS/QHP OP CAR RHAB W/ECG: CPT

## 2025-02-26 NOTE — CARE COORDINATION
Patient called asking about her assistance through [employer] for her Brilinta. I let her know the office sent their portion to the company and not to me first. I just resent her whole application into the company and will let her know once I hear back.          Gabrielle Steven [facility]  CC   Medication Assistance  [place] [email or web address]    (P) [phone]  (F) [phone]

## 2025-02-27 ENCOUNTER — HOSPITAL ENCOUNTER (OUTPATIENT)
Dept: CARDIAC REHAB | Age: 70
Setting detail: THERAPIES SERIES
Discharge: HOME | End: 2025-02-27
Payer: MEDICARE

## 2025-02-27 PROCEDURE — 93798 PHYS/QHP OP CAR RHAB W/ECG: CPT

## 2025-03-03 ENCOUNTER — CARE COORDINATION (OUTPATIENT)
Dept: CARE COORDINATION | Age: 70
End: 2025-03-03

## 2025-03-03 ENCOUNTER — HOSPITAL ENCOUNTER (OUTPATIENT)
Dept: CARDIAC REHAB | Age: 70
Setting detail: THERAPIES SERIES
Discharge: HOME | End: 2025-03-03
Payer: MEDICARE

## 2025-03-03 PROCEDURE — 93798 PHYS/QHP OP CAR RHAB W/ECG: CPT

## 2025-03-03 NOTE — CARE COORDINATION
Spoke with [employer] and the patient was approved into the program for her Brilinta. This will be only until 12/31/25 as they are no longer offer assistance on the Brilanta starting in 2026. I faxed [employer] the application back and corrected what was missing. Once they get this updated they will ship the patient her medication.        Gabrielle Steven [facility]  CC   Medication Assistance  [place] [facility]    P) [phone] (F) [phone]

## 2025-03-05 ENCOUNTER — CARE COORDINATION (OUTPATIENT)
Dept: CARE COORDINATION | Age: 70
End: 2025-03-05

## 2025-03-05 ENCOUNTER — HOSPITAL ENCOUNTER (OUTPATIENT)
Dept: CARDIAC REHAB | Age: 70
Setting detail: THERAPIES SERIES
Discharge: HOME | End: 2025-03-05
Payer: MEDICARE

## 2025-03-05 PROCEDURE — 93798 PHYS/QHP OP CAR RHAB W/ECG: CPT

## 2025-03-05 NOTE — CARE COORDINATION
Spoke with [employer] who had the patients last name spelled wrong and I had them correct it. They haven't gotten the fax with the updated sig on her Brilinta yet. I will check back next week.        Gabrielle Steven [facility]  CC   Medication Assistance  [place] [facility]    (P) [phone]  (F) [phone]

## 2025-03-06 ENCOUNTER — HOSPITAL ENCOUNTER (OUTPATIENT)
Dept: CARDIAC REHAB | Age: 70
Setting detail: THERAPIES SERIES
Discharge: HOME | End: 2025-03-06
Payer: MEDICARE

## 2025-03-06 PROCEDURE — 93798 PHYS/QHP OP CAR RHAB W/ECG: CPT

## 2025-03-10 ENCOUNTER — HOSPITAL ENCOUNTER (OUTPATIENT)
Dept: CARDIAC REHAB | Age: 70
Setting detail: THERAPIES SERIES
Discharge: HOME | End: 2025-03-10
Payer: MEDICARE

## 2025-03-10 PROCEDURE — 93798 PHYS/QHP OP CAR RHAB W/ECG: CPT

## 2025-03-12 ENCOUNTER — HOSPITAL ENCOUNTER (OUTPATIENT)
Dept: WOMENS IMAGING | Age: 70
Discharge: HOME | End: 2025-03-14
Payer: MEDICARE

## 2025-03-12 ENCOUNTER — HOSPITAL ENCOUNTER (OUTPATIENT)
Dept: CARDIAC REHAB | Age: 70
Setting detail: THERAPIES SERIES
Discharge: HOME | End: 2025-03-12
Payer: MEDICARE

## 2025-03-12 DIAGNOSIS — Z12.31 SCREENING MAMMOGRAM FOR BREAST CANCER: ICD-10-CM

## 2025-03-12 PROCEDURE — 77063 BREAST TOMOSYNTHESIS BI: CPT

## 2025-03-12 PROCEDURE — 93798 PHYS/QHP OP CAR RHAB W/ECG: CPT

## 2025-03-13 ENCOUNTER — RESULTS FOLLOW-UP (OUTPATIENT)
Dept: WOMENS IMAGING | Age: 70
End: 2025-03-13

## 2025-03-13 ENCOUNTER — HOSPITAL ENCOUNTER (OUTPATIENT)
Dept: CARDIAC REHAB | Age: 70
Setting detail: THERAPIES SERIES
Discharge: HOME | End: 2025-03-13
Payer: MEDICARE

## 2025-03-13 PROCEDURE — 93798 PHYS/QHP OP CAR RHAB W/ECG: CPT

## 2025-03-14 NOTE — PROGRESS NOTES
Cardiopulmonary Rehab   Medical Nutrition Therapy Food Diary Evaluation    Patient Name: Annabel Mcknight  Registered Dietitian:  RAMYA CLEMENTS RD, LD, OCHOA, LD  Date:  3/14/2025    Dear Annabel,    Thank you for sharing your information about your eating patterns, it serves not only to help me see what you are eating, but you as well.  Eating 3 meals a day is great way to start, I am pleased to see that you are doing that.  Whole grains, fruits and vegetables, along with lean meats and low fat dairy are the cornerstones of your health.  It was great to see an apple and wheat toast on your food diary. I did not notice any vegetables, try having more non starchy vegetables daily (broccoli, cauliflower, green beans, etc.). Include more fruit on a daily basis. With that in mind, look below for some suggestions.    Your Rate Your Plate (RYP) Score was: 48, which means: you are making many healthy choices    Recommendations from the Dietitian based on your RYP and Food Diary / activity record to improve health and decrease risk factors    Continue to have 3 well balanced meals with a variety of lean meats, fresh fruits, vegetables, whole grains and low fat dairy.  Include 2-3 cups non starchy vegetables daily (salad, carrots, cabbage, etc.).  Have 1.5-2 cups fruit (fresh, frozen, or canned in lite syrup or own juice).   Include whole grains such as old fashioned oatmeal, brown rice, whole wheat buns, etc.   Drink a minimum of 56 oz water daily.   Bake, broil, or grill protein foods, avoid frying them.    If there are many things to change, try making change with one recommendation, then move on from there.  Recommendations are based on guidelines from the American Heart Association, American Diabetes Association and current literature.    Feel free to call with questions or concerns [phone] or [phone]          RAMYA CLEMENTS RD, LYNNE PACKER, LD

## 2025-03-17 ENCOUNTER — HOSPITAL ENCOUNTER (OUTPATIENT)
Dept: CARDIAC REHAB | Age: 70
Setting detail: THERAPIES SERIES
Discharge: HOME | End: 2025-03-17
Payer: MEDICARE

## 2025-03-17 ENCOUNTER — CARE COORDINATION (OUTPATIENT)
Dept: CARE COORDINATION | Age: 70
End: 2025-03-17

## 2025-03-17 PROCEDURE — 93798 PHYS/QHP OP CAR RHAB W/ECG: CPT

## 2025-03-17 NOTE — CARE COORDINATION
I called [employer] to check on the shipment of the patients Brilinta. It was shipped out to her on 3/11/25. She is good to get this medication the rest of the year but after 2025 the patient assistance on Brilinta will no longer be available.        Gabrilele Steven [facility]  CC   Medication Assistance  [place] [facility]    (P) [phone]  (F) [phone]

## 2025-03-19 ENCOUNTER — HOSPITAL ENCOUNTER (OUTPATIENT)
Dept: CARDIAC REHAB | Age: 70
Setting detail: THERAPIES SERIES
Discharge: HOME | End: 2025-03-19
Payer: MEDICARE

## 2025-03-19 PROCEDURE — 93798 PHYS/QHP OP CAR RHAB W/ECG: CPT

## 2025-03-20 ENCOUNTER — HOSPITAL ENCOUNTER (OUTPATIENT)
Dept: CARDIAC REHAB | Age: 70
Setting detail: THERAPIES SERIES
Discharge: HOME | End: 2025-03-20
Payer: MEDICARE

## 2025-03-20 PROCEDURE — 93798 PHYS/QHP OP CAR RHAB W/ECG: CPT

## 2025-03-24 ENCOUNTER — HOSPITAL ENCOUNTER (OUTPATIENT)
Dept: CARDIAC REHAB | Age: 70
Setting detail: THERAPIES SERIES
Discharge: HOME | End: 2025-03-24
Payer: MEDICARE

## 2025-03-24 DIAGNOSIS — I25.10 CORONARY ARTERY DISEASE INVOLVING NATIVE CORONARY ARTERY OF NATIVE HEART WITHOUT ANGINA PECTORIS: ICD-10-CM

## 2025-03-24 DIAGNOSIS — I35.0 MILD AORTIC STENOSIS: ICD-10-CM

## 2025-03-24 DIAGNOSIS — I20.9 ANGINA, CLASS III: ICD-10-CM

## 2025-03-24 DIAGNOSIS — Z95.820 S/P ANGIOPLASTY WITH STENT: ICD-10-CM

## 2025-03-24 DIAGNOSIS — E66.811 CLASS 1 OBESITY WITH BODY MASS INDEX (BMI) OF 33.0 TO 33.9 IN ADULT, UNSPECIFIED OBESITY TYPE, UNSPECIFIED WHETHER SERIOUS COMORBIDITY PRESENT: ICD-10-CM

## 2025-03-24 DIAGNOSIS — R07.89 CHEST DISCOMFORT: ICD-10-CM

## 2025-03-24 DIAGNOSIS — I10 ESSENTIAL HYPERTENSION: ICD-10-CM

## 2025-03-24 PROCEDURE — 93798 PHYS/QHP OP CAR RHAB W/ECG: CPT

## 2025-03-25 RX ORDER — AMLODIPINE BESYLATE 5 MG/1
5 TABLET ORAL DAILY
Qty: 90 TABLET | Refills: 3 | Status: SHIPPED | OUTPATIENT
Start: 2025-03-25

## 2025-03-26 ENCOUNTER — HOSPITAL ENCOUNTER (OUTPATIENT)
Age: 70
Discharge: HOME | End: 2025-03-26
Payer: MEDICARE

## 2025-03-26 ENCOUNTER — RESULTS FOLLOW-UP (OUTPATIENT)
Dept: PRIMARY CARE CLINIC | Age: 70
End: 2025-03-26

## 2025-03-26 ENCOUNTER — HOSPITAL ENCOUNTER (OUTPATIENT)
Dept: CARDIAC REHAB | Age: 70
Setting detail: THERAPIES SERIES
Discharge: HOME | End: 2025-03-26
Payer: MEDICARE

## 2025-03-26 DIAGNOSIS — R73.03 PREDIABETES: ICD-10-CM

## 2025-03-26 DIAGNOSIS — E78.5 HYPERLIPIDEMIA, UNSPECIFIED HYPERLIPIDEMIA TYPE: ICD-10-CM

## 2025-03-26 DIAGNOSIS — I10 ESSENTIAL HYPERTENSION: ICD-10-CM

## 2025-03-26 LAB
ALT SERPL-CCNC: 33 U/L (ref 10–35)
ANION GAP SERPL CALCULATED.3IONS-SCNC: 9 MMOL/L (ref 9–16)
AST SERPL-CCNC: 25 U/L (ref 10–35)
BUN SERPL-MCNC: 13 MG/DL (ref 8–23)
BUN/CREAT SERPL: 16 (ref 9–20)
CALCIUM SERPL-MCNC: 9.4 MG/DL (ref 8.6–10.4)
CHLORIDE SERPL-SCNC: 106 MMOL/L (ref 98–107)
CHOLEST SERPL-MCNC: 104 MG/DL (ref 0–199)
CHOLESTEROL/HDL RATIO: 2.4
CO2 SERPL-SCNC: 29 MMOL/L (ref 20–31)
CREAT SERPL-MCNC: 0.8 MG/DL (ref 0.5–0.9)
ERYTHROCYTE [DISTWIDTH] IN BLOOD BY AUTOMATED COUNT: 12.5 % (ref 11.8–14.4)
EST. AVERAGE GLUCOSE BLD GHB EST-MCNC: 117 MG/DL
GFR, ESTIMATED: 76 ML/MIN/1.73M2
GLUCOSE SERPL-MCNC: 110 MG/DL (ref 74–99)
HBA1C MFR BLD: 5.7 % (ref 4–6)
HCT VFR BLD AUTO: 44.2 % (ref 36.3–47.1)
HDLC SERPL-MCNC: 44 MG/DL
HGB BLD-MCNC: 14.2 G/DL (ref 11.9–15.1)
LDLC SERPL CALC-MCNC: 41 MG/DL (ref 0–100)
MCH RBC QN AUTO: 29.2 PG (ref 25.2–33.5)
MCHC RBC AUTO-ENTMCNC: 32.1 G/DL (ref 28.4–34.8)
MCV RBC AUTO: 90.8 FL (ref 82.6–102.9)
NRBC BLD-RTO: 0 PER 100 WBC
PLATELET # BLD AUTO: 235 K/UL (ref 138–453)
PMV BLD AUTO: 10.3 FL (ref 8.1–13.5)
POTASSIUM SERPL-SCNC: 4.5 MMOL/L (ref 3.7–5.3)
RBC # BLD AUTO: 4.87 M/UL (ref 3.95–5.11)
SODIUM SERPL-SCNC: 144 MMOL/L (ref 136–145)
TRIGL SERPL-MCNC: 93 MG/DL
VLDLC SERPL CALC-MCNC: 19 MG/DL (ref 1–30)
WBC OTHER # BLD: 6.5 K/UL (ref 3.5–11.3)

## 2025-03-26 PROCEDURE — 84460 ALANINE AMINO (ALT) (SGPT): CPT

## 2025-03-26 PROCEDURE — 80061 LIPID PANEL: CPT

## 2025-03-26 PROCEDURE — 36415 COLL VENOUS BLD VENIPUNCTURE: CPT

## 2025-03-26 PROCEDURE — 85027 COMPLETE CBC AUTOMATED: CPT

## 2025-03-26 PROCEDURE — 83036 HEMOGLOBIN GLYCOSYLATED A1C: CPT

## 2025-03-26 PROCEDURE — 80048 BASIC METABOLIC PNL TOTAL CA: CPT

## 2025-03-26 PROCEDURE — 84450 TRANSFERASE (AST) (SGOT): CPT

## 2025-03-26 PROCEDURE — 93798 PHYS/QHP OP CAR RHAB W/ECG: CPT

## 2025-03-27 ENCOUNTER — HOSPITAL ENCOUNTER (OUTPATIENT)
Dept: CARDIAC REHAB | Age: 70
Setting detail: THERAPIES SERIES
Discharge: HOME | End: 2025-03-27
Payer: MEDICARE

## 2025-03-27 PROCEDURE — 93798 PHYS/QHP OP CAR RHAB W/ECG: CPT

## 2025-03-31 ENCOUNTER — HOSPITAL ENCOUNTER (OUTPATIENT)
Dept: CARDIAC REHAB | Age: 70
Setting detail: THERAPIES SERIES
Discharge: HOME | End: 2025-03-31
Payer: MEDICARE

## 2025-03-31 PROCEDURE — 93798 PHYS/QHP OP CAR RHAB W/ECG: CPT

## 2025-04-02 ENCOUNTER — HOSPITAL ENCOUNTER (OUTPATIENT)
Dept: CARDIAC REHAB | Age: 70
Setting detail: THERAPIES SERIES
Discharge: HOME | End: 2025-04-02
Payer: MEDICARE

## 2025-04-02 PROCEDURE — 93798 PHYS/QHP OP CAR RHAB W/ECG: CPT

## 2025-04-03 ENCOUNTER — HOSPITAL ENCOUNTER (OUTPATIENT)
Dept: CARDIAC REHAB | Age: 70
Setting detail: THERAPIES SERIES
Discharge: HOME | End: 2025-04-03
Payer: MEDICARE

## 2025-04-03 PROCEDURE — 93798 PHYS/QHP OP CAR RHAB W/ECG: CPT

## 2025-04-07 ENCOUNTER — HOSPITAL ENCOUNTER (OUTPATIENT)
Dept: CARDIAC REHAB | Age: 70
Setting detail: THERAPIES SERIES
Discharge: HOME | End: 2025-04-07
Payer: MEDICARE

## 2025-04-07 PROCEDURE — 93798 PHYS/QHP OP CAR RHAB W/ECG: CPT

## 2025-04-09 ENCOUNTER — HOSPITAL ENCOUNTER (OUTPATIENT)
Dept: CARDIAC REHAB | Age: 70
Setting detail: THERAPIES SERIES
Discharge: HOME | End: 2025-04-09
Payer: MEDICARE

## 2025-04-09 PROCEDURE — 93798 PHYS/QHP OP CAR RHAB W/ECG: CPT

## 2025-04-10 ENCOUNTER — HOSPITAL ENCOUNTER (OUTPATIENT)
Dept: CARDIAC REHAB | Age: 70
Setting detail: THERAPIES SERIES
Discharge: HOME | End: 2025-04-10
Payer: MEDICARE

## 2025-04-10 PROCEDURE — 93798 PHYS/QHP OP CAR RHAB W/ECG: CPT

## 2025-04-14 ENCOUNTER — HOSPITAL ENCOUNTER (OUTPATIENT)
Dept: CARDIAC REHAB | Age: 70
Setting detail: THERAPIES SERIES
Discharge: HOME | End: 2025-04-14
Payer: MEDICARE

## 2025-04-14 PROCEDURE — 93798 PHYS/QHP OP CAR RHAB W/ECG: CPT

## 2025-04-16 ENCOUNTER — HOSPITAL ENCOUNTER (OUTPATIENT)
Dept: CARDIAC REHAB | Age: 70
Setting detail: THERAPIES SERIES
Discharge: HOME | End: 2025-04-16
Payer: MEDICARE

## 2025-04-16 PROCEDURE — 93798 PHYS/QHP OP CAR RHAB W/ECG: CPT

## 2025-04-17 ENCOUNTER — HOSPITAL ENCOUNTER (OUTPATIENT)
Dept: CARDIAC REHAB | Age: 70
Setting detail: THERAPIES SERIES
Discharge: HOME | End: 2025-04-17
Payer: MEDICARE

## 2025-04-17 PROCEDURE — 93798 PHYS/QHP OP CAR RHAB W/ECG: CPT

## 2025-04-21 ENCOUNTER — HOSPITAL ENCOUNTER (OUTPATIENT)
Dept: CARDIAC REHAB | Age: 70
Setting detail: THERAPIES SERIES
Discharge: HOME | End: 2025-04-21
Payer: MEDICARE

## 2025-04-21 PROCEDURE — 93798 PHYS/QHP OP CAR RHAB W/ECG: CPT

## 2025-04-23 ENCOUNTER — APPOINTMENT (OUTPATIENT)
Dept: CARDIAC REHAB | Age: 70
End: 2025-04-23
Payer: MEDICARE

## 2025-04-23 ENCOUNTER — OFFICE VISIT (OUTPATIENT)
Dept: PRIMARY CARE CLINIC | Age: 70
End: 2025-04-23
Payer: MEDICARE

## 2025-04-23 ENCOUNTER — HOSPITAL ENCOUNTER (OUTPATIENT)
Age: 70
Discharge: HOME | End: 2025-04-23
Payer: MEDICARE

## 2025-04-23 ENCOUNTER — HOSPITAL ENCOUNTER (OUTPATIENT)
Dept: CT IMAGING | Age: 70
Discharge: HOME | End: 2025-04-25
Payer: MEDICARE

## 2025-04-23 ENCOUNTER — HOSPITAL ENCOUNTER (OUTPATIENT)
Dept: GENERAL RADIOLOGY | Age: 70
Discharge: HOME | End: 2025-04-25
Payer: MEDICARE

## 2025-04-23 VITALS
SYSTOLIC BLOOD PRESSURE: 110 MMHG | DIASTOLIC BLOOD PRESSURE: 62 MMHG | TEMPERATURE: 96.6 F | HEART RATE: 66 BPM | BODY MASS INDEX: 33.81 KG/M2 | WEIGHT: 197 LBS | OXYGEN SATURATION: 99 %

## 2025-04-23 DIAGNOSIS — M54.50 ACUTE MIDLINE LOW BACK PAIN WITHOUT SCIATICA: ICD-10-CM

## 2025-04-23 DIAGNOSIS — Z13.29 THYROID DISORDER SCREEN: ICD-10-CM

## 2025-04-23 DIAGNOSIS — I10 ESSENTIAL HYPERTENSION: ICD-10-CM

## 2025-04-23 DIAGNOSIS — R10.84 GENERALIZED ABDOMINAL PAIN: ICD-10-CM

## 2025-04-23 DIAGNOSIS — R10.32 LEFT LOWER QUADRANT ABDOMINAL PAIN: ICD-10-CM

## 2025-04-23 DIAGNOSIS — M54.50 ACUTE BILATERAL LOW BACK PAIN WITHOUT SCIATICA: ICD-10-CM

## 2025-04-23 DIAGNOSIS — R10.84 GENERALIZED ABDOMINAL PAIN: Primary | ICD-10-CM

## 2025-04-23 DIAGNOSIS — R23.3 ABNORMAL BRUISING: ICD-10-CM

## 2025-04-23 LAB
ALBUMIN SERPL-MCNC: 4 G/DL (ref 3.5–5.2)
ALBUMIN/GLOB SERPL: 1.2 {RATIO} (ref 1–2.5)
ALP SERPL-CCNC: 107 U/L (ref 35–104)
ALT SERPL-CCNC: 32 U/L (ref 10–35)
ANION GAP SERPL CALCULATED.3IONS-SCNC: 11 MMOL/L (ref 9–16)
AST SERPL-CCNC: 27 U/L (ref 10–35)
BASOPHILS # BLD: <0.03 K/UL (ref 0–0.2)
BASOPHILS NFR BLD: 0 % (ref 0–2)
BILIRUB SERPL-MCNC: 0.4 MG/DL (ref 0–1.2)
BILIRUBIN, POC: 0
BLOOD URINE, POC: NORMAL
BUN SERPL-MCNC: 10 MG/DL (ref 8–23)
BUN/CREAT SERPL: 11 (ref 9–20)
CALCIUM SERPL-MCNC: 9.7 MG/DL (ref 8.6–10.4)
CHLORIDE SERPL-SCNC: 102 MMOL/L (ref 98–107)
CLARITY, POC: NORMAL
CO2 SERPL-SCNC: 30 MMOL/L (ref 20–31)
COLOR, POC: NORMAL
CREAT SERPL-MCNC: 0.9 MG/DL (ref 0.5–0.9)
EOSINOPHIL # BLD: 0.09 K/UL (ref 0–0.44)
EOSINOPHILS RELATIVE PERCENT: 1 % (ref 1–4)
ERYTHROCYTE [DISTWIDTH] IN BLOOD BY AUTOMATED COUNT: 12.5 % (ref 11.8–14.4)
GFR, ESTIMATED: 73 ML/MIN/1.73M2
GLUCOSE SERPL-MCNC: 116 MG/DL (ref 74–99)
GLUCOSE URINE, POC: NORMAL MG/DL
HCT VFR BLD AUTO: 43.3 % (ref 36.3–47.1)
HGB BLD-MCNC: 14 G/DL (ref 11.9–15.1)
IMM GRANULOCYTES # BLD AUTO: <0.03 K/UL (ref 0–0.3)
IMM GRANULOCYTES NFR BLD: 0 %
INR PPP: 1
KETONES, POC: NORMAL MG/DL
LEUKOCYTE EST, POC: NORMAL
LYMPHOCYTES NFR BLD: 1.92 K/UL (ref 1.1–3.7)
LYMPHOCYTES RELATIVE PERCENT: 26 % (ref 24–43)
MCH RBC QN AUTO: 29.3 PG (ref 25.2–33.5)
MCHC RBC AUTO-ENTMCNC: 32.3 G/DL (ref 28.4–34.8)
MCV RBC AUTO: 90.6 FL (ref 82.6–102.9)
MONOCYTES NFR BLD: 0.78 K/UL (ref 0.1–1.2)
MONOCYTES NFR BLD: 10 % (ref 3–12)
NEUTROPHILS NFR BLD: 63 % (ref 36–65)
NEUTS SEG NFR BLD: 4.72 K/UL (ref 1.5–8.1)
NITRITE, POC: NORMAL
NRBC BLD-RTO: 0 PER 100 WBC
PARTIAL THROMBOPLASTIN TIME: 24.3 SEC (ref 26.8–34.8)
PH, POC: 6.5
PLATELET # BLD AUTO: 233 K/UL (ref 138–453)
PMV BLD AUTO: 9.6 FL (ref 8.1–13.5)
POTASSIUM SERPL-SCNC: 4.3 MMOL/L (ref 3.7–5.3)
PROT SERPL-MCNC: 7.5 G/DL (ref 6.6–8.7)
PROTEIN, POC: NORMAL MG/DL
PROTHROMBIN TIME: 12.8 SEC (ref 11.7–14.1)
RBC # BLD AUTO: 4.78 M/UL (ref 3.95–5.11)
SODIUM SERPL-SCNC: 143 MMOL/L (ref 136–145)
SPECIFIC GRAVITY, POC: 1.01
TSH SERPL DL<=0.05 MIU/L-ACNC: 3.23 UIU/ML (ref 0.27–4.2)
UROBILINOGEN, POC: 0.2 MG/DL
WBC OTHER # BLD: 7.5 K/UL (ref 3.5–11.3)

## 2025-04-23 PROCEDURE — 72100 X-RAY EXAM L-S SPINE 2/3 VWS: CPT

## 2025-04-23 PROCEDURE — 85025 COMPLETE CBC W/AUTO DIFF WBC: CPT

## 2025-04-23 PROCEDURE — 1123F ACP DISCUSS/DSCN MKR DOCD: CPT | Performed by: NURSE PRACTITIONER

## 2025-04-23 PROCEDURE — 3074F SYST BP LT 130 MM HG: CPT | Performed by: NURSE PRACTITIONER

## 2025-04-23 PROCEDURE — 99214 OFFICE O/P EST MOD 30 MIN: CPT | Performed by: NURSE PRACTITIONER

## 2025-04-23 PROCEDURE — 85730 THROMBOPLASTIN TIME PARTIAL: CPT

## 2025-04-23 PROCEDURE — 81002 URINALYSIS NONAUTO W/O SCOPE: CPT | Performed by: NURSE PRACTITIONER

## 2025-04-23 PROCEDURE — 85610 PROTHROMBIN TIME: CPT

## 2025-04-23 PROCEDURE — 36415 COLL VENOUS BLD VENIPUNCTURE: CPT

## 2025-04-23 PROCEDURE — 84443 ASSAY THYROID STIM HORMONE: CPT

## 2025-04-23 PROCEDURE — 80053 COMPREHEN METABOLIC PANEL: CPT

## 2025-04-23 PROCEDURE — 74177 CT ABD & PELVIS W/CONTRAST: CPT

## 2025-04-23 PROCEDURE — 6360000004 HC RX CONTRAST MEDICATION: Performed by: NURSE PRACTITIONER

## 2025-04-23 PROCEDURE — 3078F DIAST BP <80 MM HG: CPT | Performed by: NURSE PRACTITIONER

## 2025-04-23 RX ORDER — IOPAMIDOL 755 MG/ML
18 INJECTION, SOLUTION INTRAVASCULAR
Status: COMPLETED | OUTPATIENT
Start: 2025-04-23 | End: 2025-04-23

## 2025-04-23 RX ORDER — IOPAMIDOL 755 MG/ML
75 INJECTION, SOLUTION INTRAVASCULAR
Status: COMPLETED | OUTPATIENT
Start: 2025-04-23 | End: 2025-04-23

## 2025-04-23 RX ADMIN — IOPAMIDOL 75 ML: 755 INJECTION, SOLUTION INTRAVENOUS at 10:03

## 2025-04-23 RX ADMIN — IOPAMIDOL 18 ML: 755 INJECTION, SOLUTION INTRAVENOUS at 10:03

## 2025-04-23 NOTE — PROGRESS NOTES
LABA1C 5.7 03/26/2025 07:43 AM       Assessment/Plan:      Diagnosis Orders   1. Generalized abdominal pain  Protime-INR    APTT    CBC with Auto Differential    CT ABDOMEN PELVIS W IV CONTRAST Additional Contrast? Oral    Comprehensive Metabolic Panel    TSH reflex to FT4      2. Acute bilateral low back pain without sciatica  POCT Urinalysis Dipstick no Micro      3. Abnormal bruising  Protime-INR    APTT    CBC with Auto Differential    Comprehensive Metabolic Panel    TSH reflex to FT4      4. Thyroid disorder screen  TSH reflex to FT4      5. Left lower quadrant abdominal pain  CT ABDOMEN PELVIS W IV CONTRAST Additional Contrast? Oral      6. Acute midline low back pain without sciatica  XR LUMBAR SPINE (2-3 VIEWS)      7. Essential hypertension  TSH reflex to FT4        Bruising:  - Discussed with patient likely related to her Brilinta and aspirin use  - However, based on the atypical location such as feet and abdomen, will complete above labs for further evaluation    Back pain/abdominal pain:  - POC UA unremarkable, negative leukocytes, nitrites, protein, or blood.  Suspicion for UTI low at this time  - Patient is significantly tender in her lower abdomen, and upon history she states her pelvic pain was more left lower quadrant.  Cannot rule out diverticulitis, specifically with her recent abdominal bloating.  Also cannot rule out kidney stone based off her left mid back pain and lower abdominal pain, though less likely given her UA results  - Will complete stat CT abdomen pelvis for further evaluation  - Given her tenderness to palpation over top lumbar spine, will also complete lumbar x-ray  - Will call with above results and update treatment plan as appropriate  - Reviewed emergent signs and symptoms when to seek care at the ED    -- Reviewed emergent signs and symptoms and when to seek care at the emergency department and/or call 911     Completed Refills   Requested Prescriptions      No

## 2025-04-24 ENCOUNTER — RESULTS FOLLOW-UP (OUTPATIENT)
Dept: PRIMARY CARE CLINIC | Age: 70
End: 2025-04-24

## 2025-04-24 ENCOUNTER — HOSPITAL ENCOUNTER (OUTPATIENT)
Dept: CARDIAC REHAB | Age: 70
Setting detail: THERAPIES SERIES
Discharge: HOME | End: 2025-04-24
Payer: MEDICARE

## 2025-04-24 PROCEDURE — 93798 PHYS/QHP OP CAR RHAB W/ECG: CPT

## 2025-04-24 RX ORDER — BACLOFEN 10 MG/1
10 TABLET ORAL 2 TIMES DAILY PRN
Qty: 21 TABLET | Refills: 0 | Status: SHIPPED | OUTPATIENT
Start: 2025-04-24

## 2025-04-24 RX ORDER — METHYLPREDNISOLONE 4 MG/1
TABLET ORAL
Qty: 1 KIT | Refills: 0 | Status: SHIPPED | OUTPATIENT
Start: 2025-04-24 | End: 2025-04-30

## 2025-04-28 ENCOUNTER — HOSPITAL ENCOUNTER (OUTPATIENT)
Dept: CARDIAC REHAB | Age: 70
Setting detail: THERAPIES SERIES
Discharge: HOME | End: 2025-04-28
Payer: MEDICARE

## 2025-04-28 ENCOUNTER — RESULTS FOLLOW-UP (OUTPATIENT)
Dept: PRIMARY CARE CLINIC | Age: 70
End: 2025-04-28

## 2025-04-28 PROCEDURE — 93798 PHYS/QHP OP CAR RHAB W/ECG: CPT

## 2025-04-29 ENCOUNTER — OFFICE VISIT (OUTPATIENT)
Dept: PRIMARY CARE CLINIC | Age: 70
End: 2025-04-29
Payer: MEDICARE

## 2025-04-29 VITALS
WEIGHT: 196 LBS | TEMPERATURE: 96.8 F | OXYGEN SATURATION: 97 % | HEART RATE: 52 BPM | DIASTOLIC BLOOD PRESSURE: 62 MMHG | SYSTOLIC BLOOD PRESSURE: 112 MMHG | BODY MASS INDEX: 33.64 KG/M2

## 2025-04-29 DIAGNOSIS — D18.09 SPINAL HEMANGIOMA: ICD-10-CM

## 2025-04-29 DIAGNOSIS — M54.50 ACUTE LEFT-SIDED LOW BACK PAIN WITHOUT SCIATICA: Primary | ICD-10-CM

## 2025-04-29 PROCEDURE — 1123F ACP DISCUSS/DSCN MKR DOCD: CPT | Performed by: NURSE PRACTITIONER

## 2025-04-29 PROCEDURE — 3074F SYST BP LT 130 MM HG: CPT | Performed by: NURSE PRACTITIONER

## 2025-04-29 PROCEDURE — G2211 COMPLEX E/M VISIT ADD ON: HCPCS | Performed by: NURSE PRACTITIONER

## 2025-04-29 PROCEDURE — 99214 OFFICE O/P EST MOD 30 MIN: CPT | Performed by: NURSE PRACTITIONER

## 2025-04-29 PROCEDURE — 1159F MED LIST DOCD IN RCRD: CPT | Performed by: NURSE PRACTITIONER

## 2025-04-29 PROCEDURE — 3078F DIAST BP <80 MM HG: CPT | Performed by: NURSE PRACTITIONER

## 2025-04-29 NOTE — PROGRESS NOTES
04/23/2025 08:47 AM    ALT 32 04/23/2025 08:47 AM     Lab Results   Component Value Date/Time    WBC 7.5 04/23/2025 08:47 AM    RBC 4.78 04/23/2025 08:47 AM    RBC 4.52 05/08/2012 02:33 PM    HGB 14.0 04/23/2025 08:47 AM    HCT 43.3 04/23/2025 08:47 AM    MCV 90.6 04/23/2025 08:47 AM    MCH 29.3 04/23/2025 08:47 AM    MCHC 32.3 04/23/2025 08:47 AM    RDW 12.5 04/23/2025 08:47 AM     04/23/2025 08:47 AM     05/08/2012 02:33 PM    MPV 9.6 04/23/2025 08:47 AM     Lab Results   Component Value Date/Time    TSH 3.23 04/23/2025 08:46 AM     Lab Results   Component Value Date/Time    CHOL 104 03/26/2025 07:44 AM    LDL 41 03/26/2025 07:44 AM    HDL 44 03/26/2025 07:44 AM    LABA1C 5.7 03/26/2025 07:43 AM       Assessment/Plan:      Diagnosis Orders   1. Acute left-sided low back pain without sciatica [M54.50]        2. Spinal hemangioma  MRI LUMBAR SPINE W WO CONTRAST        Abdominal pain:  - Resolved  - Reviewed CT abdomen pelvis results with the patient, no acute findings    Back pain:  - I believe this to be musculoskeletal, improving with Medrol Dosepak as well as baclofen  - Continue to monitor and notify office if symptoms worsen or persist  - Reviewed lumbar spine x-ray as noted above in HPI including incidental finding of lumbar hemangioma.  Questionable if this finding is related to her current back pain.  Discussed need to further characterize this hemangioma with MRI of the lumbar spine, she is agreeable.  Will call with result.  Consider involvement with orthopedics/spinal specialist if needed  - Notify office if any concerns     -- Reviewed emergent signs and symptoms and when to seek care at the emergency department and/or call 911     Completed Refills   Requested Prescriptions      No prescriptions requested or ordered in this encounter       Orders Placed This Encounter   Procedures    MRI LUMBAR SPINE W WO CONTRAST     Standing Status:   Future     Expected Date:   4/29/2025     Expiration

## 2025-04-30 ENCOUNTER — HOSPITAL ENCOUNTER (OUTPATIENT)
Dept: CARDIAC REHAB | Age: 70
Setting detail: THERAPIES SERIES
Discharge: HOME | End: 2025-04-30
Payer: MEDICARE

## 2025-04-30 PROCEDURE — 93798 PHYS/QHP OP CAR RHAB W/ECG: CPT

## 2025-05-12 ENCOUNTER — OFFICE VISIT (OUTPATIENT)
Dept: PRIMARY CARE CLINIC | Age: 70
End: 2025-05-12
Payer: MEDICARE

## 2025-05-12 ENCOUNTER — HOSPITAL ENCOUNTER (OUTPATIENT)
Age: 70
Discharge: HOME | End: 2025-05-12
Payer: MEDICARE

## 2025-05-12 ENCOUNTER — HOSPITAL ENCOUNTER (OUTPATIENT)
Dept: MRI IMAGING | Age: 70
Discharge: HOME | End: 2025-05-14
Payer: MEDICARE

## 2025-05-12 VITALS
BODY MASS INDEX: 33.7 KG/M2 | RESPIRATION RATE: 16 BRPM | DIASTOLIC BLOOD PRESSURE: 70 MMHG | HEART RATE: 73 BPM | SYSTOLIC BLOOD PRESSURE: 126 MMHG | HEIGHT: 64 IN | WEIGHT: 197.4 LBS | TEMPERATURE: 96.8 F | OXYGEN SATURATION: 98 %

## 2025-05-12 DIAGNOSIS — E78.5 HYPERLIPIDEMIA, UNSPECIFIED HYPERLIPIDEMIA TYPE: ICD-10-CM

## 2025-05-12 DIAGNOSIS — G90.1 DYSAUTONOMIA (HCC): ICD-10-CM

## 2025-05-12 DIAGNOSIS — R73.9 HYPERGLYCEMIA: ICD-10-CM

## 2025-05-12 DIAGNOSIS — Z13.21 ENCOUNTER FOR VITAMIN DEFICIENCY SCREENING: ICD-10-CM

## 2025-05-12 DIAGNOSIS — I10 ESSENTIAL HYPERTENSION: Primary | ICD-10-CM

## 2025-05-12 DIAGNOSIS — D18.09 SPINAL HEMANGIOMA: ICD-10-CM

## 2025-05-12 DIAGNOSIS — E55.9 VITAMIN D DEFICIENCY: ICD-10-CM

## 2025-05-12 DIAGNOSIS — R06.02 SHORTNESS OF BREATH: ICD-10-CM

## 2025-05-12 PROCEDURE — 3078F DIAST BP <80 MM HG: CPT | Performed by: NURSE PRACTITIONER

## 2025-05-12 PROCEDURE — 72158 MRI LUMBAR SPINE W/O & W/DYE: CPT

## 2025-05-12 PROCEDURE — 6360000004 HC RX CONTRAST MEDICATION: Performed by: NURSE PRACTITIONER

## 2025-05-12 PROCEDURE — 1159F MED LIST DOCD IN RCRD: CPT | Performed by: NURSE PRACTITIONER

## 2025-05-12 PROCEDURE — A9579 GAD-BASE MR CONTRAST NOS,1ML: HCPCS | Performed by: NURSE PRACTITIONER

## 2025-05-12 PROCEDURE — G2211 COMPLEX E/M VISIT ADD ON: HCPCS | Performed by: NURSE PRACTITIONER

## 2025-05-12 PROCEDURE — 1123F ACP DISCUSS/DSCN MKR DOCD: CPT | Performed by: NURSE PRACTITIONER

## 2025-05-12 PROCEDURE — 99214 OFFICE O/P EST MOD 30 MIN: CPT | Performed by: NURSE PRACTITIONER

## 2025-05-12 PROCEDURE — 3074F SYST BP LT 130 MM HG: CPT | Performed by: NURSE PRACTITIONER

## 2025-05-12 RX ADMIN — GADOTERIDOL 17 ML: 279.3 INJECTION, SOLUTION INTRAVENOUS at 15:08

## 2025-05-12 NOTE — PROGRESS NOTES
Name: Annabel Mcknight  : 1955         Chief Complaint:     Chief Complaint   Patient presents with    Hypertension     Patient is here to follow up for hypertension. She is checking it at home and stated it is running around 130's/60's. She stated that she does have shortness of breath with exertion, as well as occasional palpitations. She denies chest pain at this time.     Back Pain     Patient was also in recently for back pain and she stated overall it has improved. She is having her MRI completed this afternoon.        History of Present Illness:      Annabel Mcknight is a 69 y.o.  female who presents with Hypertension (Patient is here to follow up for hypertension. She is checking it at home and stated it is running around 130's/60's. She stated that she does have shortness of breath with exertion, as well as occasional palpitations. She denies chest pain at this time. ) and Back Pain (Patient was also in recently for back pain and she stated overall it has improved. She is having her MRI completed this afternoon. )      HPI    Hypertension:  Current treatment includes amlodipine 5 mg daily, metoprolol succinate 50 mg daily, and Lasix 40 mg daily.  She is monitoring her blood pressure at home and this is averaging 130s/60s.  She also has history of dysautonomia and notes some dizziness with changing positions.  Denies chest pain.  Admits chronic shortness of breath that has been ongoing for several months with exertion as well as palpitations. She does have history of CAD and is taking Brilinta. She does also get some jaw \"tightness\". Known hx of angina. Following with [facility] cardiology. She completed cardiac rehab last week.     Back pain:  Patient was evaluated for back pain 2025.  Today she states that her back pain has overall improved.  2025 x-ray lumbar spine showed incidental L3 vertebral body hemangioma.  Scheduled for MRI lumbar spine today.    Past Medical History:     Past

## 2025-05-14 ENCOUNTER — RESULTS FOLLOW-UP (OUTPATIENT)
Dept: PRIMARY CARE CLINIC | Age: 70
End: 2025-05-14

## 2025-05-14 DIAGNOSIS — D18.09 SPINAL HEMANGIOMA: Primary | ICD-10-CM

## 2025-05-15 NOTE — TELEPHONE ENCOUNTER
----- Message from AIDEE Bush CNP sent at 5/14/2025  4:30 PM EDT -----  Please notify patient MRI lumbar spine does show several bulging discs.  There are also hemangiomas within several vertebral bodies, as discussed during most recent appointment.  Based on these findings, I recommend she be evaluated by neurosurgery for consult.  I recommend either Dr. Canales at [facility] or [facility].  Please let me know if agreeable to consult

## 2025-05-27 RX ORDER — PANTOPRAZOLE SODIUM 20 MG/1
20 TABLET, DELAYED RELEASE ORAL
Qty: 90 TABLET | Refills: 1 | Status: SHIPPED | OUTPATIENT
Start: 2025-05-27

## 2025-05-30 ENCOUNTER — TRANSCRIBE ORDERS (OUTPATIENT)
Dept: ADMINISTRATIVE | Age: 70
End: 2025-05-30

## 2025-05-30 DIAGNOSIS — R29.2 ABNORMAL REFLEXES: Primary | ICD-10-CM

## 2025-05-30 DIAGNOSIS — R26.9 ABNORMAL TANDEM GAIT TEST: ICD-10-CM

## 2025-06-02 ENCOUNTER — TRANSCRIBE ORDERS (OUTPATIENT)
Dept: ADMINISTRATIVE | Age: 70
End: 2025-06-02

## 2025-06-02 DIAGNOSIS — R29.2 ABNORMAL REFLEXES: ICD-10-CM

## 2025-06-02 DIAGNOSIS — M54.2 NECK PAIN: Primary | ICD-10-CM

## 2025-06-02 DIAGNOSIS — R26.9 ABNORMAL TANDEM GAIT TEST: ICD-10-CM

## 2025-06-10 ENCOUNTER — APPOINTMENT (OUTPATIENT)
Dept: MRI IMAGING | Age: 70
End: 2025-06-10
Payer: MEDICARE

## 2025-06-11 ENCOUNTER — HOSPITAL ENCOUNTER (OUTPATIENT)
Dept: MRI IMAGING | Age: 70
Discharge: HOME | End: 2025-06-13
Payer: MEDICARE

## 2025-06-11 DIAGNOSIS — M54.2 NECK PAIN: ICD-10-CM

## 2025-06-11 DIAGNOSIS — R29.2 ABNORMAL REFLEXES: ICD-10-CM

## 2025-06-11 DIAGNOSIS — R26.9 ABNORMAL TANDEM GAIT TEST: ICD-10-CM

## 2025-06-11 PROCEDURE — 72141 MRI NECK SPINE W/O DYE: CPT

## 2025-06-11 PROCEDURE — 72146 MRI CHEST SPINE W/O DYE: CPT

## 2025-06-17 RX ORDER — FUROSEMIDE 40 MG/1
40 TABLET ORAL DAILY
Qty: 90 TABLET | Refills: 3 | Status: SHIPPED | OUTPATIENT
Start: 2025-06-17

## 2025-07-21 RX ORDER — ATORVASTATIN CALCIUM 80 MG/1
80 TABLET, FILM COATED ORAL NIGHTLY
Qty: 90 TABLET | Refills: 3 | Status: SHIPPED | OUTPATIENT
Start: 2025-07-21

## 2025-07-22 RX ORDER — METOPROLOL SUCCINATE 50 MG/1
50 TABLET, EXTENDED RELEASE ORAL DAILY
Qty: 90 TABLET | Refills: 3 | Status: SHIPPED | OUTPATIENT
Start: 2025-07-22

## 2025-07-24 ENCOUNTER — HOSPITAL ENCOUNTER (OUTPATIENT)
Dept: PHYSICAL THERAPY | Age: 70
Setting detail: THERAPIES SERIES
Discharge: HOME | End: 2025-07-24
Payer: MEDICARE

## 2025-07-24 PROCEDURE — G0283 ELEC STIM OTHER THAN WOUND: HCPCS

## 2025-07-24 PROCEDURE — 97110 THERAPEUTIC EXERCISES: CPT

## 2025-07-24 PROCEDURE — 97162 PT EVAL MOD COMPLEX 30 MIN: CPT

## 2025-07-24 ASSESSMENT — PAIN SCALES - QUEBEC BACK PAIN DISABILITY SCALE
TOTAL SCORE: 67
MAKE YOUR BED: VERY DIFFICULT
TAKE FOOD OUT OF THE REFRIGERATOR: FAIRLY DIFFICULT
CARRY TWO BAGS OF GROCERIES: SOMEWHAT DIFFICULT
THROW A BALL: UNABLE TO DO
SIT IN A CHAIR FOR SEVERAL HOURS: FAIRLY DIFFICULT
STAND UP FOR 20 TO 30 MINUTES: VERY DIFFICULT
MOVE A CHAIR: VERY DIFFICULT
BEND OVER TO CLEAN THE BATHTUB: UNABLE TO DO
PUT ON SOCKS OR PANYHOSE: FAIRLY DIFFICULT
CLIMB ONE FLIGHT OF STAIRS: SOMEWHAT DIFFICULT
LIFT AND CARRY A HEAVY SUITCASE: UNABLE TO DO
TURN OVER IN BED: FAIRLY DIFFICULT
PULL OR PUSH HEAVY DOORS: VERY DIFFICULT
RIDE IN A CAR: SOMEWHAT DIFFICULT
WALK A FEW BLOCKS OR 300 TO 400M: FAIRLY DIFFICULT
SLEEP THROUGH THE NIGHT: FAIRLY DIFFICULT
GET OUT OF BED: SOMEWHAT DIFFICULT
RUN ONE BLOCK OR 100M: UNABLE TO DO
WALK SEVERAL KILOMETERS  OR MILES: VERY DIFFICULT
REACH UP TO HIGH SHELVES: MINIMALLY DIFFICULT

## 2025-07-24 NOTE — PROGRESS NOTES
Phone: [phone]                       [facility]          Fax: [phone]                      Outpatient Physical Therapy                                                                      Evaluation  Date: 2025  Patient: Annabel Mcknight  : 1955  CSN #: [id number]    Referring Physician: Moses Tomas PA-C    Medical Diagnosis: Neck pain, M54.2; cervical stenosis, M48.02; thoracic pain, M54.6; LBP, M54.50; abnormal reflexes, R29.2    Treatment Diagnosis: Neck pain, thoracic and lower back pain  Onset Date: 25  PT Insurance Information: [employer] Medicare  Total # of Visits Approved: 12   Total # of Visits to Date: 1  No Show: 0  Canceled Appointment: 0    [x] This writer acknowledges review of patient history form     Subjective  Subjective: Patient reports 7/10 pain on average in neck and lower back. MRI of neck showed C4-5 canal stenosis and L4-5 foraminal stenosis. Patient reports jabbing pain in lower thoracic area with any pressure sitting against chair or first laying down. Pt sleeps laying on her side. Thoracic MRI showed possible cyst at T12 but doctor wants to do a repeat MRI in September to be sure.  Additional Pertinent Hx: hx of 3 stents 3031-4070, HTN, ear ringing, decreased balance    Observations:   General Observations  Description: Pt sits with fwd head and shoulders, hinges at C5-7; TTP C3-7 spinous processes, normal tone B cervical paraspinals; inconclusive positive VBI bilaterally with ear ringing and blurry vision. Equal B  strength. (+) TTP T10-12 spinous processes and L 3-5 spinous processes, increased tone but no pain B lumbar parapinals. (-) B slump test. Pt stands with lumbar hyperlordosis and anterior pelvic tilt. Relief with manual lumbar traction with therapy ball.    Objective    Strength       Strength LUE  L Shoulder Flexion: 4/5  L Shoulder ABduction: 4/5  L Shoulder Internal Rotation: 4/5  L Shoulder External Rotation: 4/5  L Elbow

## 2025-07-24 NOTE — PLAN OF CARE
[facility]           Phone: [phone]             Outpatient Physical Therapy  Fax: [phone]                                           Date: 2025  Patient: Annabel Mcknight : 1955 [facility] #: [id number]   Referring Physician: Moses Tomas PA-C      [x] Plan of Care   [] Updated Plan of Care    Dates of Service to Include: 2025 to 25    Diagnosis:  Neck pain, M54.2; cervical stenosis, M48.02; thoracic pain, M54.6; LBP, M54.50; abnormal reflexes, R29.2     Rehab (Treatment) Diagnosis:  Neck pain, thoracic and lower back pain         Onset Date:  25    Attendance  Total # of Visits to Date: 1 No Show: 0 Canceled Appointment: 0    Assessment  Assessment: Patient is 69 year old female with dx of neck, thoracic and low back pain, 7/10 on average.Pt sits with fwd head and shoulders, hinges at C5-7; TTP C3-7 spinous processes, normal tone B cervical paraspinals; inconclusive positive VBI bilaterally with ear ringing and blurry vision. Equal B  strength. (+) TTP T10-12 spinous processes and L 3-5 spinous processes, increased tone but no pain B lumbar parapinals. (-) B slump test. Pt stands with lumbar hyperlordosis and anterior pelvic tilt. Relief with manual lumbar traction with therapy ball. Decreased core and scapular strength: 4-/5 grossly. Decreased L LE strength: 4-/5 compared to R LE 4/5 grossly. Decreased CROM ext: 30*, B SB: 30* with pain. Decreased lumbar ROM flexion: mid shin with positive gowers sign, and B SB: to knees with pain. Patient to benefit from physical therapy to decrease pain and improve spinal stability and CROM/posture to return to PLOF.      Goals  Short Term Goals  Time Frame for Short Term Goals: 3 weeks  Short Term Goal 1: Patient to initiate HEP for improved spinal stability.  Short Term Goal 2: Patient to be instructed in core and scapular strengthening to

## 2025-07-28 ENCOUNTER — HOSPITAL ENCOUNTER (OUTPATIENT)
Dept: PHYSICAL THERAPY | Age: 70
Setting detail: THERAPIES SERIES
Discharge: HOME | End: 2025-07-28
Payer: MEDICARE

## 2025-07-28 PROCEDURE — G0283 ELEC STIM OTHER THAN WOUND: HCPCS

## 2025-07-28 PROCEDURE — 97140 MANUAL THERAPY 1/> REGIONS: CPT

## 2025-07-28 PROCEDURE — 97110 THERAPEUTIC EXERCISES: CPT

## 2025-07-28 NOTE — PROGRESS NOTES
Phone: [phone]                 [facility]           Fax: [phone]                           Outpatient Physical Therapy                                                                            Daily Note    Patient: Annabel Mcknight : 1955  [facility] #: [id number]   Referring Physician: Moses Tomas PA-C  Date: 2025    Treatment Diagnosis: Neck pain, thoracic and lower back pain    Onset Date: 25  PT Insurance Information: [employer]  Total # of Visits Approved: 12 Per Physician Order  Total # of Visits to Date: 2  No Show: 0  Canceled Appointment: 0    25 Plan of Care/Recert Due    Pre-Treatment Pain:  5/10  Subjective: Pt rates 5/10 pain this date, states the back tends to have more pain then the neck, depends on the movements she does.    Exercises:  Exercise 1: HEP: supine PPT's, marching with PPT, chin tucks and supine Hab's with orange band  Exercise 2: UBE x5 minutes  Exercise 3: Seated ball rollouts 10x3\" hold each  Exercise 4: Shoulder extension, rows x10 ea  Exercise 5: Standing 3-way hip x10 each  Exercise 6: Seated HABD's, diagonal's x10 ea --GTB  Exercise 7: Supine: LTR, marches x10 each  Exercise 8: Supine: HABD's, CROM x10 ea, Chin tucks x10    Manual:  Manual Traction: Gentle manual lumbar traction with small gray therapy ball with relief of pain noted; manual cervical traction targeting lower level, tolerated well, no change in pain--cervical traction only this date    Modality:     Modality Flow Sheet:   Performed (X) Tx Modality   X Electrical Stim: x10 minutes to mid-lower back for pain and discomfort      Ultrasound: ___ W/cm2 x ___ mins  Duty factor: __100%  __50%  __20% __10%  Head size: 10 mm      ______ Other:   MHz: __1mHz __2 mHz  __3mHz  Location:                                          Hot Pack:   X Cold Pack: x10 minutes with IFC     Vasopneumatic Compression with Ice to alleviate pain and/or edema of     Cervical Traction:  Pull:

## 2025-07-31 ENCOUNTER — HOSPITAL ENCOUNTER (OUTPATIENT)
Dept: PHYSICAL THERAPY | Age: 70
Setting detail: THERAPIES SERIES
Discharge: HOME | End: 2025-07-31
Payer: MEDICARE

## 2025-07-31 PROCEDURE — G0283 ELEC STIM OTHER THAN WOUND: HCPCS

## 2025-07-31 PROCEDURE — 97110 THERAPEUTIC EXERCISES: CPT

## 2025-07-31 NOTE — PROGRESS NOTES
Physical Therapy  Phone: [phone]                 [facility]           Fax: [phone]                           Outpatient Physical Therapy                                                                            Daily Note    Patient: Annabel Mcknight : 1955  [facility] #: [id number]   Referring Physician: Moses Tomas PA-C  Date: 2025     Treatment Diagnosis: Neck pain, thoracic and lower back pain    Onset Date: 25  PT Insurance Information: [employer] Medicare  Total # of Visits Approved: 12 Per Physician Order  Total # of Visits to Date: 3  No Show: 0  Canceled Appointment: 0    25 Plan of Care/Recert Due    Pre-Treatment Pain:  4/10  Subjective: Pt reports 4/10 neck pain, LBP 3/10 today. Reprots excercises are helping and noticing improvement since IE, felt good after last session.    Exercises:  Exercise 1: HEP: supine PPT's, marching with PPT, chin tucks and supine Hab's with orange band  Exercise 2: UBE x5 minutes  Exercise 3: Seated ball rollouts 10x3\" hold each // thoracic ext x 10  Exercise 4: Shoulder extension, rows x10 ea YTB  Exercise 6: Seated HABD's, diagonal's x10 ea --GTB - held diagonols  Exercise 7: Supine: LTR, marches x10 each, bridges x 10  Exercise 8: Supine: HABD's, CROM x10 ea, Chin tucks x10  Exercise 9: sdeated shoulder rolls/ scap squeezes/ shrugs with chin tuck x 10    Modality:     Modality Flow Sheet:   Performed (X) Tx Modality   x Electrical Stim: IFC + CP to neck       Ultrasound: ___ W/cm2 x ___ mins  Duty factor: __100%  __50%  __20% __10%  Head size: 10 mm      ______ Other:   MHz: __1mHz __2 mHz  __3mHz  Location:                                         x Hot Pack: IFC + MHP    Cold Pack: IFC + CP     Vasopneumatic Compression with Ice to alleviate pain and/or edema of     Cervical Traction:  Pull:  Weight ____  Rest ____    Hold Time: _____ sec   Rest: ____  Total Tx Time: ____min    Lumbar Traction:  Pull:  Weight ____  Rest ____

## 2025-08-01 ENCOUNTER — HOSPITAL ENCOUNTER (OUTPATIENT)
Dept: LAB | Age: 70
Discharge: HOME | End: 2025-08-01
Payer: MEDICARE

## 2025-08-01 DIAGNOSIS — Z13.21 ENCOUNTER FOR VITAMIN DEFICIENCY SCREENING: ICD-10-CM

## 2025-08-01 DIAGNOSIS — I10 ESSENTIAL HYPERTENSION: ICD-10-CM

## 2025-08-01 DIAGNOSIS — R73.9 HYPERGLYCEMIA: ICD-10-CM

## 2025-08-01 DIAGNOSIS — E78.5 HYPERLIPIDEMIA, UNSPECIFIED HYPERLIPIDEMIA TYPE: ICD-10-CM

## 2025-08-01 LAB
ALT SERPL-CCNC: 33 U/L (ref 10–35)
ANION GAP SERPL CALCULATED.3IONS-SCNC: 11 MMOL/L (ref 9–16)
AST SERPL-CCNC: 28 U/L (ref 10–35)
BUN SERPL-MCNC: 19 MG/DL (ref 8–23)
BUN/CREAT SERPL: 24 (ref 9–20)
CALCIUM SERPL-MCNC: 9.5 MG/DL (ref 8.6–10.4)
CHLORIDE SERPL-SCNC: 103 MMOL/L (ref 98–107)
CHOLEST SERPL-MCNC: 104 MG/DL (ref 0–199)
CHOLESTEROL/HDL RATIO: 2.3
CO2 SERPL-SCNC: 28 MMOL/L (ref 20–31)
CREAT SERPL-MCNC: 0.8 MG/DL (ref 0.5–0.9)
ERYTHROCYTE [DISTWIDTH] IN BLOOD BY AUTOMATED COUNT: 12.7 % (ref 11.8–14.4)
EST. AVERAGE GLUCOSE BLD GHB EST-MCNC: 123 MG/DL
GFR, ESTIMATED: 82 ML/MIN/1.73M2
GLUCOSE SERPL-MCNC: 106 MG/DL (ref 74–99)
HBA1C MFR BLD: 5.9 % (ref 4–6)
HCT VFR BLD AUTO: 42.8 % (ref 36.3–47.1)
HDLC SERPL-MCNC: 46 MG/DL
HGB BLD-MCNC: 13.8 G/DL (ref 11.9–15.1)
LDLC SERPL CALC-MCNC: 42 MG/DL (ref 0–100)
MCH RBC QN AUTO: 29 PG (ref 25.2–33.5)
MCHC RBC AUTO-ENTMCNC: 32.2 G/DL (ref 28.4–34.8)
MCV RBC AUTO: 89.9 FL (ref 82.6–102.9)
NRBC BLD-RTO: 0 PER 100 WBC
PLATELET # BLD AUTO: 201 K/UL (ref 138–453)
PMV BLD AUTO: 9.9 FL (ref 8.1–13.5)
POTASSIUM SERPL-SCNC: 4.3 MMOL/L (ref 3.7–5.3)
RBC # BLD AUTO: 4.76 M/UL (ref 3.95–5.11)
SODIUM SERPL-SCNC: 142 MMOL/L (ref 136–145)
TRIGL SERPL-MCNC: 81 MG/DL
VLDLC SERPL CALC-MCNC: 16 MG/DL (ref 1–30)
WBC OTHER # BLD: 6.2 K/UL (ref 3.5–11.3)

## 2025-08-01 PROCEDURE — 36415 COLL VENOUS BLD VENIPUNCTURE: CPT

## 2025-08-01 PROCEDURE — 80048 BASIC METABOLIC PNL TOTAL CA: CPT

## 2025-08-01 PROCEDURE — 85027 COMPLETE CBC AUTOMATED: CPT

## 2025-08-01 PROCEDURE — 80061 LIPID PANEL: CPT

## 2025-08-01 PROCEDURE — 84460 ALANINE AMINO (ALT) (SGPT): CPT

## 2025-08-01 PROCEDURE — 83036 HEMOGLOBIN GLYCOSYLATED A1C: CPT

## 2025-08-01 PROCEDURE — 84450 TRANSFERASE (AST) (SGOT): CPT

## 2025-08-02 SDOH — HEALTH STABILITY: PHYSICAL HEALTH: ON AVERAGE, HOW MANY MINUTES DO YOU ENGAGE IN EXERCISE AT THIS LEVEL?: 30 MIN

## 2025-08-02 SDOH — HEALTH STABILITY: PHYSICAL HEALTH: ON AVERAGE, HOW MANY DAYS PER WEEK DO YOU ENGAGE IN MODERATE TO STRENUOUS EXERCISE (LIKE A BRISK WALK)?: 6 DAYS

## 2025-08-02 ASSESSMENT — LIFESTYLE VARIABLES
HOW OFTEN DO YOU HAVE SIX OR MORE DRINKS ON ONE OCCASION: 1
HOW MANY STANDARD DRINKS CONTAINING ALCOHOL DO YOU HAVE ON A TYPICAL DAY: PATIENT DECLINED
HOW OFTEN DO YOU HAVE A DRINK CONTAINING ALCOHOL: NEVER
HOW OFTEN DO YOU HAVE A DRINK CONTAINING ALCOHOL: 1
HOW MANY STANDARD DRINKS CONTAINING ALCOHOL DO YOU HAVE ON A TYPICAL DAY: 98

## 2025-08-02 ASSESSMENT — PATIENT HEALTH QUESTIONNAIRE - PHQ9
2. FEELING DOWN, DEPRESSED OR HOPELESS: SEVERAL DAYS
SUM OF ALL RESPONSES TO PHQ QUESTIONS 1-9: 1
1. LITTLE INTEREST OR PLEASURE IN DOING THINGS: NOT AT ALL

## 2025-08-05 ENCOUNTER — OFFICE VISIT (OUTPATIENT)
Dept: PRIMARY CARE CLINIC | Age: 70
End: 2025-08-05
Payer: MEDICARE

## 2025-08-05 VITALS
WEIGHT: 194 LBS | DIASTOLIC BLOOD PRESSURE: 62 MMHG | OXYGEN SATURATION: 98 % | TEMPERATURE: 96.6 F | HEART RATE: 66 BPM | SYSTOLIC BLOOD PRESSURE: 130 MMHG | BODY MASS INDEX: 33.3 KG/M2

## 2025-08-05 DIAGNOSIS — Z71.89 ACP (ADVANCE CARE PLANNING): ICD-10-CM

## 2025-08-05 DIAGNOSIS — Z23 NEED FOR VACCINATION: ICD-10-CM

## 2025-08-05 DIAGNOSIS — Z00.00 MEDICARE ANNUAL WELLNESS VISIT, SUBSEQUENT: Primary | ICD-10-CM

## 2025-08-05 PROCEDURE — G0009 ADMIN PNEUMOCOCCAL VACCINE: HCPCS | Performed by: NURSE PRACTITIONER

## 2025-08-05 PROCEDURE — 3075F SYST BP GE 130 - 139MM HG: CPT | Performed by: NURSE PRACTITIONER

## 2025-08-05 PROCEDURE — 90677 PCV20 VACCINE IM: CPT | Performed by: NURSE PRACTITIONER

## 2025-08-05 PROCEDURE — 3078F DIAST BP <80 MM HG: CPT | Performed by: NURSE PRACTITIONER

## 2025-08-05 PROCEDURE — 1123F ACP DISCUSS/DSCN MKR DOCD: CPT | Performed by: NURSE PRACTITIONER

## 2025-08-05 PROCEDURE — G0439 PPPS, SUBSEQ VISIT: HCPCS | Performed by: NURSE PRACTITIONER

## 2025-08-05 PROCEDURE — 1159F MED LIST DOCD IN RCRD: CPT | Performed by: NURSE PRACTITIONER

## 2025-08-06 ENCOUNTER — APPOINTMENT (OUTPATIENT)
Dept: PHYSICAL THERAPY | Age: 70
End: 2025-08-06
Payer: MEDICARE

## 2025-08-06 ENCOUNTER — HOSPITAL ENCOUNTER (OUTPATIENT)
Dept: PHYSICAL THERAPY | Age: 70
Setting detail: THERAPIES SERIES
Discharge: HOME | End: 2025-08-06
Payer: MEDICARE

## 2025-08-06 ENCOUNTER — CLINICAL DOCUMENTATION (OUTPATIENT)
Age: 70
End: 2025-08-06

## 2025-08-06 PROCEDURE — 97110 THERAPEUTIC EXERCISES: CPT

## 2025-08-06 PROCEDURE — G0283 ELEC STIM OTHER THAN WOUND: HCPCS

## 2025-08-08 ENCOUNTER — HOSPITAL ENCOUNTER (OUTPATIENT)
Dept: PHYSICAL THERAPY | Age: 70
Setting detail: THERAPIES SERIES
Discharge: HOME | End: 2025-08-08
Payer: MEDICARE

## 2025-08-08 PROCEDURE — G0283 ELEC STIM OTHER THAN WOUND: HCPCS

## 2025-08-08 PROCEDURE — 97110 THERAPEUTIC EXERCISES: CPT

## 2025-08-13 ENCOUNTER — HOSPITAL ENCOUNTER (OUTPATIENT)
Dept: PHYSICAL THERAPY | Age: 70
Setting detail: THERAPIES SERIES
Discharge: HOME | End: 2025-08-13
Payer: MEDICARE

## 2025-08-13 PROCEDURE — 97110 THERAPEUTIC EXERCISES: CPT

## 2025-08-13 PROCEDURE — G0283 ELEC STIM OTHER THAN WOUND: HCPCS

## 2025-08-15 ENCOUNTER — HOSPITAL ENCOUNTER (OUTPATIENT)
Dept: PHYSICAL THERAPY | Age: 70
Setting detail: THERAPIES SERIES
Discharge: HOME | End: 2025-08-15
Payer: MEDICARE

## 2025-08-15 PROCEDURE — G0283 ELEC STIM OTHER THAN WOUND: HCPCS

## 2025-08-15 PROCEDURE — 97110 THERAPEUTIC EXERCISES: CPT

## 2025-08-18 ENCOUNTER — HOSPITAL ENCOUNTER (OUTPATIENT)
Dept: PHYSICAL THERAPY | Age: 70
Setting detail: THERAPIES SERIES
Discharge: HOME | End: 2025-08-18
Payer: MEDICARE

## 2025-08-18 PROCEDURE — 97110 THERAPEUTIC EXERCISES: CPT

## 2025-08-21 ENCOUNTER — HOSPITAL ENCOUNTER (OUTPATIENT)
Dept: PHYSICAL THERAPY | Age: 70
Setting detail: THERAPIES SERIES
Discharge: HOME | End: 2025-08-21
Payer: MEDICARE

## 2025-08-21 PROCEDURE — 97110 THERAPEUTIC EXERCISES: CPT

## 2025-08-21 PROCEDURE — G0283 ELEC STIM OTHER THAN WOUND: HCPCS

## 2025-08-22 ENCOUNTER — CLINICAL DOCUMENTATION (OUTPATIENT)
Age: 70
End: 2025-08-22

## 2025-08-25 ENCOUNTER — HOSPITAL ENCOUNTER (OUTPATIENT)
Dept: PHYSICAL THERAPY | Age: 70
Setting detail: THERAPIES SERIES
Discharge: HOME | End: 2025-08-25
Payer: MEDICARE

## 2025-08-26 ENCOUNTER — OFFICE VISIT (OUTPATIENT)
Dept: PRIMARY CARE CLINIC | Age: 70
End: 2025-08-26
Payer: MEDICARE

## 2025-08-26 ENCOUNTER — HOSPITAL ENCOUNTER (OUTPATIENT)
Dept: CT IMAGING | Age: 70
Discharge: HOME | End: 2025-08-28
Payer: MEDICARE

## 2025-08-26 ENCOUNTER — RESULTS FOLLOW-UP (OUTPATIENT)
Dept: PRIMARY CARE CLINIC | Age: 70
End: 2025-08-26

## 2025-08-26 VITALS
HEART RATE: 73 BPM | DIASTOLIC BLOOD PRESSURE: 60 MMHG | SYSTOLIC BLOOD PRESSURE: 120 MMHG | TEMPERATURE: 97.3 F | OXYGEN SATURATION: 99 % | WEIGHT: 194 LBS | BODY MASS INDEX: 33.3 KG/M2

## 2025-08-26 DIAGNOSIS — R51.9 ACUTE INTRACTABLE HEADACHE, UNSPECIFIED HEADACHE TYPE: ICD-10-CM

## 2025-08-26 DIAGNOSIS — H53.9 VISION CHANGES: ICD-10-CM

## 2025-08-26 DIAGNOSIS — J06.9 VIRAL URI: Primary | ICD-10-CM

## 2025-08-26 DIAGNOSIS — R05.1 ACUTE COUGH: ICD-10-CM

## 2025-08-26 LAB
INFLUENZA A ANTIGEN, POC: NEGATIVE
INFLUENZA B ANTIGEN, POC: NEGATIVE
LOT EXPIRE DATE: NORMAL
LOT KIT NUMBER: NORMAL
SARS-COV-2 RNA, POC: NEGATIVE
VALID INTERNAL CONTROL: NORMAL
VENDOR AND KIT NAME POC: NORMAL

## 2025-08-26 PROCEDURE — 3078F DIAST BP <80 MM HG: CPT | Performed by: NURSE PRACTITIONER

## 2025-08-26 PROCEDURE — 3074F SYST BP LT 130 MM HG: CPT | Performed by: NURSE PRACTITIONER

## 2025-08-26 PROCEDURE — 70450 CT HEAD/BRAIN W/O DYE: CPT

## 2025-08-26 PROCEDURE — G2211 COMPLEX E/M VISIT ADD ON: HCPCS | Performed by: NURSE PRACTITIONER

## 2025-08-26 PROCEDURE — 87428 SARSCOV & INF VIR A&B AG IA: CPT | Performed by: NURSE PRACTITIONER

## 2025-08-26 PROCEDURE — 1123F ACP DISCUSS/DSCN MKR DOCD: CPT | Performed by: NURSE PRACTITIONER

## 2025-08-26 PROCEDURE — 1159F MED LIST DOCD IN RCRD: CPT | Performed by: NURSE PRACTITIONER

## 2025-08-26 PROCEDURE — 99214 OFFICE O/P EST MOD 30 MIN: CPT | Performed by: NURSE PRACTITIONER

## 2025-08-26 RX ORDER — ALBUTEROL SULFATE 90 UG/1
2 INHALANT RESPIRATORY (INHALATION) EVERY 4 HOURS PRN
Qty: 18 G | Refills: 1 | Status: SHIPPED | OUTPATIENT
Start: 2025-08-26

## 2025-08-27 ENCOUNTER — HOSPITAL ENCOUNTER (OUTPATIENT)
Dept: PHYSICAL THERAPY | Age: 70
Setting detail: THERAPIES SERIES
Discharge: HOME | End: 2025-08-27
Payer: MEDICARE

## (undated) DEVICE — SURGICAL PROCEDURE TRAY CRD CATH SVMMC

## (undated) DEVICE — IMPLANTABLE DEVICE: Type: IMPLANTABLE DEVICE | Status: NON-FUNCTIONAL

## (undated) DEVICE — BAND COMPR L24CM REG CLR PLAS HEMSTAT EXT HK AND LOOP RETEN

## (undated) DEVICE — SOLUTION IV IRRIG WATER 1000ML POUR BRL 2F7114

## (undated) DEVICE — CANNULA ORAL NSL AD CO2 N INTUB O2 DEL DISP TRU LNK

## (undated) DEVICE — CATH BLLN ANGIO 2.50X8MM NC EUPHORIA RX

## (undated) DEVICE — CATH BLLN ANGIO 2.50X15MM SC EUPHORA RX

## (undated) DEVICE — CATHETER GUID 6FR L100CM DIA0.071IN NYL SHFT EBU3.5

## (undated) DEVICE — MEDI-VAC NON-CONDUCTIVE TUBING7MM X 30.5 (100FT): Brand: CARDINAL HEALTH

## (undated) DEVICE — STRAP ARMBRD W1.5XL32IN FOAM STR YET SFT W/ HK AND LOOP

## (undated) DEVICE — MERCY TIFFIN CATH LAB PACK: Brand: MEDLINE INDUSTRIES, INC.

## (undated) DEVICE — CATHETER INTVASC ULTRASOUND PLAT EAGLE EYE PHARM DIGITAL

## (undated) DEVICE — NEEDLE 25GAX5.0MM INJ CARR LOCKE

## (undated) DEVICE — GLIDESHEATH NITINOL HYDROPHILIC COATED INTRODUCER SHEATH: Brand: GLIDESHEATH

## (undated) DEVICE — SOLUTION IV IRRIG POUR BRL 0.9% SODIUM CHL 2F7124

## (undated) DEVICE — ANGIO-SEAL VIP VASCULAR CLOSURE DEVICE: Brand: ANGIO-SEAL

## (undated) DEVICE — ANGIOGRAPHIC CATHETER: Brand: EXPO™

## (undated) DEVICE — GLOVE ORANGE PI 7 1/2   MSG9075

## (undated) DEVICE — TRAY SURG CUST CRD CATH TOLEDO

## (undated) DEVICE — SNARE EXACTO COLD

## (undated) DEVICE — GUIDE 6FR XB3.5 CORDIS 100CM

## (undated) DEVICE — CATH BLLN ANGIO 2.25X20MM SC EUPHORA RX

## (undated) DEVICE — Device: Brand: PROWATER

## (undated) DEVICE — GOWN,AURORA,NON-REINFORCED,2XL: Brand: MEDLINE

## (undated) DEVICE — TRAP SURG QUAD PARABOLA SLOT DSGN SFTY SCRN TRAPEASE

## (undated) DEVICE — CATH BLLN ANGIO 2.50X12MM NC EUPHORIA RX

## (undated) DEVICE — DRAPE, RADIAL, STERILE: Brand: MEDLINE

## (undated) DEVICE — GLIDESHEATH SLENDER STAINLESS STEEL KIT: Brand: GLIDESHEATH SLENDER

## (undated) DEVICE — GUIDEWIRE WITH ICE™ HYDROPHILIC COATING: Brand: LUGE™

## (undated) DEVICE — CATHETER ANGIO 6FR L110CM ID0.056IN VENT PIG CRV ROBUST

## (undated) DEVICE — RADIFOCUS OPTITORQUE ANGIOGRAPHIC CATHETER: Brand: OPTITORQUE

## (undated) DEVICE — RUNTHROUGH NS EXTRA FLOPPY PTCA GUIDEWIRE: Brand: RUNTHROUGH

## (undated) DEVICE — BENTSON WIRE GUIDE 20CM DISTAL FLEXIBILITY WITH SOFTENED TIP: Brand: BENTSON

## (undated) DEVICE — CATH BLLN ANGIO 2X10MM SC EUPHORA RX

## (undated) DEVICE — PACLITAXEL-COATED BALLOON CATHETER: Brand: AGENT™

## (undated) DEVICE — HI-TORQUE BALANCE MIDDLEWEIGHT UNIVERSAL II GUIDE WIRE J TIP PAK 190 CM: Brand: HI-TORQUE BALANCE MIDDLEWEIGHT UNIVERSAL II